# Patient Record
Sex: MALE | Race: WHITE | NOT HISPANIC OR LATINO | Employment: FULL TIME | URBAN - METROPOLITAN AREA
[De-identification: names, ages, dates, MRNs, and addresses within clinical notes are randomized per-mention and may not be internally consistent; named-entity substitution may affect disease eponyms.]

---

## 2022-06-02 DIAGNOSIS — M75.112 INCOMPLETE ROTATOR CUFF TEAR OR RUPTURE OF LEFT SHOULDER, NOT SPECIFIED AS TRAUMATIC: Primary | ICD-10-CM

## 2022-06-10 ENCOUNTER — HOSPITAL ENCOUNTER (OUTPATIENT)
Dept: RADIOLOGY | Facility: HOSPITAL | Age: 67
Discharge: HOME | End: 2022-06-10
Attending: ORTHOPAEDIC SURGERY
Payer: COMMERCIAL

## 2022-06-10 DIAGNOSIS — M75.112 INCOMPLETE ROTATOR CUFF TEAR OR RUPTURE OF LEFT SHOULDER, NOT SPECIFIED AS TRAUMATIC: ICD-10-CM

## 2022-06-24 ENCOUNTER — HOSPITAL ENCOUNTER (OUTPATIENT)
Dept: RADIOLOGY | Facility: HOSPITAL | Age: 67
Discharge: HOME | End: 2022-06-24
Attending: ORTHOPAEDIC SURGERY
Payer: MEDICARE

## 2022-06-24 PROCEDURE — 73221 MRI JOINT UPR EXTREM W/O DYE: CPT | Mod: TC,LT

## 2022-06-24 PROCEDURE — 73221 MRI JOINT UPR EXTREM W/O DYE: CPT | Mod: 26,LT,, | Performed by: RADIOLOGY

## 2022-06-24 PROCEDURE — 73221 MRI SHOULDER WITHOUT CONTRAST LEFT: ICD-10-PCS | Mod: 26,LT,, | Performed by: RADIOLOGY

## 2022-08-02 ENCOUNTER — PROCEDURE VISIT (OUTPATIENT)
Dept: NEUROLOGY | Facility: CLINIC | Age: 67
End: 2022-08-02
Payer: MEDICARE

## 2022-08-02 DIAGNOSIS — G56.03 BILATERAL CARPAL TUNNEL SYNDROME: Primary | ICD-10-CM

## 2022-08-02 PROCEDURE — 99999 PR STA SHADOW: CPT | Mod: 26,PBBFAC,, | Performed by: PSYCHIATRY & NEUROLOGY

## 2022-08-02 PROCEDURE — 95886 MUSC TEST DONE W/N TEST COMP: CPT | Mod: 26,S$PBB | Performed by: PSYCHIATRY & NEUROLOGY

## 2022-08-02 PROCEDURE — 99999 PR STA SHADOW: ICD-10-PCS | Mod: PBBFAC,,, | Performed by: PSYCHIATRY & NEUROLOGY

## 2022-08-02 PROCEDURE — 95911 NRV CNDJ TEST 9-10 STUDIES: CPT | Mod: 26,S$PBB | Performed by: PSYCHIATRY & NEUROLOGY

## 2022-08-02 NOTE — PROCEDURES
EMG W/ ULTRASOUND AND NERVE CONDUCTION TEST 2 Extremities    Date/Time: 8/2/2022 4:00 PM  Performed by: Irvin Ward MD  Authorized by: Irvin Ward MD       REPORT OF EMG and NERVE CONDUCTION STUDY    Name:Sal Rowley  Date of Study:  8/2/2022  Referring Physician:  Dr. Esquivel  Test Performed by:  MD Sylvia  Full Values Attached  Informed Consent Scanned.   No anesthesia used.   Amount of Blood Loss: none. The patient tolerated this procedure well.       Informed consent was obtained prior to performing this study. Two patient identifiers were confirmed with the patient prior to performing this study. A time out to determine correct patient and and agreement on procedure performed was conducted prior to the concentric needle examination.    Reason for the study:  Numb hands.       Findings:   Nerve conduction studies of the bilateral median (motor and sensory)nerves, bilateral ulnar (motor and sensory) nerves, bilateral radial sensory nerves were performed.  Median motor distal latency was prolonged to 6.8ms on the right and 4.7ms on the left. Median palm to wrist latency was prolonged to 5.4ms on the right and 3.4ms on the left. Otherwise, amplitudes, distal latencies, conduction velocities, and F-waves were normal.   EMG of selected muscles of the bilateral arms were performed as indicated on the attached sheets. Insertional activity was normal without fasciculation or fibrillation, normal sized and phasia of motor units.      Impression:  Abnormal Study secondary to the Presence of:    Bilateral Carpal Tunnel Syndrome which is moderately severe on the right and mild on the left    Irvin Ward M.D. [facility].     A copy of this EMG/NCS report will be sent to Dr Esquivel . Thanks for sending this patient for EMG/NCS. The patient plans to await further recommendations from you regarding the above findings.

## 2022-12-22 DIAGNOSIS — R05.3 CHRONIC COUGH: Primary | ICD-10-CM

## 2023-01-09 ENCOUNTER — HOSPITAL ENCOUNTER (OUTPATIENT)
Dept: PULMONOLOGY | Facility: HOSPITAL | Age: 68
Discharge: HOME | End: 2023-01-09
Attending: NURSE PRACTITIONER
Payer: COMMERCIAL

## 2023-01-09 DIAGNOSIS — R05.3 CHRONIC COUGH: ICD-10-CM

## 2023-01-09 PROCEDURE — 94729 DIFFUSING CAPACITY: CPT | Mod: 26,,, | Performed by: INTERNAL MEDICINE

## 2023-01-09 PROCEDURE — 94727 GAS DIL/WSHOT DETER LNG VOL: CPT

## 2023-01-09 PROCEDURE — 94799 UNLISTED PULMONARY SVC/PX: CPT | Mod: 26,,, | Performed by: INTERNAL MEDICINE

## 2023-01-09 PROCEDURE — 99900035 HC TECH TIME PER 15 MIN (STAT)

## 2023-01-09 PROCEDURE — 94729 PR C02/MEMBANE DIFFUSE CAPACITY: ICD-10-PCS | Mod: 26,,, | Performed by: INTERNAL MEDICINE

## 2023-01-09 PROCEDURE — 94060 EVALUATION OF WHEEZING: CPT

## 2023-01-09 PROCEDURE — 94729 DIFFUSING CAPACITY: CPT

## 2023-01-09 PROCEDURE — 99900031 HC PATIENT EDUCATION (STAT)

## 2023-01-09 PROCEDURE — 94060 PR EVAL OF BRONCHOSPASM: ICD-10-PCS | Mod: 26,,, | Performed by: INTERNAL MEDICINE

## 2023-01-09 PROCEDURE — 94727 GAS DIL/WSHOT DETER LNG VOL: CPT | Mod: 26,,, | Performed by: INTERNAL MEDICINE

## 2023-01-09 PROCEDURE — 94799 PR NIF/PIF PULMONARY FUNCTION TEST: ICD-10-PCS | Mod: 26,,, | Performed by: INTERNAL MEDICINE

## 2023-01-09 PROCEDURE — 94060 EVALUATION OF WHEEZING: CPT | Mod: 26,,, | Performed by: INTERNAL MEDICINE

## 2023-01-09 PROCEDURE — 94727 PR PULM FUNCTION TEST BY GAS: ICD-10-PCS | Mod: 26,,, | Performed by: INTERNAL MEDICINE

## 2023-01-11 PROBLEM — R05.3 CHRONIC COUGH: Status: ACTIVE | Noted: 2023-01-11

## 2023-01-11 LAB
BRPFT: ABNORMAL
DLCO SINGLE BREATH LLN: 16.63
DLCO SINGLE BREATH PRE REF: 68.2 %
DLCO SINGLE BREATH REF: 23.56
DLCOC SBVA LLN: 2.51
DLCOC SBVA REF: 3.86
DLCOC SINGLE BREATH LLN: 16.63
DLCOC SINGLE BREATH REF: 23.56
DLCOVA LLN: 2.51
DLCOVA PRE REF: 108.7 %
DLCOVA PRE: 4.19 ML/(MIN*MMHG*L) (ref 2.51–5.2)
DLCOVA REF: 3.86
ERVN2 LLN: -16449.03
ERVN2 POST REF: 55.9 %
ERVN2 POST: 0.54 L (ref -16449.03–16450.97)
ERVN2 REF: 0.97
FEF 25 75 CHG: 5 %
FEF 25 75 LLN: 1.01
FEF 25 75 POST REF: 151.4 %
FEF 25 75 PRE REF: 144.1 %
FEF 25 75 REF: 2.26
FET100 CHG: -18.1 %
FEV1 CHG: -2.1 %
FEV1 FVC CHG: 2.3 %
FEV1 FVC LLN: 64
FEV1 FVC POST REF: 111.7 %
FEV1 FVC PRE REF: 109.1 %
FEV1 FVC REF: 77
FEV1 LLN: 2.05
FEV1 POST REF: 86 %
FEV1 PRE REF: 87.9 %
FEV1 REF: 2.8
FRCN2 LLN: 2.39
FRCN2 POST REF: 57.4 %
FRCN2 POST: 1.94 L (ref 2.39–4.36)
FRCN2 REF: 3.38
FVC CHG: -4.3 %
FVC LLN: 2.71
FVC POST REF: 77 %
FVC PRE REF: 80.5 %
FVC REF: 3.63
IVC PRE: 2.18 L (ref 2.71–4.56)
IVC SINGLE BREATH LLN: 2.71
IVC SINGLE BREATH PRE REF: 60.1 %
IVC SINGLE BREATH REF: 3.63
MEP LLN: 83
MEP PRE REF: 93 %
MEP PRE: 93.03 CMH2O (ref 83.23–116.78)
MEP REF: 100
MIP LLN: 58
MIP PRE REF: 129.1 %
MIP PRE: 96.83 CMH2O (ref 58.23–91.78)
MIP REF: 75
MVV LLN: 91
MVV PRE REF: 87 %
MVV REF: 107
PEF CHG: 22 %
PEF LLN: 5.56
PEF POST REF: 95 %
PEF PRE REF: 77.9 %
PEF REF: 7.56
POST FEF 25 75: 3.42 L/S (ref 1.01–4)
POST FET 100: 6 SEC
POST FEV1 FVC: 86.23 % (ref 64.03–88.87)
POST FEV1: 2.41 L (ref 2.05–3.51)
POST FVC: 2.79 L (ref 2.71–4.56)
POST PEF: 7.18 L/S (ref 5.56–9.56)
PRE DLCO: 16.08 ML/(MIN*MMHG) (ref 16.63–30.49)
PRE FEF 25 75: 3.26 L/S (ref 1.01–4)
PRE FET 100: 7.33 SEC
PRE FEV1 FVC: 84.27 % (ref 64.03–88.87)
PRE FEV1: 2.46 L (ref 2.05–3.51)
PRE FVC: 2.92 L (ref 2.71–4.56)
PRE MVV: 92.76 L/MIN (ref 90.65–122.65)
PRE PEF: 5.89 L/S (ref 5.56–9.56)
RVN2 LLN: 1.73
RVN2 POST REF: 58 %
RVN2 POST: 1.4 L (ref 1.73–3.08)
RVN2 REF: 2.41
RVN2TLCN2 LLN: 31.11
RVN2TLCN2 POST REF: 83.1 %
RVN2TLCN2 POST: 33.31 % (ref 31.11–49.07)
RVN2TLCN2 REF: 40.09
TLCN2 LLN: 4.96
TLCN2 POST REF: 68.6 %
TLCN2 POST: 4.19 L (ref 4.96–7.26)
TLCN2 REF: 6.11
VA PRE: 3.84 L (ref 5.96–5.96)
VA SINGLE BREATH LLN: 5.96
VA SINGLE BREATH PRE REF: 64.4 %
VA SINGLE BREATH REF: 5.96
VCMAXN2 LLN: 2.71
VCMAXN2 POST REF: 77 %
VCMAXN2 POST: 2.79 L (ref 2.71–4.56)
VCMAXN2 REF: 3.63

## 2023-03-27 PROBLEM — G56.02 LEFT CARPAL TUNNEL SYNDROME: Status: ACTIVE | Noted: 2023-03-27

## 2023-04-10 PROBLEM — G56.01 CARPAL TUNNEL SYNDROME ON RIGHT: Status: ACTIVE | Noted: 2023-04-10

## 2023-06-05 ENCOUNTER — APPOINTMENT (OUTPATIENT)
Dept: RADIOLOGY | Facility: CLINIC | Age: 68
End: 2023-06-05
Attending: FAMILY MEDICINE
Payer: COMMERCIAL

## 2023-06-05 ENCOUNTER — OFFICE VISIT (OUTPATIENT)
Dept: FAMILY MEDICINE | Facility: CLINIC | Age: 68
End: 2023-06-05
Payer: MEDICARE

## 2023-06-05 VITALS
BODY MASS INDEX: 36.72 KG/M2 | WEIGHT: 220.38 LBS | SYSTOLIC BLOOD PRESSURE: 136 MMHG | HEIGHT: 65 IN | RESPIRATION RATE: 16 BRPM | OXYGEN SATURATION: 98 % | HEART RATE: 82 BPM | DIASTOLIC BLOOD PRESSURE: 82 MMHG

## 2023-06-05 DIAGNOSIS — R20.0 NUMBNESS OF RIGHT ANTERIOR THIGH: ICD-10-CM

## 2023-06-05 DIAGNOSIS — R05.3 CHRONIC COUGH: ICD-10-CM

## 2023-06-05 DIAGNOSIS — I10 ESSENTIAL HYPERTENSION: Primary | ICD-10-CM

## 2023-06-05 DIAGNOSIS — Z12.11 COLON CANCER SCREENING: ICD-10-CM

## 2023-06-05 DIAGNOSIS — M43.16 SPONDYLOLISTHESIS OF LUMBAR REGION: ICD-10-CM

## 2023-06-05 DIAGNOSIS — M48.062 SPINAL STENOSIS OF LUMBAR REGION WITH NEUROGENIC CLAUDICATION: ICD-10-CM

## 2023-06-05 DIAGNOSIS — M67.912 DISORDER OF ROTATOR CUFF OF BOTH SHOULDERS: ICD-10-CM

## 2023-06-05 DIAGNOSIS — E78.5 DYSLIPIDEMIA: ICD-10-CM

## 2023-06-05 DIAGNOSIS — M67.911 DISORDER OF ROTATOR CUFF OF BOTH SHOULDERS: ICD-10-CM

## 2023-06-05 PROCEDURE — 4010F PR ACE/ARB THEARPY RXD/TAKEN: ICD-10-PCS | Mod: CPTII,S$GLB,, | Performed by: FAMILY MEDICINE

## 2023-06-05 PROCEDURE — 72100 X-RAY EXAM L-S SPINE 2/3 VWS: CPT | Mod: 26,,, | Performed by: RADIOLOGY

## 2023-06-05 PROCEDURE — 3008F PR BODY MASS INDEX (BMI) DOCUMENTED: ICD-10-PCS | Mod: CPTII,S$GLB,, | Performed by: FAMILY MEDICINE

## 2023-06-05 PROCEDURE — 1159F PR MEDICATION LIST DOCUMENTED IN MEDICAL RECORD: ICD-10-PCS | Mod: CPTII,S$GLB,, | Performed by: FAMILY MEDICINE

## 2023-06-05 PROCEDURE — 99204 OFFICE O/P NEW MOD 45 MIN: CPT | Mod: S$GLB,,, | Performed by: FAMILY MEDICINE

## 2023-06-05 PROCEDURE — 1126F AMNT PAIN NOTED NONE PRSNT: CPT | Mod: CPTII,S$GLB,, | Performed by: FAMILY MEDICINE

## 2023-06-05 PROCEDURE — 4010F ACE/ARB THERAPY RXD/TAKEN: CPT | Mod: CPTII,S$GLB,, | Performed by: FAMILY MEDICINE

## 2023-06-05 PROCEDURE — 3008F BODY MASS INDEX DOCD: CPT | Mod: CPTII,S$GLB,, | Performed by: FAMILY MEDICINE

## 2023-06-05 PROCEDURE — 1126F PR PAIN SEVERITY QUANTIFIED, NO PAIN PRESENT: ICD-10-PCS | Mod: CPTII,S$GLB,, | Performed by: FAMILY MEDICINE

## 2023-06-05 PROCEDURE — 93010 ELECTROCARDIOGRAM REPORT: CPT | Mod: S$GLB,,, | Performed by: INTERNAL MEDICINE

## 2023-06-05 PROCEDURE — 93010 EKG 12-LEAD: ICD-10-PCS | Mod: S$GLB,,, | Performed by: INTERNAL MEDICINE

## 2023-06-05 PROCEDURE — 99999 PR PBB SHADOW E&M-EST. PATIENT-LVL V: ICD-10-PCS | Mod: PBBFAC,,, | Performed by: FAMILY MEDICINE

## 2023-06-05 PROCEDURE — 1101F PT FALLS ASSESS-DOCD LE1/YR: CPT | Mod: CPTII,S$GLB,, | Performed by: FAMILY MEDICINE

## 2023-06-05 PROCEDURE — 1101F PR PT FALLS ASSESS DOC 0-1 FALLS W/OUT INJ PAST YR: ICD-10-PCS | Mod: CPTII,S$GLB,, | Performed by: FAMILY MEDICINE

## 2023-06-05 PROCEDURE — 99999 PR PBB SHADOW E&M-EST. PATIENT-LVL V: CPT | Mod: PBBFAC,,, | Performed by: FAMILY MEDICINE

## 2023-06-05 PROCEDURE — 93005 EKG 12-LEAD: ICD-10-PCS | Mod: S$GLB,,, | Performed by: FAMILY MEDICINE

## 2023-06-05 PROCEDURE — 3288F PR FALLS RISK ASSESSMENT DOCUMENTED: ICD-10-PCS | Mod: CPTII,S$GLB,, | Performed by: FAMILY MEDICINE

## 2023-06-05 PROCEDURE — 93005 ELECTROCARDIOGRAM TRACING: CPT | Mod: S$GLB,,, | Performed by: FAMILY MEDICINE

## 2023-06-05 PROCEDURE — 3075F SYST BP GE 130 - 139MM HG: CPT | Mod: CPTII,S$GLB,, | Performed by: FAMILY MEDICINE

## 2023-06-05 PROCEDURE — 3288F FALL RISK ASSESSMENT DOCD: CPT | Mod: CPTII,S$GLB,, | Performed by: FAMILY MEDICINE

## 2023-06-05 PROCEDURE — 3079F DIAST BP 80-89 MM HG: CPT | Mod: CPTII,S$GLB,, | Performed by: FAMILY MEDICINE

## 2023-06-05 PROCEDURE — 1159F MED LIST DOCD IN RCRD: CPT | Mod: CPTII,S$GLB,, | Performed by: FAMILY MEDICINE

## 2023-06-05 PROCEDURE — 3075F PR MOST RECENT SYSTOLIC BLOOD PRESS GE 130-139MM HG: ICD-10-PCS | Mod: CPTII,S$GLB,, | Performed by: FAMILY MEDICINE

## 2023-06-05 PROCEDURE — 3079F PR MOST RECENT DIASTOLIC BLOOD PRESSURE 80-89 MM HG: ICD-10-PCS | Mod: CPTII,S$GLB,, | Performed by: FAMILY MEDICINE

## 2023-06-05 PROCEDURE — 72100 XR LUMBAR SPINE AP AND LATERAL: ICD-10-PCS | Mod: 26,,, | Performed by: RADIOLOGY

## 2023-06-05 PROCEDURE — 72100 X-RAY EXAM L-S SPINE 2/3 VWS: CPT | Mod: TC,PO

## 2023-06-05 PROCEDURE — 99204 PR OFFICE/OUTPT VISIT, NEW, LEVL IV, 45-59 MIN: ICD-10-PCS | Mod: S$GLB,,, | Performed by: FAMILY MEDICINE

## 2023-06-05 RX ORDER — LOSARTAN POTASSIUM 50 MG/1
1 TABLET ORAL
COMMUNITY
Start: 2023-01-23 | End: 2023-06-05

## 2023-06-05 RX ORDER — CELECOXIB 200 MG/1
200 CAPSULE ORAL 2 TIMES DAILY PRN
COMMUNITY
Start: 2023-05-26 | End: 2023-09-29 | Stop reason: SDUPTHER

## 2023-06-05 RX ORDER — PANTOPRAZOLE SODIUM 40 MG/1
40 TABLET, DELAYED RELEASE ORAL DAILY
Qty: 30 TABLET | Refills: 5 | Status: SHIPPED | OUTPATIENT
Start: 2023-06-05 | End: 2023-07-05

## 2023-06-05 RX ORDER — LOSARTAN POTASSIUM 100 MG/1
100 TABLET ORAL DAILY
Qty: 30 TABLET | Refills: 5 | Status: SHIPPED | OUTPATIENT
Start: 2023-06-05 | End: 2023-09-29

## 2023-06-05 RX ORDER — FLUTICASONE FUROATE, UMECLIDINIUM BROMIDE AND VILANTEROL TRIFENATATE 100; 62.5; 25 UG/1; UG/1; UG/1
1 POWDER RESPIRATORY (INHALATION)
COMMUNITY
Start: 2023-05-27 | End: 2023-09-29

## 2023-06-05 RX ORDER — PANTOPRAZOLE SODIUM 40 MG/1
40 TABLET, DELAYED RELEASE ORAL DAILY
Qty: 30 TABLET | Refills: 5 | Status: SHIPPED | OUTPATIENT
Start: 2023-06-05 | End: 2024-03-28 | Stop reason: SDUPTHER

## 2023-06-05 NOTE — PROGRESS NOTES
Subjective:       Patient ID: Sal Rowley is a 68 y.o. male.    Chief Complaint: Establish Care (Pt states he has a history of shoulder and hand pain)    Pt is a 68 y.o. male who presents for evaluation and management of   Encounter Diagnoses   Name Primary?    Essential hypertension Yes    Dyslipidemia     Chronic cough     Disorder of rotator cuff of both shoulders     Numbness of right anterior thigh     Colon cancer screening     Spinal stenosis of lumbar region with neurogenic claudication     Spondylolisthesis of lumbar region    .  Doing well on current meds. Denies any side effects. Prevention is up to date.  Review of Systems   Constitutional:  Negative for fever.   Respiratory:  Negative for shortness of breath.    Cardiovascular:  Negative for chest pain.   Gastrointestinal:  Negative for anal bleeding and blood in stool.   Genitourinary:  Negative for dysuria.   Musculoskeletal:  Positive for arthralgias. Negative for back pain.   Neurological:  Positive for numbness. Negative for dizziness and light-headedness.        Numbness right thigh worse with standing        Objective:      Physical Exam  Constitutional:       Appearance: Normal appearance. He is well-developed. He is obese. He is not ill-appearing.   HENT:      Head: Normocephalic and atraumatic.      Right Ear: External ear normal.      Left Ear: External ear normal.      Nose: Nose normal.      Mouth/Throat:      Mouth: Mucous membranes are moist.      Pharynx: No oropharyngeal exudate or posterior oropharyngeal erythema.   Eyes:      General: No scleral icterus.        Right eye: No discharge.         Left eye: No discharge.      Conjunctiva/sclera: Conjunctivae normal.      Pupils: Pupils are equal, round, and reactive to light.   Neck:      Thyroid: No thyromegaly.      Vascular: No JVD.      Trachea: No tracheal deviation.   Cardiovascular:      Rate and Rhythm: Normal rate and regular rhythm.      Heart sounds: Normal heart  sounds. No murmur heard.  Pulmonary:      Effort: Pulmonary effort is normal. No respiratory distress.      Breath sounds: Normal breath sounds. No wheezing or rales.   Chest:      Chest wall: No tenderness.   Abdominal:      General: Bowel sounds are normal. There is no distension.      Palpations: Abdomen is soft. There is no mass.      Tenderness: There is no abdominal tenderness. There is no guarding or rebound.   Musculoskeletal:         General: Normal range of motion.      Cervical back: Normal range of motion and neck supple.      Right lower leg: No edema.      Left lower leg: No edema.   Lymphadenopathy:      Cervical: No cervical adenopathy.   Skin:     General: Skin is warm and dry.   Neurological:      Mental Status: He is alert and oriented to person, place, and time.      Cranial Nerves: No cranial nerve deficit.      Motor: No abnormal muscle tone.      Coordination: Coordination normal.      Deep Tendon Reflexes: Reflexes are normal and symmetric. Reflexes normal.   Psychiatric:         Behavior: Behavior normal.         Thought Content: Thought content normal.         Judgment: Judgment normal.       Assessment:       1. Essential hypertension    2. Dyslipidemia    3. Chronic cough    4. Disorder of rotator cuff of both shoulders    5. Numbness of right anterior thigh    6. Colon cancer screening    7. Spinal stenosis of lumbar region with neurogenic claudication    8. Spondylolisthesis of lumbar region        Plan:   1. Essential hypertension  -     losartan (COZAAR) 100 MG tablet; Take 1 tablet (100 mg total) by mouth once daily.  Dispense: 30 tablet; Refill: 5  -     EKG 12-lead    2. Dyslipidemia    3. Chronic cough  Overview:  Remote h/o smoking. Quit almost 30 years ago   His previous PCP put him on Trellegy which has helped some with the cough. He called him COPD, per pt.   His PFT's from 1/2023 show no obstructive pattern. He has moderate restriction.   Trial of PPI 6/2023. He has  pulmonology consult pending....     Orders:  -     X-Ray Chest PA And Lateral; Future; Expected date: 06/05/2023  -     pantoprazole (PROTONIX) 40 MG tablet; Take 1 tablet (40 mg total) by mouth once daily.  Dispense: 30 tablet; Refill: 5  -     pantoprazole (PROTONIX) 40 MG tablet; Take 1 tablet (40 mg total) by mouth once daily.  Dispense: 30 tablet; Refill: 5    4. Disorder of rotator cuff of both shoulders  Overview:  Right repaired [facility]. Waiting to have left done...     Orders:  -     Ambulatory referral/consult to Orthopedics; Future; Expected date: 06/12/2023    5. Numbness of right anterior thigh  -     X-Ray Lumbar Spine AP And Lateral; Future; Expected date: 06/05/2023    6. Colon cancer screening  -     Case Request Endoscopy: COLONOSCOPY    7. Spinal stenosis of lumbar region with neurogenic claudication    8. Spondylolisthesis of lumbar region  Overview:  Has some right thigh pain with standing, but no meds needed   He tolerates it for now         Will decide f/u after I get labs from his previous PCP   No follow-ups on file.

## 2023-06-07 DIAGNOSIS — Z12.11 COLON CANCER SCREENING: ICD-10-CM

## 2023-06-07 DIAGNOSIS — I10 ESSENTIAL HYPERTENSION: ICD-10-CM

## 2023-07-12 ENCOUNTER — HOSPITAL ENCOUNTER (OUTPATIENT)
Dept: PREADMISSION TESTING | Facility: HOSPITAL | Age: 68
Discharge: HOME | End: 2023-07-12
Attending: FAMILY MEDICINE
Payer: COMMERCIAL

## 2023-07-12 ENCOUNTER — HOSPITAL ENCOUNTER (OUTPATIENT)
Dept: PULMONOLOGY | Facility: HOSPITAL | Age: 68
Discharge: HOME | End: 2023-07-12
Attending: FAMILY MEDICINE
Payer: COMMERCIAL

## 2023-07-12 DIAGNOSIS — Z01.818 PREOP TESTING: ICD-10-CM

## 2023-07-12 PROCEDURE — 93010 EKG 12-LEAD: ICD-10-PCS | Mod: ,,, | Performed by: INTERNAL MEDICINE

## 2023-07-12 PROCEDURE — 93010 ELECTROCARDIOGRAM REPORT: CPT | Mod: ,,, | Performed by: INTERNAL MEDICINE

## 2023-07-12 PROCEDURE — 99900035 HC TECH TIME PER 15 MIN (STAT)

## 2023-07-12 PROCEDURE — 93005 ELECTROCARDIOGRAM TRACING: CPT

## 2023-07-25 ENCOUNTER — HOSPITAL ENCOUNTER (OUTPATIENT)
Facility: HOSPITAL | Age: 68
Discharge: HOME | End: 2023-07-25
Attending: FAMILY MEDICINE | Admitting: FAMILY MEDICINE
Payer: COMMERCIAL

## 2023-07-25 ENCOUNTER — ANESTHESIA (OUTPATIENT)
Dept: ENDOSCOPY | Facility: HOSPITAL | Age: 68
End: 2023-07-25
Payer: COMMERCIAL

## 2023-07-25 ENCOUNTER — ANESTHESIA EVENT (OUTPATIENT)
Dept: ENDOSCOPY | Facility: HOSPITAL | Age: 68
End: 2023-07-25
Payer: COMMERCIAL

## 2023-07-25 VITALS
OXYGEN SATURATION: 98 % | TEMPERATURE: 97 F | DIASTOLIC BLOOD PRESSURE: 74 MMHG | HEART RATE: 64 BPM | SYSTOLIC BLOOD PRESSURE: 124 MMHG | RESPIRATION RATE: 17 BRPM

## 2023-07-25 DIAGNOSIS — K63.5 POLYP OF DESCENDING COLON, UNSPECIFIED TYPE: Primary | ICD-10-CM

## 2023-07-25 DIAGNOSIS — Z12.11 SCREEN FOR COLON CANCER: ICD-10-CM

## 2023-07-25 PROCEDURE — 88305 TISSUE EXAM BY PATHOLOGIST: ICD-10-PCS | Mod: 26,,, | Performed by: PATHOLOGY

## 2023-07-25 PROCEDURE — 00811 ANES LWR INTST NDSC NOS: CPT | Mod: QZ,P2,33 | Performed by: NURSE ANESTHETIST, CERTIFIED REGISTERED

## 2023-07-25 PROCEDURE — D9220AH HC ANESTHESIA PROFESSIONAL FEE: Mod: QZ,P2,33 | Performed by: NURSE ANESTHETIST, CERTIFIED REGISTERED

## 2023-07-25 PROCEDURE — 63600175 PHARM REV CODE 636 W HCPCS: Performed by: NURSE ANESTHETIST, CERTIFIED REGISTERED

## 2023-07-25 PROCEDURE — 45384 COLONOSCOPY W/LESION REMOVAL: CPT | Mod: 33,,, | Performed by: FAMILY MEDICINE

## 2023-07-25 PROCEDURE — 27201012 HC FORCEPS, HOT/COLD, DISP: Performed by: FAMILY MEDICINE

## 2023-07-25 PROCEDURE — 45384 PR COLONOSCOPY,REMV LESN,FORCEP/CAUTERY: ICD-10-PCS | Mod: 33,,, | Performed by: FAMILY MEDICINE

## 2023-07-25 PROCEDURE — 88305 TISSUE EXAM BY PATHOLOGIST: CPT | Mod: 26,,, | Performed by: PATHOLOGY

## 2023-07-25 PROCEDURE — 88305 TISSUE EXAM BY PATHOLOGIST: CPT | Performed by: PATHOLOGY

## 2023-07-25 PROCEDURE — 37000008 HC ANESTHESIA 1ST 15 MINUTES: Performed by: FAMILY MEDICINE

## 2023-07-25 PROCEDURE — 37000009 HC ANESTHESIA EA ADD 15 MINS: Performed by: FAMILY MEDICINE

## 2023-07-25 PROCEDURE — 45384 COLONOSCOPY W/LESION REMOVAL: CPT | Mod: PT | Performed by: FAMILY MEDICINE

## 2023-07-25 PROCEDURE — 25000003 PHARM REV CODE 250: Performed by: NURSE ANESTHETIST, CERTIFIED REGISTERED

## 2023-07-25 RX ORDER — LIDOCAINE HYDROCHLORIDE 20 MG/ML
INJECTION, SOLUTION EPIDURAL; INFILTRATION; INTRACAUDAL; PERINEURAL
Status: DISCONTINUED | OUTPATIENT
Start: 2023-07-25 | End: 2023-07-25

## 2023-07-25 RX ORDER — PROPOFOL 10 MG/ML
VIAL (ML) INTRAVENOUS
Status: DISCONTINUED | OUTPATIENT
Start: 2023-07-25 | End: 2023-07-25

## 2023-07-25 RX ORDER — ATORVASTATIN CALCIUM 20 MG/1
20 TABLET, FILM COATED ORAL NIGHTLY
Qty: 90 TABLET | Refills: 1 | Status: SHIPPED | OUTPATIENT
Start: 2023-07-25 | End: 2024-01-16

## 2023-07-25 RX ADMIN — PROPOFOL 25 MG: 10 INJECTION, EMULSION INTRAVENOUS at 09:07

## 2023-07-25 RX ADMIN — PROPOFOL 50 MG: 10 INJECTION, EMULSION INTRAVENOUS at 09:07

## 2023-07-25 RX ADMIN — PROPOFOL 125 MG: 10 INJECTION, EMULSION INTRAVENOUS at 09:07

## 2023-07-25 RX ADMIN — LIDOCAINE HYDROCHLORIDE 100 MG: 20 INJECTION, SOLUTION EPIDURAL; INFILTRATION; INTRACAUDAL; PERINEURAL at 09:07

## 2023-07-25 RX ADMIN — SODIUM CHLORIDE, SODIUM LACTATE, POTASSIUM CHLORIDE, AND CALCIUM CHLORIDE: .6; .31; .03; .02 INJECTION, SOLUTION INTRAVENOUS at 08:07

## 2023-07-25 NOTE — TELEPHONE ENCOUNTER
LOV 06/05/2023    patient requesting refill for atorvastatin (LIPITOR) 20 MG tablet        RX pending   pharmacy confirmed  please advise

## 2023-07-25 NOTE — TELEPHONE ENCOUNTER
Care Due:                  Date            Visit Type   Department     Provider  --------------------------------------------------------------------------------                                NP -                              [facility]  Last Visit: 06-      CARE ([facility])   NICHELLE Mccarty                              EP -                              [facility]  Next Visit: 12-      CARE ([facility])   NICHELLE Mccarty                                                            Last  Test          Frequency    Reason                     Performed    Due Date  --------------------------------------------------------------------------------    CMP.........  12 months..  losartan.................  Not Found    Overdue    [employer] Embedded Care Due Messages. Reference number: [id number].   7/25/2023 10:38:33 AM CDT

## 2023-07-25 NOTE — ANESTHESIA PREPROCEDURE EVALUATION
07/25/2023  Pre-operative evaluation for Procedure(s) (LRB):  RELEASE, CARPAL TUNNEL (Right)    Sal Rowley is a 68 y.o. male     Patient Active Problem List   Diagnosis    Chronic cough    Left carpal tunnel syndrome    Carpal tunnel syndrome on right    Disorder of rotator cuff of both shoulders    Spinal stenosis of lumbar region with neurogenic claudication    Spondylolisthesis of lumbar region       Review of patient's allergies indicates:  No Known Allergies    Current Outpatient Medications on File Prior to Visit   Medication Sig Dispense Refill    atorvastatin (LIPITOR) 20 MG tablet Take 20 mg by mouth every evening.      celecoxib (CELEBREX) 200 MG capsule Take 200 mg by mouth 2 (two) times daily as needed.      ergocalciferol (ERGOCALCIFEROL) 50,000 unit Cap Take 50,000 Units by mouth every 7 days.      HYDROcodone-acetaminophen (NORCO) 5-325 mg per tablet Take 1 tablet by mouth every 6 (six) hours as needed for Pain.      losartan (COZAAR) 100 MG tablet Take 1 tablet (100 mg total) by mouth once daily. 30 tablet 5    losartan (COZAAR) 50 MG tablet Take 50 mg by mouth once daily.      niacin 500 MG CpSR as needed.  5    pantoprazole (PROTONIX) 40 MG tablet Take 1 tablet (40 mg total) by mouth once daily. 30 tablet 5    tiotropium (SPIRIVA) 18 mcg inhalation capsule Inhale 18 mcg into the lungs once daily. Controller      traMADoL (ULTRAM) 50 mg tablet Take 50 mg by mouth every 6 (six) hours as needed for Pain.      TRELEGY ELLIPTA 100-62.5-25 mcg DsDv Inhale 1 puff into the lungs.       Current Facility-Administered Medications on File Prior to Visit   Medication Dose Route Frequency Provider Last Rate Last Admin    fentaNYL 50 mcg/mL injection 25 mcg  25 mcg Intravenous Q5 Min PRN Mendez Patel MD        mupirocin 2 % ointment   Nasal On Call Procedure Brooks Vivas  MD        ondansetron injection 4 mg  4 mg Intravenous Once PRN Mendez Patel MD        sodium chloride 0.9% flush 3 mL  3 mL Intravenous PRN Mendez Patel MD           Past Surgical History:   Procedure Laterality Date    CARPAL TUNNEL RELEASE Left 3/27/2023    Procedure: RELEASE, CARPAL TUNNEL;  Surgeon: Brooks Vivas MD;  Location: [facility] OR;  Service: Orthopedics;  Laterality: Left;    CARPAL TUNNEL RELEASE Right 4/10/2023    Procedure: RELEASE, CARPAL TUNNEL;  Surgeon: Brooks Vivas MD;  Location: [facility] OR;  Service: Orthopedics;  Laterality: Right;    KNEE ARTHROSCOPY      x 3    ROTATOR CUFF REPAIR Right     TONSILLECTOMY         Social History     Socioeconomic History    Marital status:    Tobacco Use    Smoking status: Former     Types: Cigarettes     Quit date:      Years since quittin.5   Substance and Sexual Activity    Alcohol use: Yes     Comment: 3 beers/day    Drug use: No         CBC: No results for input(s): WBC, RBC, HGB, HCT, PLT, MCV, MCH, MCHC in the last 72 hours.    CMP: No results for input(s): NA, K, CL, CO2, BUN, CREATININE, GLU, MG, PHOS, CALCIUM, ALBUMIN, PROT, ALKPHOS, ALT, AST, BILITOT in the last 72 hours.    INR  No results for input(s): PT, INR, PROTIME, APTT in the last 72 hours.        Diagnostic Studies:      EKD Echo:  No results found for this or any previous visit.        Pre-op Assessment    I have reviewed the Patient Summary Reports.     I have reviewed the Nursing Notes.    I have reviewed the Medications.     Review of Systems  Anesthesia Hx:  History of prior surgery of interest to airway management or planning:   Social:  Former Smoker, Social Alcohol Use    Hematology/Oncology:  Hematology Normal   Oncology Normal     EENT/Dental:EENT/Dental Normal   Cardiovascular:   Exercise tolerance: good Hypertension, well controlled    Pulmonary:  Pulmonary Normal    Renal/:  Renal/ Normal     Hepatic/GI:  Hepatic/GI Normal     Musculoskeletal:   Arthritis     Neurological:  Neurology Normal    Endocrine:  Endocrine Normal  Obesity / BMI > 30  Dermatological:  Skin Normal    Psych:  Psychiatric Normal                Anesthesia Plan  Type of Anesthesia, risks & benefits discussed:    Anesthesia Type: Gen Natural Airway  Intra-op Monitoring Plan: Standard ASA Monitors  Post Op Pain Control Plan: multimodal analgesia  Induction:  IV  Informed Consent: Informed consent signed with the Patient and all parties understand the risks and agree with anesthesia plan.  All questions answered.   ASA Score: 2  Day of Surgery Review of History & Physical: H&P Update referred to the surgeon/provider.I have interviewed and examined the patient. I have reviewed the patient's H&P dated: 7/25/2023. There are no significant changes.     Ready For Surgery From Anesthesia Perspective.     .

## 2023-07-25 NOTE — TRANSFER OF CARE
Anesthesia Transfer of Care Note    Patient: Sal Rowley    Procedure(s) Performed: Procedure(s) (LRB):  COLONOSCOPY (N/A)    Patient location: PACU    Anesthesia Type: general    Transport from OR: Transported from OR on room air with adequate spontaneous ventilation. Transported from OR on 2-3 L/min O2 by NC with adequate spontaneous ventilation    Post pain: adequate analgesia    Post assessment: no apparent anesthetic complications and tolerated procedure well    Post vital signs: stable    Level of consciousness: awake    Complications: none    Transfer of care protocol was followed      Last vitals:   Visit Vitals  BP (!) 146/86 (BP Location: Left arm, Patient Position: Lying)   Pulse (!) 57   Temp 36.3 °C (97.3 °F) (Skin)   Resp 18   SpO2 98%

## 2023-07-25 NOTE — ANESTHESIA POSTPROCEDURE EVALUATION
Anesthesia Post Evaluation    Patient: Sal Rowley    Procedure(s) Performed: Procedure(s) (LRB):  COLONOSCOPY (N/A)    Final Anesthesia Type: general      Patient location during evaluation: PACU  Patient participation: Yes- Able to Participate  Level of consciousness: awake and alert, oriented and awake  Post-procedure vital signs: reviewed and stable  Pain management: adequate  Airway patency: patent    PONV status at discharge: No PONV  Anesthetic complications: no      Cardiovascular status: hemodynamically stable and stable  Respiratory status: spontaneous ventilation  Hydration status: euvolemic  Follow-up not needed.          Vitals Value Taken Time   /57 07/25/23 0937   Temp 36 07/25/23 0937   Pulse 88 07/25/23 0937   Resp 18 07/25/23 0937   SpO2 98 07/25/23 0937         No case tracking events are documented in the log.      Pain/Leena Score: No data recorded      
Burn

## 2023-07-25 NOTE — DISCHARGE SUMMARY
[facility]  Discharge Note  Short Stay    Procedure(s) (LRB):  COLONOSCOPY (N/A)      OUTCOME: Patient tolerated treatment/procedure well without complication and is now ready for discharge.    DISPOSITION: Home or Self Care    FINAL DIAGNOSIS:  Polyp of descending colon    FOLLOWUP: In clinic    DISCHARGE INSTRUCTIONS:  No discharge procedures on file.     TIME SPENT ON DISCHARGE:     10 minutes

## 2023-07-25 NOTE — BRIEF OP NOTE
[facility]  Brief Operative Note    Surgery Date: 7/25/2023     Surgeon(s) and Role:     * Justice Mccarty MD - Primary    Assisting Surgeon: None    Pre-op Diagnosis:  Colon cancer screening [Z12.11]    Post-op Diagnosis:  Post-Op Diagnosis Codes:     * Colon polyp [K63.5]    Procedure(s) (LRB):  COLONOSCOPY (N/A)    Anesthesia: General    Operative Findings: descending colon polyp    Estimated Blood Loss: * No values recorded between 7/25/2023  8:59 AM and 7/25/2023  9:36 AM *         Specimens:   Specimen (24h ago, onward)       Start     Ordered    07/25/23 0922  Specimen to Pathology, Surgery Other  Once        Comments: Pre- Screening  Spec- Descending colon polyp  Post- Polyps  Proc- Colonoscopy   Dr JERROD Mccarty   References:    Click here for ordering Quick Tip   Question Answer Comment   Procedure Type: Other    Specimen Class: Routine/Screening        07/25/23 0935                      Discharge Note    OUTCOME: Patient tolerated treatment/procedure well without complication and is now ready for discharge.    DISPOSITION: Home or Self Care    FINAL DIAGNOSIS:  Polyp of descending colon    FOLLOWUP: In clinic    DISCHARGE INSTRUCTIONS:  No discharge procedures on file.

## 2023-07-25 NOTE — OP NOTE
[facility] Endoscopy  Colonoscopy Procedure  Operative Note    SUMMARY     Date of Procedure: 2023     Procedure: Procedure(s) (LRB):  COLONOSCOPY (N/A)    Surgeon(s) and Role:     * Justice Mccarty MD - Primary    Assisting Surgeon: None     Patient location: [facility] endoscopy suite    Pre-Operative Diagnosis: Colon cancer screening [Z12.11]    Post-Operative Diagnosis: Post-Op Diagnosis Codes:     * Colon polyp [K63.5]     Indications: screening     Medications:  Propofol per anesthesia.      ASA Score: II       Procedure:                  The patient was brought in to the endoscopy suite where the risks, benefits, and alternatives of the procedure were described.  The patient was given the opportunity to ask questions and then signed informed consent.  Patient was positioned in the left lateral decubitus position, continuous monitoring was initiated, and supplemental oxygen was provided via nasal cannula.  Adequate sedation was achieved with the above mentioned medications and then titrated during the entire procedure.  Digital rectal exam was performed.  Under direct visualization the colonoscope was introduced through the anus in to the rectum.  The scope was then advanced to the cecum, which was identified by the ileocecal valve.  Scope was then withdrawn and the mucosa was carefully examined in a circular fashion.  The entire colonic mucosa was examined, including the rectum with retroflexion.  Air was evacuated from the colon and the procedure was terminated.  The patient tolerated the procedure well and was able to be transferred to the recovery area in stable condition.    Findings:                 Digital rectal examination: 30g prostate no nodules                      Rectum: WNL                    Sigmoid: WNL        Descendin-6mm sessile polyp at about 60cm. Collected with 13mm hot bx and retrieved for path          Transverse: WNL         Ascending: WNL        Cecum: WNL        Terminal  Ileum: N/a      Specimens:   Specimen (24h ago, onward)       Start     Ordered    07/25/23 0922  Specimen to Pathology, Surgery Other  Once        Comments: Pre- Screening  Spec- Descending colon polyp  Post- Polyps  Proc- Colonoscopy   Dr JERROD Mccarty   References:    Click here for ordering Quick Tip   Question Answer Comment   Procedure Type: Other    Specimen Class: Routine/Screening        07/25/23 0935                      Estimated Blood Loss (EBL): none     Complications: No     Diagnostic Impression: colon polyp x 1      Recommendations: Repeat procedure in 5 years.    Disposition:  home     Attestation: I performed the procedure.        Follow Up:             Future Appointments   Date Time Provider Department Center   12/11/2023  8:00 AM Justice Mccarty MD [facility]           Justice Mccarty MD  7/25/2023

## 2023-07-25 NOTE — H&P
Subjective:    Sal Rowley is a 68 y.o. male here for colonoscopy    Past Medical History:   Diagnosis Date    Hypertension     Mixed hyperlipidemia     Osteoarthritis      Past Surgical History:   Procedure Laterality Date    CARPAL TUNNEL RELEASE Left 3/27/2023    Procedure: RELEASE, CARPAL TUNNEL;  Surgeon: Brooks Vivsa MD;  Location: [facility] OR;  Service: Orthopedics;  Laterality: Left;    CARPAL TUNNEL RELEASE Right 4/10/2023    Procedure: RELEASE, CARPAL TUNNEL;  Surgeon: Brooks Vivas MD;  Location: [facility] OR;  Service: Orthopedics;  Laterality: Right;    KNEE ARTHROSCOPY      x 3    ROTATOR CUFF REPAIR Right     TONSILLECTOMY       History reviewed. No pertinent family history.  Social History     Tobacco Use    Smoking status: Former     Types: Cigarettes     Quit date:      Years since quittin.5   Substance Use Topics    Alcohol use: Yes     Comment: 3 beers/day    Drug use: No       PTA Medications   Medication Sig    atorvastatin (LIPITOR) 20 MG tablet Take 20 mg by mouth every evening.    celecoxib (CELEBREX) 200 MG capsule Take 200 mg by mouth 2 (two) times daily as needed.    ergocalciferol (ERGOCALCIFEROL) 50,000 unit Cap Take 50,000 Units by mouth every 7 days.    HYDROcodone-acetaminophen (NORCO) 5-325 mg per tablet Take 1 tablet by mouth every 6 (six) hours as needed for Pain.    losartan (COZAAR) 100 MG tablet Take 1 tablet (100 mg total) by mouth once daily.    losartan (COZAAR) 50 MG tablet Take 50 mg by mouth once daily.    niacin 500 MG CpSR as needed.    pantoprazole (PROTONIX) 40 MG tablet Take 1 tablet (40 mg total) by mouth once daily.    tiotropium (SPIRIVA) 18 mcg inhalation capsule Inhale 18 mcg into the lungs once daily. Controller    traMADoL (ULTRAM) 50 mg tablet Take 50 mg by mouth every 6 (six) hours as needed for Pain.    TRELEGY ELLIPTA 100-62.5-25 mcg DsDv Inhale 1 puff into the lungs.       Review of patient's allergies indicates:  No Known  Allergies    Review of Systems   Constitutional: Negative for fever and chills.   HENT: Negative for hearing loss.    Eyes: Negative for blurred vision and double vision.   Respiratory: Negative for cough and shortness of breath.    Cardiovascular: Negative for chest pain and palpitations.   Gastrointestinal: Negative for heartburn, nausea, vomiting and abdominal pain.   Genitourinary: Negative for dysuria and frequency.   Musculoskeletal: Negative for myalgias, joint pain and falls.   Skin: Negative for itching and rash.   Neurological: Negative for dizziness, tingling and headaches.   Endo/Heme/Allergies: Does not bruise/bleed easily.   Psychiatric/Behavioral: Negative for depression and suicidal ideas.       Objective:        Temp:  [97.3 °F (36.3 °C)]   Pulse:  [57]   Resp:  [18]   BP: (146)/(86)   SpO2:  [98 %]       Physical Exam   Constitutional: He is oriented to person, place, and time. He appears well-developed and well-nourished.   HENT:   Head: Normocephalic and atraumatic.   Right Ear: External ear normal.   Left Ear: External ear normal.   Nose: Nose normal.   Mouth/Throat: Oropharynx is clear and moist.   Eyes: Conjunctivae normal and EOM are normal. Pupils are equal, round, and reactive to light. Right eye exhibits no discharge. Left eye exhibits no discharge. No scleral icterus.   Neck: Normal range of motion. Neck supple. No JVD present. No tracheal deviation present. No thyromegaly present.   Cardiovascular: Normal rate, regular rhythm, normal heart sounds and intact distal pulses.    No murmur heard.  Pulmonary/Chest: Effort normal and breath sounds normal. No respiratory distress. He has no wheezes. He has no rales. He exhibits no tenderness.   Abdominal: Soft. Bowel sounds are normal. He exhibits no distension and no mass. There is no tenderness. There is no rebound and no guarding.   Musculoskeletal: Normal range of motion.   Lymphadenopathy:     He has no cervical adenopathy.   Neurological:  He is alert and oriented to person, place, and time. He has normal reflexes. No cranial nerve deficit. He exhibits normal muscle tone. Coordination normal.   Skin: Skin is warm and dry.   Psychiatric: He has a normal mood and affect. His behavior is normal. Judgment and thought content normal.           Assessment:      There are no hospital problems to display for this patient.        Plan:    ASA grade 2. Proceed with MAC for colonoscopy    Patient cleared for Anesthesia:  MAC and general    Anesthesia/Surgery risks, benefits, and alternative options discussed and understood by patient/family.

## 2023-07-27 LAB
FINAL PATHOLOGIC DIAGNOSIS: NORMAL
GROSS: NORMAL
Lab: NORMAL

## 2023-07-27 NOTE — PROGRESS NOTES
No cancer in this polyp that I removed, but we will need to repeat this test in 5 years   Thanks

## 2023-09-27 ENCOUNTER — HOSPITAL ENCOUNTER (EMERGENCY)
Facility: HOSPITAL | Age: 68
Discharge: HOME | End: 2023-09-27
Attending: EMERGENCY MEDICINE
Payer: COMMERCIAL

## 2023-09-27 VITALS
TEMPERATURE: 97 F | RESPIRATION RATE: 20 BRPM | BODY MASS INDEX: 36.65 KG/M2 | SYSTOLIC BLOOD PRESSURE: 144 MMHG | WEIGHT: 220 LBS | HEART RATE: 88 BPM | OXYGEN SATURATION: 98 % | HEIGHT: 65 IN | DIASTOLIC BLOOD PRESSURE: 89 MMHG

## 2023-09-27 DIAGNOSIS — M25.562 LATERAL KNEE PAIN, LEFT: Primary | ICD-10-CM

## 2023-09-27 DIAGNOSIS — M25.569 KNEE PAIN: ICD-10-CM

## 2023-09-27 PROCEDURE — 63600175 PHARM REV CODE 636 W HCPCS: Performed by: NURSE PRACTITIONER

## 2023-09-27 PROCEDURE — 99284 EMERGENCY DEPT VISIT MOD MDM: CPT

## 2023-09-27 PROCEDURE — 96372 THER/PROPH/DIAG INJ SC/IM: CPT | Performed by: NURSE PRACTITIONER

## 2023-09-27 RX ORDER — KETOROLAC TROMETHAMINE 30 MG/ML
30 INJECTION, SOLUTION INTRAMUSCULAR; INTRAVENOUS
Status: COMPLETED | OUTPATIENT
Start: 2023-09-27 | End: 2023-09-27

## 2023-09-27 RX ORDER — CELECOXIB 200 MG/1
200 CAPSULE ORAL DAILY
Qty: 20 CAPSULE | Refills: 0 | Status: SHIPPED | OUTPATIENT
Start: 2023-09-27 | End: 2023-09-29

## 2023-09-27 RX ADMIN — KETOROLAC TROMETHAMINE 30 MG: 30 INJECTION, SOLUTION INTRAMUSCULAR at 04:09

## 2023-09-27 NOTE — Clinical Note
"Sal"Jaylyn Rowley was seen and treated in our emergency department on 9/27/2023.  He may return to work on 10/02/2023.       If you have any questions or concerns, please don't hesitate to call.      Adela Solomon MD"

## 2023-09-27 NOTE — ED PROVIDER NOTES
Encounter Date: 2023       History     Chief Complaint   Patient presents with    Leg Pain     Patient to ER CC of a left knee pain since working on a ladder, denies trauma      Chief complaint:  Knee pain  [age] year male history of hypertension high cholesterol and osteoarthritis presents to be evaluated for left knee pain that began yesterday.  Patient states he has been 5 hours worsening up and down a ladder and developed subsequent knee pain.  Reports 10/10 deep aching pain to the lateral aspect of the left knee.  Reports pain is worse with ambulating denies numbness or tingling.  Denies swelling or redness      Review of patient's allergies indicates:  No Known Allergies  Past Medical History:   Diagnosis Date    Hypertension     Mixed hyperlipidemia     Osteoarthritis      Past Surgical History:   Procedure Laterality Date    CARPAL TUNNEL RELEASE Left 3/27/2023    Procedure: RELEASE, CARPAL TUNNEL;  Surgeon: Brooks Vivas MD;  Location: [facility] OR;  Service: Orthopedics;  Laterality: Left;    CARPAL TUNNEL RELEASE Right 4/10/2023    Procedure: RELEASE, CARPAL TUNNEL;  Surgeon: Brooks Vivas MD;  Location: [facility] OR;  Service: Orthopedics;  Laterality: Right;    COLONOSCOPY N/A 2023    Procedure: COLONOSCOPY;  Surgeon: Justice Mccarty MD;  Location: [facility];  Service: Endoscopy;  Laterality: N/A;  Patient needing miralax prep and EKG.    KNEE ARTHROSCOPY      x 3    ROTATOR CUFF REPAIR Right     TONSILLECTOMY       History reviewed. No pertinent family history.  Social History     Tobacco Use    Smoking status: Former     Current packs/day: 0.00     Types: Cigarettes     Quit date:      Years since quittin.7   Substance Use Topics    Alcohol use: Yes     Comment: 3 beers/day    Drug use: No     Review of Systems   Musculoskeletal:  Positive for arthralgias, gait problem, joint swelling and myalgias.   Skin:  Negative for color change.   Neurological:  Negative for weakness and  numbness.       Physical Exam     Initial Vitals [09/27/23 1535]   BP Pulse Resp Temp SpO2   (!) 144/89 88 20 96.8 °F (36 °C) 98 %      MAP       --         Physical Exam    Nursing note and vitals reviewed.  Constitutional: He appears well-developed and well-nourished.   HENT:   Head: Normocephalic and atraumatic.   Cardiovascular:  Normal rate, regular rhythm, normal heart sounds and intact distal pulses.     Exam reveals no gallop and no friction rub.       No murmur heard.  Musculoskeletal:         General: Tenderness present. No edema. Normal range of motion.      Comments: Patient tenderness lateral aspect of the left knee.  No warmth no crepitus no erythema.  Patient does have full range of motion     Neurological: He is alert and oriented to person, place, and time.   Skin: Skin is warm and dry.   Psychiatric: He has a normal mood and affect. Thought content normal.         ED Course   Procedures  Labs Reviewed - No data to display       Imaging Results              X-Ray Knee 1 or 2 View Left (Final result)  Result time 09/27/23 16:06:50   Procedure changed from X-Ray Knee 3 View Left     Final result by Wilberto Mosher MD (09/27/23 16:06:50)                   Narrative:    EXAMINATION:  XR KNEE 1 OR 2 VIEW LEFT    CLINICAL HISTORY:  pain; Pain in unspecified knee    TECHNIQUE:  One or two views of the left knee were performed.    COMPARISON:  None    FINDINGS:  No acute fracture or malalignment.  Mild tricompartmental degenerative change.  Small knee joint effusion.  Atherosclerosis.      Electronically signed by: Wilberto Mosher  Date:    09/27/2023  Time:    16:06                                     Medications   ketorolac injection 30 mg (has no administration in time range)     Medical Decision Making  68-year-old male with left knee pain after working on a ladder for 5 hours   Differential diagnoses include knee strain, bursitis, ligamentous injury    Amount and/or Complexity of Data  Reviewed  Labs: ordered.     Details: X-ray of the left knee  Radiology: ordered.    Risk  Prescription drug management.  Risk Details: Patient has tenderness to the lateral aspect of the knee.  Full range of motion no warmth no crepitus no erythema   X-ray showed small joint effusion   Will refer to orthopedics given anti-inflammatories and instructed on RICE protocol                               Clinical Impression:   Final diagnoses:  [M25.569] Knee pain  [M25.562] Lateral knee pain, left (Primary)        ED Disposition Condition    Discharge Stable          ED Prescriptions       Medication Sig Dispense Start Date End Date Auth. Provider    celecoxib (CELEBREX) 200 MG capsule Take 1 capsule (200 mg total) by mouth once daily. 20 capsule 9/27/2023 -- Malini Hall NP          Follow-up Information    None          Malini Hall NP  09/27/23 7634

## 2023-09-29 ENCOUNTER — OFFICE VISIT (OUTPATIENT)
Dept: FAMILY MEDICINE | Facility: CLINIC | Age: 68
End: 2023-09-29
Payer: COMMERCIAL

## 2023-09-29 ENCOUNTER — CLINICAL SUPPORT (OUTPATIENT)
Dept: FAMILY MEDICINE | Facility: CLINIC | Age: 68
End: 2023-09-29
Payer: COMMERCIAL

## 2023-09-29 VITALS
DIASTOLIC BLOOD PRESSURE: 82 MMHG | HEIGHT: 65 IN | OXYGEN SATURATION: 97 % | HEART RATE: 67 BPM | BODY MASS INDEX: 36.97 KG/M2 | WEIGHT: 221.88 LBS | RESPIRATION RATE: 15 BRPM | SYSTOLIC BLOOD PRESSURE: 142 MMHG

## 2023-09-29 DIAGNOSIS — Z12.5 SCREENING FOR PROSTATE CANCER: ICD-10-CM

## 2023-09-29 DIAGNOSIS — M25.562 ACUTE PAIN OF LEFT KNEE: Primary | ICD-10-CM

## 2023-09-29 DIAGNOSIS — I10 PRIMARY HYPERTENSION: ICD-10-CM

## 2023-09-29 DIAGNOSIS — I10 ESSENTIAL HYPERTENSION: ICD-10-CM

## 2023-09-29 DIAGNOSIS — M17.12 PRIMARY OSTEOARTHRITIS OF LEFT KNEE: ICD-10-CM

## 2023-09-29 LAB
ALBUMIN SERPL BCP-MCNC: 4 G/DL (ref 3.5–5.2)
ALP SERPL-CCNC: 55 U/L (ref 55–135)
ALT SERPL W/O P-5'-P-CCNC: 103 U/L (ref 10–44)
ANION GAP SERPL CALC-SCNC: 9 MMOL/L (ref 8–16)
AST SERPL-CCNC: 74 U/L (ref 10–40)
BASOPHILS # BLD AUTO: 0.02 K/UL (ref 0–0.2)
BASOPHILS NFR BLD: 0.3 % (ref 0–1.9)
BILIRUB SERPL-MCNC: 0.5 MG/DL (ref 0.1–1)
BUN SERPL-MCNC: 18 MG/DL (ref 8–23)
CALCIUM SERPL-MCNC: 9.4 MG/DL (ref 8.7–10.5)
CHLORIDE SERPL-SCNC: 104 MMOL/L (ref 95–110)
CHOLEST SERPL-MCNC: 142 MG/DL (ref 120–199)
CHOLEST/HDLC SERPL: 2.4 {RATIO} (ref 2–5)
CO2 SERPL-SCNC: 25 MMOL/L (ref 23–29)
COMPLEXED PSA SERPL-MCNC: 1.5 NG/ML (ref 0–4)
CREAT SERPL-MCNC: 1 MG/DL (ref 0.5–1.4)
DIFFERENTIAL METHOD: ABNORMAL
EOSINOPHIL # BLD AUTO: 0.3 K/UL (ref 0–0.5)
EOSINOPHIL NFR BLD: 3.5 % (ref 0–8)
ERYTHROCYTE [DISTWIDTH] IN BLOOD BY AUTOMATED COUNT: 13.7 % (ref 11.5–14.5)
EST. GFR  (NO RACE VARIABLE): >60 ML/MIN/1.73 M^2
ESTIMATED AVG GLUCOSE: 120 MG/DL (ref 68–131)
GLUCOSE SERPL-MCNC: 103 MG/DL (ref 70–110)
HBA1C MFR BLD: 5.8 % (ref 4–5.6)
HCT VFR BLD AUTO: 43.2 % (ref 40–54)
HDLC SERPL-MCNC: 60 MG/DL (ref 40–75)
HDLC SERPL: 42.3 % (ref 20–50)
HGB BLD-MCNC: 14.7 G/DL (ref 14–18)
IMM GRANULOCYTES # BLD AUTO: 0.04 K/UL (ref 0–0.04)
IMM GRANULOCYTES NFR BLD AUTO: 0.6 % (ref 0–0.5)
LDLC SERPL CALC-MCNC: 72.4 MG/DL (ref 63–159)
LYMPHOCYTES # BLD AUTO: 2.5 K/UL (ref 1–4.8)
LYMPHOCYTES NFR BLD: 34.4 % (ref 18–48)
MCH RBC QN AUTO: 33 PG (ref 27–31)
MCHC RBC AUTO-ENTMCNC: 34 G/DL (ref 32–36)
MCV RBC AUTO: 97 FL (ref 82–98)
MONOCYTES # BLD AUTO: 0.7 K/UL (ref 0.3–1)
MONOCYTES NFR BLD: 10 % (ref 4–15)
NEUTROPHILS # BLD AUTO: 3.7 K/UL (ref 1.8–7.7)
NEUTROPHILS NFR BLD: 51.2 % (ref 38–73)
NONHDLC SERPL-MCNC: 82 MG/DL
NRBC BLD-RTO: 0 /100 WBC
PLATELET # BLD AUTO: 235 K/UL (ref 150–450)
PMV BLD AUTO: 9.9 FL (ref 9.2–12.9)
POTASSIUM SERPL-SCNC: 4.7 MMOL/L (ref 3.5–5.1)
PROT SERPL-MCNC: 7.6 G/DL (ref 6–8.4)
RBC # BLD AUTO: 4.45 M/UL (ref 4.6–6.2)
SODIUM SERPL-SCNC: 138 MMOL/L (ref 136–145)
TRIGL SERPL-MCNC: 48 MG/DL (ref 30–150)
TSH SERPL DL<=0.005 MIU/L-ACNC: 1.77 UIU/ML (ref 0.4–4)
WBC # BLD AUTO: 7.13 K/UL (ref 3.9–12.7)

## 2023-09-29 PROCEDURE — 99214 PR OFFICE/OUTPT VISIT, EST, LEVL IV, 30-39 MIN: ICD-10-PCS | Mod: S$GLB,,, | Performed by: FAMILY MEDICINE

## 2023-09-29 PROCEDURE — 36415 COLL VENOUS BLD VENIPUNCTURE: CPT | Mod: S$GLB,,, | Performed by: FAMILY MEDICINE

## 2023-09-29 PROCEDURE — 99999 PR PBB SHADOW E&M-EST. PATIENT-LVL IV: ICD-10-PCS | Mod: PBBFAC,,, | Performed by: FAMILY MEDICINE

## 2023-09-29 PROCEDURE — 85025 COMPLETE CBC W/AUTO DIFF WBC: CPT | Performed by: FAMILY MEDICINE

## 2023-09-29 PROCEDURE — 84443 ASSAY THYROID STIM HORMONE: CPT | Performed by: FAMILY MEDICINE

## 2023-09-29 PROCEDURE — 36415 PR COLLECTION VENOUS BLOOD,VENIPUNCTURE: ICD-10-PCS | Mod: S$GLB,,, | Performed by: FAMILY MEDICINE

## 2023-09-29 PROCEDURE — 80053 COMPREHEN METABOLIC PANEL: CPT | Performed by: FAMILY MEDICINE

## 2023-09-29 PROCEDURE — 83036 HEMOGLOBIN GLYCOSYLATED A1C: CPT | Performed by: FAMILY MEDICINE

## 2023-09-29 PROCEDURE — 84153 ASSAY OF PSA TOTAL: CPT | Performed by: FAMILY MEDICINE

## 2023-09-29 PROCEDURE — 80061 LIPID PANEL: CPT | Performed by: FAMILY MEDICINE

## 2023-09-29 PROCEDURE — 99214 OFFICE O/P EST MOD 30 MIN: CPT | Mod: S$GLB,,, | Performed by: FAMILY MEDICINE

## 2023-09-29 PROCEDURE — 99999 PR PBB SHADOW E&M-EST. PATIENT-LVL IV: CPT | Mod: PBBFAC,,, | Performed by: FAMILY MEDICINE

## 2023-09-29 RX ORDER — OLMESARTAN MEDOXOMIL 40 MG/1
40 TABLET ORAL DAILY
Qty: 30 TABLET | Refills: 5 | Status: SHIPPED | OUTPATIENT
Start: 2023-09-29 | End: 2024-03-28 | Stop reason: SDUPTHER

## 2023-09-29 RX ORDER — SILDENAFIL CITRATE 20 MG/1
TABLET ORAL
COMMUNITY
End: 2023-11-30 | Stop reason: SDUPTHER

## 2023-09-29 RX ORDER — MELOXICAM 15 MG/1
TABLET ORAL
COMMUNITY
End: 2023-09-29

## 2023-09-29 RX ORDER — CELECOXIB 200 MG/1
200 CAPSULE ORAL DAILY PRN
Qty: 30 CAPSULE | Refills: 2 | Status: SHIPPED | OUTPATIENT
Start: 2023-09-29 | End: 2024-01-11

## 2023-09-29 NOTE — PROGRESS NOTES
Subjective:       Patient ID: Sal Rowley is a 68 y.o. male.    Chief Complaint: ER follow up (Pt states he has been having left knee pain/He states it hurts when putting weight on or off of it)    Pt is a 68 y.o. male who presents for evaluation and management of   Encounter Diagnoses   Name Primary?    Acute pain of left knee Yes    Primary osteoarthritis of left knee     Primary hypertension     Essential hypertension     Screening for prostate cancer    .  Doing well on current meds. Denies any side effects. Prevention is up to date.    Review of Systems   Constitutional:  Negative for fever.   Respiratory:  Negative for shortness of breath.    Cardiovascular:  Negative for chest pain.   Gastrointestinal:  Negative for anal bleeding and blood in stool.   Genitourinary:  Negative for dysuria.   Musculoskeletal:  Positive for arthralgias. Negative for back pain and joint swelling.   Neurological:  Negative for dizziness and light-headedness.       Objective:      Physical Exam  Constitutional:       Appearance: Normal appearance. He is well-developed. He is not ill-appearing.   HENT:      Head: Normocephalic and atraumatic.      Right Ear: External ear normal.      Left Ear: External ear normal.      Nose: Nose normal.      Mouth/Throat:      Mouth: Mucous membranes are moist.      Pharynx: No oropharyngeal exudate or posterior oropharyngeal erythema.   Eyes:      General: No scleral icterus.        Right eye: No discharge.         Left eye: No discharge.      Conjunctiva/sclera: Conjunctivae normal.      Pupils: Pupils are equal, round, and reactive to light.   Neck:      Thyroid: No thyromegaly.      Vascular: No JVD.      Trachea: No tracheal deviation.   Cardiovascular:      Rate and Rhythm: Normal rate and regular rhythm.      Heart sounds: Normal heart sounds. No murmur heard.  Pulmonary:      Effort: Pulmonary effort is normal. No respiratory distress.      Breath sounds: Normal breath sounds. No  wheezing or rales.   Chest:      Chest wall: No tenderness.   Abdominal:      General: Bowel sounds are normal. There is no distension.      Palpations: Abdomen is soft. There is no mass.      Tenderness: There is no abdominal tenderness. There is no guarding or rebound.   Musculoskeletal:         General: Normal range of motion.      Cervical back: Normal range of motion and neck supple.      Right lower leg: No edema.      Left lower leg: No edema.      Comments: Negative lachmann's, no joint laxity with varus or valgus stress. No effusion. No PF pain    Some posterior lateral pain with Eileen but no definite click    Lymphadenopathy:      Cervical: No cervical adenopathy.   Skin:     General: Skin is warm and dry.   Neurological:      Mental Status: He is alert and oriented to person, place, and time.      Cranial Nerves: No cranial nerve deficit.      Motor: No abnormal muscle tone.      Coordination: Coordination normal.      Deep Tendon Reflexes: Reflexes are normal and symmetric. Reflexes normal.   Psychiatric:         Behavior: Behavior normal.         Thought Content: Thought content normal.         Judgment: Judgment normal.         Assessment:       1. Acute pain of left knee    2. Primary osteoarthritis of left knee    3. Primary hypertension    4. Essential hypertension    5. Screening for prostate cancer        Plan:   1. Acute pain of left knee  -     celecoxib (CELEBREX) 200 MG capsule; Take 1 capsule (200 mg total) by mouth daily as needed.  Dispense: 30 capsule; Refill: 2    2. Primary osteoarthritis of left knee  -     Ambulatory referral/consult to Physical/Occupational Therapy; Future; Expected date: 10/06/2023  -     celecoxib (CELEBREX) 200 MG capsule; Take 1 capsule (200 mg total) by mouth daily as needed.  Dispense: 30 capsule; Refill: 2    3. Primary hypertension    4. Essential hypertension  -     olmesartan (BENICAR) 40 MG tablet; Take 1 tablet (40 mg total) by mouth once daily.   Dispense: 30 tablet; Refill: 5  -     CBC Auto Differential; Future; Expected date: 09/29/2023  -     Comprehensive Metabolic Panel; Future; Expected date: 09/29/2023  -     Lipid Panel; Future; Expected date: 09/29/2023  -     Hemoglobin A1C; Future; Expected date: 09/29/2023  -     TSH; Future; Expected date: 09/29/2023    5. Screening for prostate cancer  -     PSA, Screening; Future; Expected date: 09/29/2023      No follow-ups on file.

## 2023-09-29 NOTE — LETTER
September 29, 2023      [address] [address]  Phone: [phone]  Fax: [phone]       Patient: Sal Rowley   YOB: 1955  Date of Visit: 09/29/2023    To Whom It May Concern:    Sal Rowley is not allowed to bend, squat, or climb ladders.If you have any questions or concerns, or if I can be of further assistance, please do not hesitate to contact me.    Sincerely,    EMELIA Figueredo MD

## 2023-10-01 NOTE — PROGRESS NOTES
Labs look good except that his liver enzymes are up----did he take a bunch of tylenol maybe with that knee pain??? If not we may need to do a little work up...

## 2023-10-02 ENCOUNTER — TELEPHONE (OUTPATIENT)
Dept: FAMILY MEDICINE | Facility: CLINIC | Age: 68
End: 2023-10-02

## 2023-10-02 DIAGNOSIS — R74.8 ELEVATED LIVER ENZYMES: Primary | ICD-10-CM

## 2023-10-03 NOTE — TELEPHONE ENCOUNTER
----- Message from RT Nydia sent at 10/2/2023  1:48 PM CDT -----  Patient notified of lab results.  Patient verbalized understanding and will call with any questions or concerns. Patient states he did not take any tylenol but did have some Rtaci the night before his blood work.  What do you want him to do?

## 2023-10-03 NOTE — TELEPHONE ENCOUNTER
----- Message from RT Nydia sent at 10/2/2023  1:48 PM CDT -----  Patient notified of lab results.  Patient verbalized understanding and will call with any questions or concerns. Patient states he did not take any tylenol but did have some Traci the night before his blood work.  What do you want him to do?

## 2023-10-06 ENCOUNTER — HOSPITAL ENCOUNTER (OUTPATIENT)
Dept: RADIOLOGY | Facility: HOSPITAL | Age: 68
Discharge: HOME | End: 2023-10-06
Attending: FAMILY MEDICINE
Payer: COMMERCIAL

## 2023-10-06 DIAGNOSIS — R74.8 ELEVATED LIVER ENZYMES: ICD-10-CM

## 2023-10-06 PROCEDURE — 76705 ECHO EXAM OF ABDOMEN: CPT | Mod: TC

## 2023-10-08 NOTE — PROGRESS NOTES
He has a fatty liver---The patient is asked to make an attempt to improve diet and exercise patterns to aid in medical management of this problem.  Cut out white carbs and sugar: no bread, rice, pasta, potatoes and no beer. Exercise/walk 5x/week for at least 30 minutes  .

## 2023-10-12 ENCOUNTER — OFFICE VISIT (OUTPATIENT)
Dept: FAMILY MEDICINE | Facility: CLINIC | Age: 68
End: 2023-10-12
Payer: COMMERCIAL

## 2023-10-12 VITALS
HEART RATE: 78 BPM | SYSTOLIC BLOOD PRESSURE: 138 MMHG | WEIGHT: 217.25 LBS | BODY MASS INDEX: 36.19 KG/M2 | RESPIRATION RATE: 16 BRPM | OXYGEN SATURATION: 97 % | DIASTOLIC BLOOD PRESSURE: 82 MMHG | HEIGHT: 65 IN

## 2023-10-12 DIAGNOSIS — M25.562 ACUTE PAIN OF LEFT KNEE: Primary | ICD-10-CM

## 2023-10-12 PROCEDURE — 4010F ACE/ARB THERAPY RXD/TAKEN: CPT | Mod: CPTII,S$GLB,, | Performed by: FAMILY MEDICINE

## 2023-10-12 PROCEDURE — 3079F DIAST BP 80-89 MM HG: CPT | Mod: CPTII,S$GLB,, | Performed by: FAMILY MEDICINE

## 2023-10-12 PROCEDURE — 3044F PR MOST RECENT HEMOGLOBIN A1C LEVEL <7.0%: ICD-10-PCS | Mod: CPTII,S$GLB,, | Performed by: FAMILY MEDICINE

## 2023-10-12 PROCEDURE — 3075F SYST BP GE 130 - 139MM HG: CPT | Mod: CPTII,S$GLB,, | Performed by: FAMILY MEDICINE

## 2023-10-12 PROCEDURE — 3044F HG A1C LEVEL LT 7.0%: CPT | Mod: CPTII,S$GLB,, | Performed by: FAMILY MEDICINE

## 2023-10-12 PROCEDURE — 3008F PR BODY MASS INDEX (BMI) DOCUMENTED: ICD-10-PCS | Mod: CPTII,S$GLB,, | Performed by: FAMILY MEDICINE

## 2023-10-12 PROCEDURE — 99212 PR OFFICE/OUTPT VISIT, EST, LEVL II, 10-19 MIN: ICD-10-PCS | Mod: S$GLB,,, | Performed by: FAMILY MEDICINE

## 2023-10-12 PROCEDURE — 99999 PR PBB SHADOW E&M-EST. PATIENT-LVL III: ICD-10-PCS | Mod: PBBFAC,,, | Performed by: FAMILY MEDICINE

## 2023-10-12 PROCEDURE — 3075F PR MOST RECENT SYSTOLIC BLOOD PRESS GE 130-139MM HG: ICD-10-PCS | Mod: CPTII,S$GLB,, | Performed by: FAMILY MEDICINE

## 2023-10-12 PROCEDURE — 1101F PT FALLS ASSESS-DOCD LE1/YR: CPT | Mod: CPTII,S$GLB,, | Performed by: FAMILY MEDICINE

## 2023-10-12 PROCEDURE — 3008F BODY MASS INDEX DOCD: CPT | Mod: CPTII,S$GLB,, | Performed by: FAMILY MEDICINE

## 2023-10-12 PROCEDURE — 99212 OFFICE O/P EST SF 10 MIN: CPT | Mod: S$GLB,,, | Performed by: FAMILY MEDICINE

## 2023-10-12 PROCEDURE — 1126F PR PAIN SEVERITY QUANTIFIED, NO PAIN PRESENT: ICD-10-PCS | Mod: CPTII,S$GLB,, | Performed by: FAMILY MEDICINE

## 2023-10-12 PROCEDURE — 3079F PR MOST RECENT DIASTOLIC BLOOD PRESSURE 80-89 MM HG: ICD-10-PCS | Mod: CPTII,S$GLB,, | Performed by: FAMILY MEDICINE

## 2023-10-12 PROCEDURE — 1126F AMNT PAIN NOTED NONE PRSNT: CPT | Mod: CPTII,S$GLB,, | Performed by: FAMILY MEDICINE

## 2023-10-12 PROCEDURE — 1101F PR PT FALLS ASSESS DOC 0-1 FALLS W/OUT INJ PAST YR: ICD-10-PCS | Mod: CPTII,S$GLB,, | Performed by: FAMILY MEDICINE

## 2023-10-12 PROCEDURE — 99999 PR PBB SHADOW E&M-EST. PATIENT-LVL III: CPT | Mod: PBBFAC,,, | Performed by: FAMILY MEDICINE

## 2023-10-12 PROCEDURE — 1159F PR MEDICATION LIST DOCUMENTED IN MEDICAL RECORD: ICD-10-PCS | Mod: CPTII,S$GLB,, | Performed by: FAMILY MEDICINE

## 2023-10-12 PROCEDURE — 1159F MED LIST DOCD IN RCRD: CPT | Mod: CPTII,S$GLB,, | Performed by: FAMILY MEDICINE

## 2023-10-12 PROCEDURE — 3288F PR FALLS RISK ASSESSMENT DOCUMENTED: ICD-10-PCS | Mod: CPTII,S$GLB,, | Performed by: FAMILY MEDICINE

## 2023-10-12 PROCEDURE — 3288F FALL RISK ASSESSMENT DOCD: CPT | Mod: CPTII,S$GLB,, | Performed by: FAMILY MEDICINE

## 2023-10-12 PROCEDURE — 4010F PR ACE/ARB THEARPY RXD/TAKEN: ICD-10-PCS | Mod: CPTII,S$GLB,, | Performed by: FAMILY MEDICINE

## 2023-10-12 NOTE — PROGRESS NOTES
Subjective:       Patient ID: Sal Rowley is a 68 y.o. male.    Chief Complaint: Follow-up (Pt is here for knee pain)    Pt is a 68 y.o. male who presents for evaluation and management of   Encounter Diagnosis   Name Primary?    Acute pain of left knee Yes   .  Doing well on current meds. Denies any side effects. Prevention is up to date.  Review of Systems   Constitutional:  Negative for fever.   Respiratory:  Negative for shortness of breath.    Cardiovascular:  Negative for chest pain.   Gastrointestinal:  Negative for anal bleeding and blood in stool.   Genitourinary:  Negative for dysuria.   Musculoskeletal:  Positive for arthralgias.   Neurological:  Negative for dizziness and light-headedness.       Objective:      Physical Exam  Constitutional:       Appearance: Normal appearance. He is well-developed. He is not ill-appearing.   HENT:      Head: Normocephalic and atraumatic.      Right Ear: External ear normal.      Left Ear: External ear normal.      Nose: Nose normal.      Mouth/Throat:      Mouth: Mucous membranes are moist.      Pharynx: No oropharyngeal exudate or posterior oropharyngeal erythema.   Eyes:      General: No scleral icterus.        Right eye: No discharge.         Left eye: No discharge.      Conjunctiva/sclera: Conjunctivae normal.      Pupils: Pupils are equal, round, and reactive to light.   Neck:      Thyroid: No thyromegaly.      Vascular: No JVD.      Trachea: No tracheal deviation.   Cardiovascular:      Rate and Rhythm: Normal rate and regular rhythm.      Heart sounds: Normal heart sounds. No murmur heard.  Pulmonary:      Effort: Pulmonary effort is normal. No respiratory distress.      Breath sounds: Normal breath sounds. No wheezing or rales.   Chest:      Chest wall: No tenderness.   Abdominal:      General: Bowel sounds are normal. There is no distension.      Palpations: Abdomen is soft. There is no mass.      Tenderness: There is no abdominal tenderness. There is no  guarding or rebound.   Musculoskeletal:         General: Tenderness present. Normal range of motion.      Cervical back: Normal range of motion and neck supple.      Right lower leg: No edema.      Left lower leg: No edema.      Comments: Negative lachmann's, no joint laxity with varus or valgus stress. No effusion. No PF pain    Some posterior lateral pain with Eileen but no definite click    Lymphadenopathy:      Cervical: No cervical adenopathy.   Skin:     General: Skin is warm and dry.   Neurological:      Mental Status: He is alert and oriented to person, place, and time.      Cranial Nerves: No cranial nerve deficit.      Motor: No abnormal muscle tone.      Coordination: Coordination normal.      Deep Tendon Reflexes: Reflexes are normal and symmetric. Reflexes normal.   Psychiatric:         Behavior: Behavior normal.         Thought Content: Thought content normal.         Judgment: Judgment normal.         Assessment:       1. Acute pain of left knee        Plan:   1. Acute pain of left knee    Suspected meniscus injury   Starts PT next week 3x/week for 4 weeks   Light duty until then   RTC 1 month   No follow-ups on file.

## 2023-11-21 ENCOUNTER — OFFICE VISIT (OUTPATIENT)
Dept: FAMILY MEDICINE | Facility: CLINIC | Age: 68
End: 2023-11-21
Payer: COMMERCIAL

## 2023-11-21 VITALS
HEART RATE: 72 BPM | HEIGHT: 65 IN | SYSTOLIC BLOOD PRESSURE: 122 MMHG | WEIGHT: 215.63 LBS | RESPIRATION RATE: 18 BRPM | DIASTOLIC BLOOD PRESSURE: 78 MMHG | OXYGEN SATURATION: 98 % | BODY MASS INDEX: 35.93 KG/M2

## 2023-11-21 DIAGNOSIS — S89.92XD KNEE INJURY, LEFT, SUBSEQUENT ENCOUNTER: Primary | ICD-10-CM

## 2023-11-21 PROCEDURE — 99999 PR PBB SHADOW E&M-EST. PATIENT-LVL III: CPT | Mod: PBBFAC,,, | Performed by: FAMILY MEDICINE

## 2023-11-21 PROCEDURE — 99213 OFFICE O/P EST LOW 20 MIN: CPT | Mod: S$GLB,,, | Performed by: FAMILY MEDICINE

## 2023-11-21 PROCEDURE — 99999 PR PBB SHADOW E&M-EST. PATIENT-LVL III: ICD-10-PCS | Mod: PBBFAC,,, | Performed by: FAMILY MEDICINE

## 2023-11-21 PROCEDURE — 99213 PR OFFICE/OUTPT VISIT, EST, LEVL III, 20-29 MIN: ICD-10-PCS | Mod: S$GLB,,, | Performed by: FAMILY MEDICINE

## 2023-11-21 NOTE — LETTER
[address] [address]  Phone: [phone]  Fax: [phone] November 21, 2023     Patient: Sal Rowley   YOB: 1955   Date of Visit: 11/21/2023       To Whom It May Concern:    Mr. Rowley is cleared to go back to work full duty, no restrictions, on Sunday the 26th of November.     If you have any questions or concerns, please don't hesitate to contact my office.    Sincerely,        Justice Mccarty MD

## 2023-11-21 NOTE — PROGRESS NOTES
Subjective:       Patient ID: Sal Rowley is a 68 y.o. male.    Chief Complaint: Knee Pain (Left) and Follow-up (1 month)    Pt is a 68 y.o. male who presents for evaluation and management of   Encounter Diagnosis   Name Primary?    Knee injury, left, subsequent encounter Yes   .suspected meniscus injury. Also treated for hamstring strain   finished PT yesterday. 6 weeks   Feels much better. Climbing stairs and squatting without pain     Doing well on current meds. Denies any side effects. Prevention is up to date.    Review of Systems   Musculoskeletal:  Negative for joint swelling.       Objective:      Physical Exam  Constitutional:       Appearance: Normal appearance. He is well-developed. He is not ill-appearing.   HENT:      Head: Normocephalic and atraumatic.      Right Ear: External ear normal.      Left Ear: External ear normal.      Nose: Nose normal.      Mouth/Throat:      Mouth: Mucous membranes are moist.      Pharynx: No oropharyngeal exudate or posterior oropharyngeal erythema.   Eyes:      General: No scleral icterus.        Right eye: No discharge.         Left eye: No discharge.      Conjunctiva/sclera: Conjunctivae normal.      Pupils: Pupils are equal, round, and reactive to light.   Neck:      Thyroid: No thyromegaly.      Vascular: No JVD.      Trachea: No tracheal deviation.   Cardiovascular:      Rate and Rhythm: Normal rate and regular rhythm.      Heart sounds: Normal heart sounds. No murmur heard.  Pulmonary:      Effort: Pulmonary effort is normal. No respiratory distress.      Breath sounds: Normal breath sounds. No wheezing or rales.   Chest:      Chest wall: No tenderness.   Abdominal:      General: Bowel sounds are normal. There is no distension.      Palpations: Abdomen is soft. There is no mass.      Tenderness: There is no abdominal tenderness. There is no guarding or rebound.   Musculoskeletal:         General: No tenderness. Normal range of motion.      Cervical back:  Normal range of motion and neck supple.      Right lower leg: No edema.      Left lower leg: No edema.      Comments: Squats and walks up and down flight of stairs without pain      Lymphadenopathy:      Cervical: No cervical adenopathy.   Skin:     General: Skin is warm and dry.   Neurological:      Mental Status: He is alert and oriented to person, place, and time.      Cranial Nerves: No cranial nerve deficit.      Motor: No abnormal muscle tone.      Coordination: Coordination normal.      Deep Tendon Reflexes: Reflexes are normal and symmetric. Reflexes normal.   Psychiatric:         Behavior: Behavior normal.         Thought Content: Thought content normal.         Judgment: Judgment normal.         Assessment:       1. Knee injury, left, subsequent encounter        Plan:   1. Knee injury, left, subsequent encounter    Cleared to resume work full duty. No restrictions     No follow-ups on file.

## 2023-11-30 ENCOUNTER — PATIENT OUTREACH (OUTPATIENT)
Dept: ADMINISTRATIVE | Facility: HOSPITAL | Age: 68
End: 2023-11-30
Payer: COMMERCIAL

## 2023-11-30 ENCOUNTER — OFFICE VISIT (OUTPATIENT)
Dept: NEUROLOGY | Facility: CLINIC | Age: 68
End: 2023-11-30
Payer: COMMERCIAL

## 2023-11-30 VITALS
SYSTOLIC BLOOD PRESSURE: 166 MMHG | WEIGHT: 218.94 LBS | BODY MASS INDEX: 36.48 KG/M2 | HEIGHT: 65 IN | HEART RATE: 75 BPM | DIASTOLIC BLOOD PRESSURE: 93 MMHG | RESPIRATION RATE: 16 BRPM

## 2023-11-30 DIAGNOSIS — M43.16 SPONDYLOLISTHESIS OF LUMBAR REGION: ICD-10-CM

## 2023-11-30 DIAGNOSIS — M54.9 DORSALGIA, UNSPECIFIED: ICD-10-CM

## 2023-11-30 DIAGNOSIS — N52.9 ERECTILE DYSFUNCTION, UNSPECIFIED ERECTILE DYSFUNCTION TYPE: Primary | ICD-10-CM

## 2023-11-30 DIAGNOSIS — R20.0 RIGHT LEG NUMBNESS: ICD-10-CM

## 2023-11-30 DIAGNOSIS — M54.50 LUMBAR PAIN: Primary | ICD-10-CM

## 2023-11-30 PROCEDURE — 1125F AMNT PAIN NOTED PAIN PRSNT: CPT | Mod: CPTII,S$GLB,, | Performed by: PSYCHIATRY & NEUROLOGY

## 2023-11-30 PROCEDURE — 3008F BODY MASS INDEX DOCD: CPT | Mod: CPTII,S$GLB,, | Performed by: PSYCHIATRY & NEUROLOGY

## 2023-11-30 PROCEDURE — 99999 PR PBB SHADOW E&M-EST. PATIENT-LVL III: CPT | Mod: PBBFAC,,, | Performed by: PSYCHIATRY & NEUROLOGY

## 2023-11-30 PROCEDURE — 1125F PR PAIN SEVERITY QUANTIFIED, PAIN PRESENT: ICD-10-PCS | Mod: CPTII,S$GLB,, | Performed by: PSYCHIATRY & NEUROLOGY

## 2023-11-30 PROCEDURE — 4010F PR ACE/ARB THEARPY RXD/TAKEN: ICD-10-PCS | Mod: CPTII,S$GLB,, | Performed by: PSYCHIATRY & NEUROLOGY

## 2023-11-30 PROCEDURE — 3077F PR MOST RECENT SYSTOLIC BLOOD PRESSURE >= 140 MM HG: ICD-10-PCS | Mod: CPTII,S$GLB,, | Performed by: PSYCHIATRY & NEUROLOGY

## 2023-11-30 PROCEDURE — 3080F PR MOST RECENT DIASTOLIC BLOOD PRESSURE >= 90 MM HG: ICD-10-PCS | Mod: CPTII,S$GLB,, | Performed by: PSYCHIATRY & NEUROLOGY

## 2023-11-30 PROCEDURE — 1159F MED LIST DOCD IN RCRD: CPT | Mod: CPTII,S$GLB,, | Performed by: PSYCHIATRY & NEUROLOGY

## 2023-11-30 PROCEDURE — 99999 PR PBB SHADOW E&M-EST. PATIENT-LVL III: ICD-10-PCS | Mod: PBBFAC,,, | Performed by: PSYCHIATRY & NEUROLOGY

## 2023-11-30 PROCEDURE — 99204 PR OFFICE/OUTPT VISIT, NEW, LEVL IV, 45-59 MIN: ICD-10-PCS | Mod: S$GLB,,, | Performed by: PSYCHIATRY & NEUROLOGY

## 2023-11-30 PROCEDURE — 3044F PR MOST RECENT HEMOGLOBIN A1C LEVEL <7.0%: ICD-10-PCS | Mod: CPTII,S$GLB,, | Performed by: PSYCHIATRY & NEUROLOGY

## 2023-11-30 PROCEDURE — 1160F PR REVIEW ALL MEDS BY PRESCRIBER/CLIN PHARMACIST DOCUMENTED: ICD-10-PCS | Mod: CPTII,S$GLB,, | Performed by: PSYCHIATRY & NEUROLOGY

## 2023-11-30 PROCEDURE — 1159F PR MEDICATION LIST DOCUMENTED IN MEDICAL RECORD: ICD-10-PCS | Mod: CPTII,S$GLB,, | Performed by: PSYCHIATRY & NEUROLOGY

## 2023-11-30 PROCEDURE — 3044F HG A1C LEVEL LT 7.0%: CPT | Mod: CPTII,S$GLB,, | Performed by: PSYCHIATRY & NEUROLOGY

## 2023-11-30 PROCEDURE — 3077F SYST BP >= 140 MM HG: CPT | Mod: CPTII,S$GLB,, | Performed by: PSYCHIATRY & NEUROLOGY

## 2023-11-30 PROCEDURE — 3080F DIAST BP >= 90 MM HG: CPT | Mod: CPTII,S$GLB,, | Performed by: PSYCHIATRY & NEUROLOGY

## 2023-11-30 PROCEDURE — 3008F PR BODY MASS INDEX (BMI) DOCUMENTED: ICD-10-PCS | Mod: CPTII,S$GLB,, | Performed by: PSYCHIATRY & NEUROLOGY

## 2023-11-30 PROCEDURE — 99204 OFFICE O/P NEW MOD 45 MIN: CPT | Mod: S$GLB,,, | Performed by: PSYCHIATRY & NEUROLOGY

## 2023-11-30 PROCEDURE — 1160F RVW MEDS BY RX/DR IN RCRD: CPT | Mod: CPTII,S$GLB,, | Performed by: PSYCHIATRY & NEUROLOGY

## 2023-11-30 PROCEDURE — 4010F ACE/ARB THERAPY RXD/TAKEN: CPT | Mod: CPTII,S$GLB,, | Performed by: PSYCHIATRY & NEUROLOGY

## 2023-11-30 RX ORDER — SILDENAFIL CITRATE 20 MG/1
TABLET ORAL
Qty: 30 TABLET | Refills: 5 | Status: SHIPPED | OUTPATIENT
Start: 2023-11-30 | End: 2024-01-19 | Stop reason: SDUPTHER

## 2023-11-30 NOTE — TELEPHONE ENCOUNTER
----- Message from Latoya Leal sent at 2023 11:52 AM CST -----  Contact: self  Sal Rowley  MRN: [id number]  : 1955  PCP: Justice Mccarty  Home Phone      [phone]  Work Phone      Not on file.  Mobile          [phone]  Home Phone      Not on file.      MESSAGE:   Pt requesting refill or new Rx.   Is this a refill or new RX:  refill  RX name and strength: sildenafil (REVATIO) 20 mg Tab  Last office visit: 23  Is this a 30-day or 90-day RX:  30  Pharmacy name and location:  [facility] [address] [place] [address]      Comments:      Phone:  [phone]
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
## 2023-11-30 NOTE — PROGRESS NOTES
Updates were requested from care everywhere.  Health Maintenance has been updated.  [facility] immunization registry triggered.  Immunizations were reconciled.  Per RAVEN in basket message, pt declined flu vaccine 11/30/2023.

## 2023-11-30 NOTE — TELEPHONE ENCOUNTER
No care due was identified.  [facility] Embedded Care Due Messages. Reference number: [id number].   11/30/2023 4:10:04 PM CST

## 2023-12-11 ENCOUNTER — HOSPITAL ENCOUNTER (OUTPATIENT)
Dept: RADIOLOGY | Facility: HOSPITAL | Age: 68
Discharge: HOME | End: 2023-12-11
Attending: PSYCHIATRY & NEUROLOGY
Payer: COMMERCIAL

## 2023-12-11 DIAGNOSIS — M43.16 SPONDYLOLISTHESIS OF LUMBAR REGION: ICD-10-CM

## 2023-12-11 DIAGNOSIS — M54.9 DORSALGIA, UNSPECIFIED: ICD-10-CM

## 2023-12-11 DIAGNOSIS — M54.50 LUMBAR PAIN: ICD-10-CM

## 2023-12-11 PROCEDURE — 72148 MRI LUMBAR SPINE WITHOUT CONTRAST: ICD-10-PCS | Mod: 26,,, | Performed by: RADIOLOGY

## 2023-12-11 PROCEDURE — 72148 MRI LUMBAR SPINE W/O DYE: CPT | Mod: TC

## 2023-12-11 PROCEDURE — 72148 MRI LUMBAR SPINE W/O DYE: CPT | Mod: 26,,, | Performed by: RADIOLOGY

## 2024-01-11 DIAGNOSIS — M25.562 ACUTE PAIN OF LEFT KNEE: ICD-10-CM

## 2024-01-11 DIAGNOSIS — M17.12 PRIMARY OSTEOARTHRITIS OF LEFT KNEE: ICD-10-CM

## 2024-01-11 RX ORDER — CELECOXIB 200 MG/1
200 CAPSULE ORAL DAILY PRN
Qty: 30 CAPSULE | Refills: 0 | Status: SHIPPED | OUTPATIENT
Start: 2024-01-11 | End: 2024-02-06

## 2024-01-11 NOTE — TELEPHONE ENCOUNTER
No care due was identified.  [facility] Embedded Care Due Messages. Reference number: [id number].   1/11/2024 1:26:54 PM CST

## 2024-01-16 RX ORDER — ATORVASTATIN CALCIUM 20 MG/1
20 TABLET, FILM COATED ORAL NIGHTLY
Qty: 90 TABLET | Refills: 2 | Status: SHIPPED | OUTPATIENT
Start: 2024-01-16

## 2024-01-16 NOTE — TELEPHONE ENCOUNTER
No care due was identified.  [facility] Embedded Care Due Messages. Reference number: [id number].   1/16/2024 5:38:35 PM CST

## 2024-01-17 NOTE — TELEPHONE ENCOUNTER
Refill Decision Note   Sal Rowley  is requesting a refill authorization.  Brief Assessment and Rationale for Refill:  Approve     Medication Therapy Plan:         Comments:     Note composed:7:47 PM 01/16/2024

## 2024-01-18 ENCOUNTER — PROCEDURE VISIT (OUTPATIENT)
Dept: NEUROLOGY | Facility: CLINIC | Age: 69
End: 2024-01-18
Payer: COMMERCIAL

## 2024-01-18 DIAGNOSIS — R20.0 RIGHT LEG NUMBNESS: ICD-10-CM

## 2024-01-18 DIAGNOSIS — M54.17 LUMBOSACRAL RADICULOPATHY: Primary | ICD-10-CM

## 2024-01-18 DIAGNOSIS — M43.16 SPONDYLOLISTHESIS OF LUMBAR REGION: ICD-10-CM

## 2024-01-18 DIAGNOSIS — M54.50 LUMBAR PAIN: ICD-10-CM

## 2024-01-18 PROCEDURE — 95910 NRV CNDJ TEST 7-8 STUDIES: CPT | Mod: S$GLB,,, | Performed by: PSYCHIATRY & NEUROLOGY

## 2024-01-18 PROCEDURE — 95886 MUSC TEST DONE W/N TEST COMP: CPT | Mod: S$GLB,,, | Performed by: PSYCHIATRY & NEUROLOGY
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
## 2024-01-19 DIAGNOSIS — N52.9 ERECTILE DYSFUNCTION, UNSPECIFIED ERECTILE DYSFUNCTION TYPE: ICD-10-CM

## 2024-01-22 ENCOUNTER — CLINICAL SUPPORT (OUTPATIENT)
Dept: REHABILITATION | Facility: HOSPITAL | Age: 69
End: 2024-01-22
Attending: PSYCHIATRY & NEUROLOGY
Payer: COMMERCIAL

## 2024-01-22 DIAGNOSIS — M54.50 LUMBAR PAIN: ICD-10-CM

## 2024-01-22 DIAGNOSIS — M54.17 LUMBOSACRAL RADICULOPATHY: ICD-10-CM

## 2024-01-22 PROCEDURE — 97530 THERAPEUTIC ACTIVITIES: CPT | Mod: PN

## 2024-01-22 PROCEDURE — 97161 PT EVAL LOW COMPLEX 20 MIN: CPT | Mod: PN

## 2024-01-22 RX ORDER — SILDENAFIL CITRATE 20 MG/1
TABLET ORAL
Qty: 30 TABLET | Refills: 5 | Status: SHIPPED | OUTPATIENT
Start: 2024-01-22
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
## 2024-01-23 NOTE — TELEPHONE ENCOUNTER
Printed script faxed to requested pharmacy at formerly [facility]. Fax confirmation received.

## 2024-02-06 DIAGNOSIS — M17.12 PRIMARY OSTEOARTHRITIS OF LEFT KNEE: ICD-10-CM

## 2024-02-06 DIAGNOSIS — M25.562 ACUTE PAIN OF LEFT KNEE: ICD-10-CM

## 2024-02-06 RX ORDER — CELECOXIB 200 MG/1
200 CAPSULE ORAL DAILY PRN
Qty: 30 CAPSULE | Refills: 0 | Status: SHIPPED | OUTPATIENT
Start: 2024-02-06 | End: 2024-03-28

## 2024-02-06 NOTE — TELEPHONE ENCOUNTER
No care due was identified.  [facility] Embedded Care Due Messages. Reference number: [id number].   2/06/2024 11:52:52 AM CST

## 2024-03-25 ENCOUNTER — OFFICE VISIT (OUTPATIENT)
Dept: PAIN MEDICINE | Facility: CLINIC | Age: 69
End: 2024-03-25
Payer: COMMERCIAL

## 2024-03-25 ENCOUNTER — HOSPITAL ENCOUNTER (OUTPATIENT)
Dept: RADIOLOGY | Facility: HOSPITAL | Age: 69
Discharge: HOME | End: 2024-03-25
Attending: ANESTHESIOLOGY
Payer: COMMERCIAL

## 2024-03-25 ENCOUNTER — TELEPHONE (OUTPATIENT)
Dept: PAIN MEDICINE | Facility: CLINIC | Age: 69
End: 2024-03-25
Payer: COMMERCIAL

## 2024-03-25 VITALS — BODY MASS INDEX: 35.65 KG/M2 | HEIGHT: 65 IN | WEIGHT: 214 LBS

## 2024-03-25 DIAGNOSIS — M54.17 LUMBOSACRAL RADICULOPATHY: Primary | ICD-10-CM

## 2024-03-25 DIAGNOSIS — M54.16 LUMBAR RADICULITIS: ICD-10-CM

## 2024-03-25 DIAGNOSIS — M51.36 LUMBAR DEGENERATIVE DISC DISEASE: ICD-10-CM

## 2024-03-25 DIAGNOSIS — M54.50 LUMBAR PAIN: ICD-10-CM

## 2024-03-25 PROCEDURE — 4010F ACE/ARB THERAPY RXD/TAKEN: CPT | Mod: CPTII,S$GLB,, | Performed by: ANESTHESIOLOGY

## 2024-03-25 PROCEDURE — 99999 PR PBB SHADOW E&M-EST. PATIENT-LVL III: CPT | Mod: PBBFAC,,, | Performed by: ANESTHESIOLOGY

## 2024-03-25 PROCEDURE — 1159F MED LIST DOCD IN RCRD: CPT | Mod: CPTII,S$GLB,, | Performed by: ANESTHESIOLOGY

## 2024-03-25 PROCEDURE — 3008F BODY MASS INDEX DOCD: CPT | Mod: CPTII,S$GLB,, | Performed by: ANESTHESIOLOGY

## 2024-03-25 PROCEDURE — 99204 OFFICE O/P NEW MOD 45 MIN: CPT | Mod: S$GLB,,, | Performed by: ANESTHESIOLOGY

## 2024-03-25 PROCEDURE — 72114 X-RAY EXAM L-S SPINE BENDING: CPT | Mod: TC

## 2024-03-25 PROCEDURE — 1160F RVW MEDS BY RX/DR IN RCRD: CPT | Mod: CPTII,S$GLB,, | Performed by: ANESTHESIOLOGY

## 2024-03-25 PROCEDURE — 72114 X-RAY EXAM L-S SPINE BENDING: CPT | Mod: 26,,, | Performed by: RADIOLOGY

## 2024-03-25 NOTE — TELEPHONE ENCOUNTER
----- Message from Josef Mccauley MD sent at 3/25/2024  8:50 AM CDT -----  Please schedule for Lumbar epidural steroid injection at L4-5  Josef Mccauley MD  Anesthesiologist  Interventional Pain Management  03/25/2024

## 2024-03-25 NOTE — PROGRESS NOTES
[facility] New Patient Evaluation    Sal oRwley  : 1955  Date: 3/25/2024     CHIEF COMPLAINT:  Low-back Pain  Referring Physician: Irvin Ward MD  Primary Care Physician: Justice Mccarty MD    HPI:  This is a 68 y.o. male with a chief complaint of Low-back Pain  . The patient has Past medical history/Past surgical history of left rotator cuff muscle tear, hypertension, hyperlipidemia, acid reflux    Patient was evaluated and referred by Neurology provider for lumbar radiculopathy.    He had EMG/nerve conduction study completed in 2024 that showed chronic and mild lumbosacral radiculopathy from L3-S1 bilaterally.    He started physical therapy in 2024 with mild relief  MRI lumbar spine updated in 2023 showed grade 1 retrolisthesis and anterolisthesis across the lumbar spine.  Has multilevel facet arthropathy with mild-to-moderate neural foramina narrowing at multiple levels worse at L4-L5  Does not take any pain medication  Never tried gabapentin or Lyrica before.    Diabetic: No    Anticogualtion drugs: None    Allergy To Iodine: No    Currently on Antibiotic: No    Current Description of Pain Symptoms:    History of Recent Fall or Trauma: No   Onset: Chronic, started 2021 after accident  Pain Location: lower back  Radiates/associated symptoms:  Anterior aspect of right thigh.   Pain is Getting worse over the last 3 months    The pain is described as aching, numbing, and stabbing.   Exacerbating factors: Standing.   Mitigating factors laying down.   Symptoms interfere with daily activity, sleeping, and work.   The patient feels like symptoms have been worsening.   Patient denies night fever/night sweats, urinary incontinence, bowel incontinence, significant weight loss, significant motor weakness, and loss of sensations.    Pain score:   Current: 3/10  Best: 1/10  Worst: 10/10    Current pain medications:  NA    Current Narcotics/Opioid /benzo  "Medications:  Opioids- None  Benzodiazepines: No    UDS:  NA    PDMP:  Reviewed and consistent with medication use as prescribed.     Previous Chronic Pain Treatment History:  Physical Therapy/HEP/Physician Lead Exercise Program:  Over the past 12 months, Patient has done  6+ sessions.  PT response: Not Helpful.   Dates of the PT sessions: 2/2024  Is patient participating in home exercise program (HEP): No.    Non-interventional Pain Therapy:  []Chiropractor.   []Acupuncture/Dry needle.  []TENS unit.  []Heat/ICE.  []Back Brace.    Medications previously tried:  NSAIDs: Ibuprofen (Advil/Motrin) and Celebrex (Celecoxib)  Topical Agent: No  TCA/SSRI/SNRI: None  Anti-convulsants: None  Muscle Relaxants: None  Opioids- Tramadol (Ultram).    Interventional Pain Procedures:  none    Previous spine/Relevant joint surgery:  none  Surgical History:   has a past surgical history that includes Knee arthroscopy; Tonsillectomy; Rotator cuff repair (Right); Carpal tunnel release (Left, 3/27/2023); Carpal tunnel release (Right, 4/10/2023); and Colonoscopy (N/A, 7/25/2023).  Medical History:   has a past medical history of Hypertension, Mixed hyperlipidemia, and Osteoarthritis.  Family History:  family history is not on file.  Allergies:  Patient has no known allergies.   Social History/SUBSTANCE ABUSE HISTORY:  Personal history of substance abuse: No   reports that he quit smoking about 34 years ago. His smoking use included cigarettes. He does not have any smokeless tobacco history on file. He reports current alcohol use. He reports that he does not use drugs.  LABS:  CBC  Lab Results   Component Value Date    WBC 7.13 09/29/2023    HGB 14.7 09/29/2023    HCT 43.2 09/29/2023     Coagulation Profile   Lab Results   Component Value Date     09/29/2023     No results found for: "PT", "PTT", "INR"  CMP:  BMP  Lab Results   Component Value Date     09/29/2023    K 4.7 09/29/2023     09/29/2023    CO2 25 09/29/2023    " "BUN 18 09/29/2023    CREATININE 1.0 09/29/2023    CALCIUM 9.4 09/29/2023    ANIONGAP 9 09/29/2023    EGFRNORACEVR >60 09/29/2023     Lab Results   Component Value Date     (H) 09/29/2023    AST 74 (H) 09/29/2023    ALKPHOS 55 09/29/2023    BILITOT 0.5 09/29/2023     HGBA1C:  Lab Results   Component Value Date    HGBA1C 5.8 (H) 09/29/2023   ROS:    Review of Systems   GENERAL:  No weight loss, malaise or fevers.  HEENT:   No recent changes in vision or hearing  NECK:  Negative for lumps, no difficulty with swallowing.  RESPIRATORY:  Negative for cough, wheezing or shortness of breath, patient denies any recent URI.  CARDIOVASCULAR:  Negative for chest pain, leg swelling or palpitations.  GI:  Negative for abdominal discomfort, blood in stools or black stools or change in bowel habits.  MUSCULOSKELETAL:  See HPI.  SKIN:  Negative for lesions, rash, and itching.  PSYCH:  No mood disorder or recent psychosocial stressors.   HEMATOLOGY/LYMPHOLOGY:  See the blood thinner sectioned in HPI.  NEURO:  See HPI  All other reviewed and negative other than HPI.    PHYSICAL EXAM:  VITALS: Ht 5' 5" (1.651 m)   Wt 97.1 kg (214 lb)   BMI 35.61 kg/m²   Body mass index is 35.61 kg/m².  GENERAL: Well appearing, in no acute distress, alert and oriented x3, answers questions appropriately.   PSYCH: Flat affect.  SKIN: Skin color, texture, turgor normal, no rashes or lesions.  HEAD/FACE:  Normocephalic, atraumatic. Cranial nerves grossly intact.  CV: Regular rate  PULM: No evidence of respiratory difficulty, symmetric chest rise.  GI:  Soft and non-Distended.  BACK/SIJ/HIP:  Lumbar Spine Exam:       Inspection: No erythema, bruising.       Palpation: (+) TTP of lumbar paraspinals bilaterally      ROM:  Limited in flexion, extension, lateral bending.       (mild +) Facet loading bilaterally      (+) Straight Leg Raise bilaterally      (-) DHAVAL bilaterally, Tenderness over the PSIS, Yeoman test  Hip Exam:      Inspection: No gross " deformity or apparent leg length discrepancy      Palpation:  No TTP to bilateral greater trochanteric bursas.       ROM:  No limitation or pain in internal rotation, external rotation b/l  Neurologic Exam:     Alert. Speech is fluent and appropriate.     Strength: 5/5 in BL hip flexion and knee extension,      Sensation:  Grossly intact to light touch in bilateral lower extremities     Tone: No abnormality appreciated in bilateral lower extremities  GAIT:  Normal gait    DIAGNOSTIC STUDIES AND MEDICAL RECORDS REVIEW:    EMG/NCS 2024  Chronic (more than 6 weeks old) and mild lumbosacral radiculopathy from L3-S1 bilaterally    MRI lumbar spine 12/2023     Marrow signal is within normal limits.  The conus lies at the L1 vertebral body level.  There is no evidence clumping of the nerve roots.     L1/2: There is grade 1 retrolisthesis of L1 with respect L2.  There is a circumferential disc bulge resulting in mild narrowing of bilateral neural foramina and mild narrowing of the central canal.     L2/3: There is grade 1 retrolisthesis of L2 with respect L3 and a circumferential disc bulge resulting in mild narrowing of bilateral neural foramina and mild narrowing of the central canal.     L3/4: There is a circumferential disc bulge resulting in moderate narrowing of bilateral neural foramina and moderate narrowing of the central canal.     L4/L5: There is grade 1 anterolisthesis of L4 with respect L5 and a circumferential disc bulge resulting in moderate narrowing of bilateral neural foramina and moderate central canal narrowing.  There is bilateral facet arthropathy.     L5/S1: There is grade 1 anterolisthesis of L5 with respect S1 and a circumferential disc protrusion with moderate narrowing of bilateral neural foramina.     The adjacent soft tissues are unremarkable.     Impression:     There are multilevel degenerative changes of the lumbar spine.     ASSESSMENT:  Sal Rowley is a 68 y.o. male with the  following diagnoses based on history, exam, and imagin. Lumbosacral radiculopathy  - X-Ray Lumbar Complete Including Flex And Ext; Future  - Ambulatory consult to Pain Clinic    2. Lumbar radiculitis  - X-Ray Lumbar Complete Including Flex And Ext; Future  - Ambulatory consult to Pain Clinic    3. Lumbar degenerative disc disease  - X-Ray Lumbar Complete Including Flex And Ext; Future  - Ambulatory consult to Pain Clinic     ---------------------------------------------------------------------  This is a pleasant 68 y.o. male patient with PMH  left rotator cuff muscle tear, hypertension, hyperlipidemia, acid reflux, presenting with low back pain stabbing and aching in nature in addition to anterior aspect right thigh numbness and tingling that has been worsening since the accident , patient had is completed multiple sessions of physical therapy in  without significant improvement in pain and functionality, had MRI lumbar spine that show multilevel anterior listhesis and retrolisthesis, facet arthropathy, moderate bilateral neural foramina narrowing and moderate central canal stenosis most specifically at L4-L5, he would like to proceed with the interventional procedure    Treatment Plan:    Diagnostics/Referrals:  X-ray lumbar spine .    Medications:    NSAIDs: None  Topical Agent: No  TCA/SSRI/SNRI: None  Anti-convulsants: None  Muscle Relaxants: None  Opioids: None    Interventional Therapy: Please schedule for Lumbar epidural steroid injection at L4-5.  Sedation:  None  Clearance to stop Blood thinner:  None    Regarding the above interventions, the patient has been educated regarding the risks (including bleeding, infection, increased pain, nerve damage, or allergic reaction), benefits, and alternatives. The patient states he understands and is eager to proceed.    Physical Rehabilitation:  Continuing physical therapy.    Patient Education: Counseled patient regarding the importance of activity  modification, I have stressed the importance of physical activity and a home exercise plan to help with pain and improve health.    Follow-up: RTC 4 weeks after.    May consider:  Lumbar medial branch block to address his lower back pain, gabapentin, lateral femoral cutaneous nerve block    I would like to thank Irvin Ward MD for the opportunity to assist in the care of this patient. We had a very nice visit and I look forward to continuing their care. Please let me know if I can be of further assistance.     Josef Mccauley MD  Anesthesiologist  Interventional Pain Medicine  03/25/2024    Disclaimer:  This note was prepared using voice recognition system and is likely to have sound alike errors that may have been overlooked even after proof reading.  Please call me with any questions.

## 2024-03-25 NOTE — H&P (VIEW-ONLY)
[facility] New Patient Evaluation    Sal Rowley  : 1955  Date: 3/25/2024     CHIEF COMPLAINT:  Low-back Pain  Referring Physician: Irvin Ward MD  Primary Care Physician: Justice Mccarty MD    HPI:  This is a 68 y.o. male with a chief complaint of Low-back Pain  . The patient has Past medical history/Past surgical history of left rotator cuff muscle tear, hypertension, hyperlipidemia, acid reflux    Patient was evaluated and referred by Neurology provider for lumbar radiculopathy.    He had EMG/nerve conduction study completed in 2024 that showed chronic and mild lumbosacral radiculopathy from L3-S1 bilaterally.    He started physical therapy in 2024 with mild relief  MRI lumbar spine updated in 2023 showed grade 1 retrolisthesis and anterolisthesis across the lumbar spine.  Has multilevel facet arthropathy with mild-to-moderate neural foramina narrowing at multiple levels worse at L4-L5  Does not take any pain medication  Never tried gabapentin or Lyrica before.    Diabetic: No    Anticogualtion drugs: None    Allergy To Iodine: No    Currently on Antibiotic: No    Current Description of Pain Symptoms:    History of Recent Fall or Trauma: No   Onset: Chronic, started 2021 after accident  Pain Location: lower back  Radiates/associated symptoms:  Anterior aspect of right thigh.   Pain is Getting worse over the last 3 months    The pain is described as aching, numbing, and stabbing.   Exacerbating factors: Standing.   Mitigating factors laying down.   Symptoms interfere with daily activity, sleeping, and work.   The patient feels like symptoms have been worsening.   Patient denies night fever/night sweats, urinary incontinence, bowel incontinence, significant weight loss, significant motor weakness, and loss of sensations.    Pain score:   Current: 3/10  Best: 1/10  Worst: 10/10    Current pain medications:  NA    Current Narcotics/Opioid /benzo  "Medications:  Opioids- None  Benzodiazepines: No    UDS:  NA    PDMP:  Reviewed and consistent with medication use as prescribed.     Previous Chronic Pain Treatment History:  Physical Therapy/HEP/Physician Lead Exercise Program:  Over the past 12 months, Patient has done  6+ sessions.  PT response: Not Helpful.   Dates of the PT sessions: 2/2024  Is patient participating in home exercise program (HEP): No.    Non-interventional Pain Therapy:  []Chiropractor.   []Acupuncture/Dry needle.  []TENS unit.  []Heat/ICE.  []Back Brace.    Medications previously tried:  NSAIDs: Ibuprofen (Advil/Motrin) and Celebrex (Celecoxib)  Topical Agent: No  TCA/SSRI/SNRI: None  Anti-convulsants: None  Muscle Relaxants: None  Opioids- Tramadol (Ultram).    Interventional Pain Procedures:  none    Previous spine/Relevant joint surgery:  none  Surgical History:   has a past surgical history that includes Knee arthroscopy; Tonsillectomy; Rotator cuff repair (Right); Carpal tunnel release (Left, 3/27/2023); Carpal tunnel release (Right, 4/10/2023); and Colonoscopy (N/A, 7/25/2023).  Medical History:   has a past medical history of Hypertension, Mixed hyperlipidemia, and Osteoarthritis.  Family History:  family history is not on file.  Allergies:  Patient has no known allergies.   Social History/SUBSTANCE ABUSE HISTORY:  Personal history of substance abuse: No   reports that he quit smoking about 34 years ago. His smoking use included cigarettes. He does not have any smokeless tobacco history on file. He reports current alcohol use. He reports that he does not use drugs.  LABS:  CBC  Lab Results   Component Value Date    WBC 7.13 09/29/2023    HGB 14.7 09/29/2023    HCT 43.2 09/29/2023     Coagulation Profile   Lab Results   Component Value Date     09/29/2023     No results found for: "PT", "PTT", "INR"  CMP:  BMP  Lab Results   Component Value Date     09/29/2023    K 4.7 09/29/2023     09/29/2023    CO2 25 09/29/2023    " "BUN 18 09/29/2023    CREATININE 1.0 09/29/2023    CALCIUM 9.4 09/29/2023    ANIONGAP 9 09/29/2023    EGFRNORACEVR >60 09/29/2023     Lab Results   Component Value Date     (H) 09/29/2023    AST 74 (H) 09/29/2023    ALKPHOS 55 09/29/2023    BILITOT 0.5 09/29/2023     HGBA1C:  Lab Results   Component Value Date    HGBA1C 5.8 (H) 09/29/2023   ROS:    Review of Systems   GENERAL:  No weight loss, malaise or fevers.  HEENT:   No recent changes in vision or hearing  NECK:  Negative for lumps, no difficulty with swallowing.  RESPIRATORY:  Negative for cough, wheezing or shortness of breath, patient denies any recent URI.  CARDIOVASCULAR:  Negative for chest pain, leg swelling or palpitations.  GI:  Negative for abdominal discomfort, blood in stools or black stools or change in bowel habits.  MUSCULOSKELETAL:  See HPI.  SKIN:  Negative for lesions, rash, and itching.  PSYCH:  No mood disorder or recent psychosocial stressors.   HEMATOLOGY/LYMPHOLOGY:  See the blood thinner sectioned in HPI.  NEURO:  See HPI  All other reviewed and negative other than HPI.    PHYSICAL EXAM:  VITALS: Ht 5' 5" (1.651 m)   Wt 97.1 kg (214 lb)   BMI 35.61 kg/m²   Body mass index is 35.61 kg/m².  GENERAL: Well appearing, in no acute distress, alert and oriented x3, answers questions appropriately.   PSYCH: Flat affect.  SKIN: Skin color, texture, turgor normal, no rashes or lesions.  HEAD/FACE:  Normocephalic, atraumatic. Cranial nerves grossly intact.  CV: Regular rate  PULM: No evidence of respiratory difficulty, symmetric chest rise.  GI:  Soft and non-Distended.  BACK/SIJ/HIP:  Lumbar Spine Exam:       Inspection: No erythema, bruising.       Palpation: (+) TTP of lumbar paraspinals bilaterally      ROM:  Limited in flexion, extension, lateral bending.       (mild +) Facet loading bilaterally      (+) Straight Leg Raise bilaterally      (-) DHAVAL bilaterally, Tenderness over the PSIS, Yeoman test  Hip Exam:      Inspection: No gross " deformity or apparent leg length discrepancy      Palpation:  No TTP to bilateral greater trochanteric bursas.       ROM:  No limitation or pain in internal rotation, external rotation b/l  Neurologic Exam:     Alert. Speech is fluent and appropriate.     Strength: 5/5 in BL hip flexion and knee extension,      Sensation:  Grossly intact to light touch in bilateral lower extremities     Tone: No abnormality appreciated in bilateral lower extremities  GAIT:  Normal gait    DIAGNOSTIC STUDIES AND MEDICAL RECORDS REVIEW:    EMG/NCS 2024  Chronic (more than 6 weeks old) and mild lumbosacral radiculopathy from L3-S1 bilaterally    MRI lumbar spine 12/2023     Marrow signal is within normal limits.  The conus lies at the L1 vertebral body level.  There is no evidence clumping of the nerve roots.     L1/2: There is grade 1 retrolisthesis of L1 with respect L2.  There is a circumferential disc bulge resulting in mild narrowing of bilateral neural foramina and mild narrowing of the central canal.     L2/3: There is grade 1 retrolisthesis of L2 with respect L3 and a circumferential disc bulge resulting in mild narrowing of bilateral neural foramina and mild narrowing of the central canal.     L3/4: There is a circumferential disc bulge resulting in moderate narrowing of bilateral neural foramina and moderate narrowing of the central canal.     L4/L5: There is grade 1 anterolisthesis of L4 with respect L5 and a circumferential disc bulge resulting in moderate narrowing of bilateral neural foramina and moderate central canal narrowing.  There is bilateral facet arthropathy.     L5/S1: There is grade 1 anterolisthesis of L5 with respect S1 and a circumferential disc protrusion with moderate narrowing of bilateral neural foramina.     The adjacent soft tissues are unremarkable.     Impression:     There are multilevel degenerative changes of the lumbar spine.     ASSESSMENT:  Sal Rowley is a 68 y.o. male with the  following diagnoses based on history, exam, and imagin. Lumbosacral radiculopathy  - X-Ray Lumbar Complete Including Flex And Ext; Future  - Ambulatory consult to Pain Clinic    2. Lumbar radiculitis  - X-Ray Lumbar Complete Including Flex And Ext; Future  - Ambulatory consult to Pain Clinic    3. Lumbar degenerative disc disease  - X-Ray Lumbar Complete Including Flex And Ext; Future  - Ambulatory consult to Pain Clinic     ---------------------------------------------------------------------  This is a pleasant 68 y.o. male patient with PMH  left rotator cuff muscle tear, hypertension, hyperlipidemia, acid reflux, presenting with low back pain stabbing and aching in nature in addition to anterior aspect right thigh numbness and tingling that has been worsening since the accident , patient had is completed multiple sessions of physical therapy in  without significant improvement in pain and functionality, had MRI lumbar spine that show multilevel anterior listhesis and retrolisthesis, facet arthropathy, moderate bilateral neural foramina narrowing and moderate central canal stenosis most specifically at L4-L5, he would like to proceed with the interventional procedure    Treatment Plan:    Diagnostics/Referrals:  X-ray lumbar spine .    Medications:    NSAIDs: None  Topical Agent: No  TCA/SSRI/SNRI: None  Anti-convulsants: None  Muscle Relaxants: None  Opioids: None    Interventional Therapy: Please schedule for Lumbar epidural steroid injection at L4-5.  Sedation:  None  Clearance to stop Blood thinner:  None    Regarding the above interventions, the patient has been educated regarding the risks (including bleeding, infection, increased pain, nerve damage, or allergic reaction), benefits, and alternatives. The patient states he understands and is eager to proceed.    Physical Rehabilitation:  Continuing physical therapy.    Patient Education: Counseled patient regarding the importance of activity  modification, I have stressed the importance of physical activity and a home exercise plan to help with pain and improve health.    Follow-up: RTC 4 weeks after.    May consider:  Lumbar medial branch block to address his lower back pain, gabapentin, lateral femoral cutaneous nerve block    I would like to thank Irvin Ward MD for the opportunity to assist in the care of this patient. We had a very nice visit and I look forward to continuing their care. Please let me know if I can be of further assistance.     Josef Mccauley MD  Anesthesiologist  Interventional Pain Medicine  03/25/2024    Disclaimer:  This note was prepared using voice recognition system and is likely to have sound alike errors that may have been overlooked even after proof reading.  Please call me with any questions.

## 2024-03-25 NOTE — PROGRESS NOTES
[facility] New Patient Evaluation    Sal Rowley  : 1955  Date: 3/25/2024     CHIEF COMPLAINT:  No chief complaint on file.    Referring Physician: Irvin Ward MD  Primary Care Physician: Justice Mccarty MD    HPI:  This is a 68 y.o. male with a chief complaint of No chief complaint on file.  . The patient has Past medical history/Past surgical history of left rotator cuff muscle tear    Patient was evaluated and referred by Neurology provider for lumbar radiculopathy.    He had EMG/nerve conduction study completed in 2024 that showed chronic and mild lumbosacral radiculopathy from L3-S1 bilaterally.    He started physical therapy in 2024  MRI lumbar spine updated in 2023 showed grade 1 retrolisthesis of L1-L2, L2-L3, L4-L5 and L5-S1.  Has multilevel facet arthropathy with mild-to-moderate neural foramina narrowing at multiple levels worse at L4-L5  Current pain management medication include Celebrex as needed for pain    Diabetic: {GAYes/No/NA:49128}    {Anticogualtion drugs:49650}    Allergy To Iodine: {GAYes/No/NA:48238}    Currently on Antibiotic: {GAYes/No/NA:12965}    Current Description of Pain Symptoms:    History of Recent Fall or Trauma: {GAYes/No/NA:28598}   Onset: Chronic, started ***  Pain Location: ***  Radiates/associated symptoms: ***.   Pain is Getting worse over the last *** months    The pain is described as {Desc; pain character:10286}.   Exacerbating factors: {Causes; Pain:59325}.   Mitigating factors ***.   Symptoms interfere with daily activity, sleeping, and ***.   The patient feels like symptoms have been {IUW:54136}.   Patient {Denies / Reports:90282} {RED FLAGS:50985}.    Pain score:   Current: {PAIN 0-10:60720}/10  Best: {PAIN 0-10:68348}/10  Worst: {PAIN 0-10:43983}/10    Current pain medications:      celecoxib (CELEBREX) 200 MG capsule, PRN  Current Narcotics/Opioid /benzo Medications:  Opioids-  "{GAopioid:61335}  Benzodiazepines: {GAYes/No/NA:70844}    UDS:  NA    PDMP:  Reviewed and consistent with medication use as prescribed.     Previous Chronic Pain Treatment History:  Physical Therapy/HEP/Physician Lead Exercise Program:  Over the past 12 months, Patient has done  *** sessions.  PT response: {PT response:39372} Helpful.   Dates of the PT sessions: ***, ***  Is patient participating in home exercise program (HEP): {GAYes/No/NA:16604}.    Non-interventional Pain Therapy:  []Chiropractor.   []Acupuncture/Dry needle.  []TENS unit.  []Heat/ICE.  []Back Brace.    Medications previously tried:  NSAIDs: {GANSIAD:54512}  Topical Agent: {GAYes/No/NA:27923}  TCA/SSRI/SNRI: {GATCA/SSRI/SNRI:81655}  Anti-convulsants: {GAAnticonvulsants:19018}  Muscle Relaxants: {GAmuscle Relaxant:14382}  Opioids- {GAopioid:69071}.    Interventional Pain Procedures:  ***    Previous spine/Relevant joint surgery:  ***  Surgical History:   has a past surgical history that includes Knee arthroscopy; Tonsillectomy; Rotator cuff repair (Right); Carpal tunnel release (Left, 3/27/2023); Carpal tunnel release (Right, 4/10/2023); and Colonoscopy (N/A, 7/25/2023).  Medical History:   has a past medical history of Hypertension, Mixed hyperlipidemia, and Osteoarthritis.  Family History:  family history is not on file.  Allergies:  Patient has no known allergies.   Social History/SUBSTANCE ABUSE HISTORY:  Personal history of substance abuse: No   reports that he quit smoking about 34 years ago. His smoking use included cigarettes. He does not have any smokeless tobacco history on file. He reports current alcohol use. He reports that he does not use drugs.  LABS:  CBC  Lab Results   Component Value Date    WBC 7.13 09/29/2023    HGB 14.7 09/29/2023    HCT 43.2 09/29/2023     Coagulation Profile   Lab Results   Component Value Date     09/29/2023     No results found for: "PT", "PTT", "INR"  CMP:  BMP  Lab Results   Component Value Date    "  09/29/2023    K 4.7 09/29/2023     09/29/2023    CO2 25 09/29/2023    BUN 18 09/29/2023    CREATININE 1.0 09/29/2023    CALCIUM 9.4 09/29/2023    ANIONGAP 9 09/29/2023    EGFRNORACEVR >60 09/29/2023     Lab Results   Component Value Date     (H) 09/29/2023    AST 74 (H) 09/29/2023    ALKPHOS 55 09/29/2023    BILITOT 0.5 09/29/2023     HGBA1C:  Lab Results   Component Value Date    HGBA1C 5.8 (H) 09/29/2023       ROS:    Review of Systems   GENERAL:  No weight loss, malaise or fevers.  HEENT:   No recent changes in vision or hearing  NECK:  Negative for lumps, no difficulty with swallowing.  RESPIRATORY:  Negative for cough, wheezing or shortness of breath, patient denies any recent URI.  CARDIOVASCULAR:  Negative for chest pain, leg swelling or palpitations.  GI:  Negative for abdominal discomfort, blood in stools or black stools or change in bowel habits.  MUSCULOSKELETAL:  See HPI.  SKIN:  Negative for lesions, rash, and itching.  PSYCH:  No mood disorder or recent psychosocial stressors.   HEMATOLOGY/LYMPHOLOGY:  See the blood thinner sectioned in HPI.  NEURO:  See HPI  All other reviewed and negative other than HPI.    PHYSICAL EXAM:  VITALS: There were no vitals taken for this visit.  There is no height or weight on file to calculate BMI.  GENERAL: Well appearing, in no acute distress, alert and oriented x3, answers questions appropriately.   PSYCH: Flat affect.  SKIN: Skin color, texture, turgor normal, no rashes or lesions.  HEAD/FACE:  Normocephalic, atraumatic. Cranial nerves grossly intact.  CV: Regular rate  PULM: No evidence of respiratory difficulty, symmetric chest rise.  GI:  Soft and non-Distended.  NECK: (***) pain to palpation over the cervical paraspinous muscles. Spurling: ***. (***) pain with neck flexion, extension, or lateral flexion, Muscle strength in RT UE ***/5 and Left UE ***/5, Hand  5/5 bilaterally   BACK/SIJ/HIP:  Lumbar Spine Exam:       Inspection: No  erythema, bruising.       Palpation: (***) TTP of lumbar paraspinals bilaterally      ROM:  Limited in flexion, extension, lateral bending.       (***) Facet loading bilaterally      (***) Straight Leg Raise bilaterally      (***) DHAVAL bilaterally, Tenderness over the PSIS, Yeoman test  Hip Exam:      Inspection: No gross deformity or apparent leg length discrepancy      Palpation:  No TTP to bilateral greater trochanteric bursas.       ROM:  No limitation or pain in internal rotation, external rotation b/l  Neurologic Exam:     Alert. Speech is fluent and appropriate.     Strength: ***/5 in *** hip flexion and knee extension, otherwise 5/5 throughout bilateral lower extremities     Sensation:  Grossly intact to light touch in bilateral lower extremities     Tone: No abnormality appreciated in bilateral lower extremities  EXTREMITIES: Peripheral joint ROM is full and pain free without obvious instability or laxity in all four extremities. No deformities, edema, or skin discoloration.     MUSCULOSKELETAL: Shoulder, hip, and knee provocative maneuvers are negative.  Bilateral upper and lower extremity strength is normal and symmetric.  No atrophy or tone abnormalities are noted.    NEURO: Bilateral upper and lower extremity coordination and muscle stretch reflexes are physiologic and symmetric.  Plantar response are downgoing. No clonus.  No loss of sensation is noted.    GAIT: ***    DIAGNOSTIC STUDIES AND MEDICAL RECORDS REVIEW:  EMG/NCS 2024  Chronic (more than 6 weeks old) and mild lumbosacral radiculopathy from L3-S1 bilaterally    MRI lumbar spine 12/2023     Marrow signal is within normal limits.  The conus lies at the L1 vertebral body level.  There is no evidence clumping of the nerve roots.     L1/2: There is grade 1 retrolisthesis of L1 with respect L2.  There is a circumferential disc bulge resulting in mild narrowing of bilateral neural foramina and mild narrowing of the central canal.     L2/3: There is  "grade 1 retrolisthesis of L2 with respect L3 and a circumferential disc bulge resulting in mild narrowing of bilateral neural foramina and mild narrowing of the central canal.     L3/4: There is a circumferential disc bulge resulting in moderate narrowing of bilateral neural foramina and moderate narrowing of the central canal.     L4/L5: There is grade 1 anterolisthesis of L4 with respect L5 and a circumferential disc bulge resulting in moderate narrowing of bilateral neural foramina and moderate central canal narrowing.  There is bilateral facet arthropathy.     L5/S1: There is grade 1 anterolisthesis of L5 with respect S1 and a circumferential disc protrusion with moderate narrowing of bilateral neural foramina.     The adjacent soft tissues are unremarkable.     Impression:     There are multilevel degenerative changes of the lumbar spine.     ASSESSMENT:  Sal Rowley is a 68 y.o. male with the following diagnoses based on history, exam, and imaging:  There are no diagnoses linked to this encounter.   ---------------------------------------------------------------------  This is a pleasant 68 y.o. male patient with PMH ***, presenting with***.     We discussed the underlying diagnoses and multiple treatment options including non-opioid medications, interventional procedures, physical therapy, home exercise, core muscle enhancement, and weight loss.  The risks and benefits of each treatment option were discussed and all questions were answered.      Treatment Plan:    Diagnostics/Referrals: *** .    Medications:    NSAIDs: {GANSIAD:31475}  Topical Agent: {GAYes/No/NA:91595}  TCA/SSRI/SNRI: {GATCA/SSRI/SNRI:78094}  Anti-convulsants: {GAAnticonvulsants:47751}  Muscle Relaxants: {GAmuscle Relaxant:17057}  Opioids: {GAopioid:35262::"None"}    Interventional Therapy: Please schedule for {Procedure:64959}.  Sedation: ***  Clearance to stop Blood thinner: {Anticogualtion drugs:33148}    Regarding the above " interventions, the patient has been educated regarding the risks (including bleeding, infection, increased pain, nerve damage, or allergic reaction), benefits, and alternatives. The patient states he understands and is eager to proceed.    Physical Rehabilitation: {blhtherapy:86604}.    Patient Education: Counseled patient regarding the importance of {:58898}, I have stressed the importance of physical activity and a home exercise plan to help with pain and improve health.    Follow-up: RTC ***.    May consider:     I would like to thank Ivrin Ward MD for the opportunity to assist in the care of this patient. We had a very nice visit and I look forward to continuing their care. Please let me know if I can be of further assistance.     Josef Mccauley MD  Anesthesiologist  Interventional Pain Medicine  03/25/2024    Disclaimer:  This note was prepared using voice recognition system and is likely to have sound alike errors that may have been overlooked even after proof reading.  Please call me with any questions.

## 2024-03-28 ENCOUNTER — OFFICE VISIT (OUTPATIENT)
Dept: FAMILY MEDICINE | Facility: CLINIC | Age: 69
End: 2024-03-28
Payer: COMMERCIAL

## 2024-03-28 VITALS
HEIGHT: 65 IN | BODY MASS INDEX: 35.76 KG/M2 | RESPIRATION RATE: 17 BRPM | OXYGEN SATURATION: 96 % | WEIGHT: 214.63 LBS | SYSTOLIC BLOOD PRESSURE: 122 MMHG | HEART RATE: 100 BPM | DIASTOLIC BLOOD PRESSURE: 76 MMHG

## 2024-03-28 DIAGNOSIS — R05.3 CHRONIC COUGH: ICD-10-CM

## 2024-03-28 DIAGNOSIS — E66.01 SEVERE OBESITY (BMI 35.0-39.9) WITH COMORBIDITY: ICD-10-CM

## 2024-03-28 DIAGNOSIS — M72.2 PLANTAR FASCIITIS: Primary | ICD-10-CM

## 2024-03-28 DIAGNOSIS — I10 ESSENTIAL HYPERTENSION: ICD-10-CM

## 2024-03-28 PROCEDURE — G2211 COMPLEX E/M VISIT ADD ON: HCPCS | Mod: S$GLB,,, | Performed by: FAMILY MEDICINE

## 2024-03-28 PROCEDURE — 3008F BODY MASS INDEX DOCD: CPT | Mod: CPTII,S$GLB,, | Performed by: FAMILY MEDICINE

## 2024-03-28 PROCEDURE — 4010F ACE/ARB THERAPY RXD/TAKEN: CPT | Mod: CPTII,S$GLB,, | Performed by: FAMILY MEDICINE

## 2024-03-28 PROCEDURE — 99214 OFFICE O/P EST MOD 30 MIN: CPT | Mod: S$GLB,,, | Performed by: FAMILY MEDICINE

## 2024-03-28 PROCEDURE — 3078F DIAST BP <80 MM HG: CPT | Mod: CPTII,S$GLB,, | Performed by: FAMILY MEDICINE

## 2024-03-28 PROCEDURE — 1125F AMNT PAIN NOTED PAIN PRSNT: CPT | Mod: CPTII,S$GLB,, | Performed by: FAMILY MEDICINE

## 2024-03-28 PROCEDURE — 1101F PT FALLS ASSESS-DOCD LE1/YR: CPT | Mod: CPTII,S$GLB,, | Performed by: FAMILY MEDICINE

## 2024-03-28 PROCEDURE — 3288F FALL RISK ASSESSMENT DOCD: CPT | Mod: CPTII,S$GLB,, | Performed by: FAMILY MEDICINE

## 2024-03-28 PROCEDURE — 3074F SYST BP LT 130 MM HG: CPT | Mod: CPTII,S$GLB,, | Performed by: FAMILY MEDICINE

## 2024-03-28 PROCEDURE — 99999 PR PBB SHADOW E&M-EST. PATIENT-LVL IV: CPT | Mod: PBBFAC,,, | Performed by: FAMILY MEDICINE

## 2024-03-28 PROCEDURE — 1159F MED LIST DOCD IN RCRD: CPT | Mod: CPTII,S$GLB,, | Performed by: FAMILY MEDICINE

## 2024-03-28 RX ORDER — OLMESARTAN MEDOXOMIL 40 MG/1
40 TABLET ORAL DAILY
Qty: 30 TABLET | Refills: 5 | Status: SHIPPED | OUTPATIENT
Start: 2024-03-28 | End: 2025-03-28

## 2024-03-28 RX ORDER — PANTOPRAZOLE SODIUM 40 MG/1
40 TABLET, DELAYED RELEASE ORAL DAILY
Qty: 30 TABLET | Refills: 5 | Status: SHIPPED | OUTPATIENT
Start: 2024-03-28

## 2024-03-28 NOTE — PROGRESS NOTES
Subjective:       Patient ID: Sal Rowley is a 68 y.o. male.    Chief Complaint: Follow-up (Pt here for 6 mth f/u. )    Pt is a 68 y.o. male who presents for evaluation and management of   Encounter Diagnoses   Name Primary?    Essential hypertension     Chronic cough     Plantar fasciitis Yes    Severe obesity (BMI 35.0-39.9) with comorbidity    .  Doing well on current meds. Denies any side effects. Prevention is up to date.  Review of Systems   Constitutional:  Negative for fever.   Respiratory:  Positive for cough. Negative for shortness of breath.    Cardiovascular:  Negative for chest pain.   Gastrointestinal:  Negative for anal bleeding and blood in stool.   Genitourinary:  Negative for dysuria.   Musculoskeletal:  Positive for back pain.   Neurological:  Negative for dizziness and light-headedness.       Objective:      Physical Exam  Constitutional:       Appearance: Normal appearance. He is well-developed. He is not ill-appearing.   HENT:      Head: Normocephalic and atraumatic.      Right Ear: External ear normal.      Left Ear: External ear normal.      Nose: Nose normal.      Mouth/Throat:      Mouth: Mucous membranes are moist.      Pharynx: No oropharyngeal exudate or posterior oropharyngeal erythema.   Eyes:      General: No scleral icterus.        Right eye: No discharge.         Left eye: No discharge.      Conjunctiva/sclera: Conjunctivae normal.      Pupils: Pupils are equal, round, and reactive to light.   Neck:      Thyroid: No thyromegaly.      Vascular: No JVD.      Trachea: No tracheal deviation.   Cardiovascular:      Rate and Rhythm: Normal rate and regular rhythm.      Heart sounds: Normal heart sounds. No murmur heard.  Pulmonary:      Effort: Pulmonary effort is normal. No respiratory distress.      Breath sounds: Normal breath sounds. No wheezing or rales.   Chest:      Chest wall: No tenderness.   Abdominal:      General: Bowel sounds are normal. There is no distension.       Palpations: Abdomen is soft. There is no mass.      Tenderness: There is no abdominal tenderness. There is no guarding or rebound.   Musculoskeletal:         General: Tenderness present. Normal range of motion.      Cervical back: Normal range of motion and neck supple.      Right lower leg: No edema.      Left lower leg: No edema.      Comments: TTP right heel at insertion of his PF to the heel    Lymphadenopathy:      Cervical: No cervical adenopathy.   Skin:     General: Skin is warm and dry.   Neurological:      Mental Status: He is alert and oriented to person, place, and time.      Cranial Nerves: No cranial nerve deficit.      Motor: No abnormal muscle tone.      Coordination: Coordination normal.      Deep Tendon Reflexes: Reflexes are normal and symmetric. Reflexes normal.   Psychiatric:         Behavior: Behavior normal.         Thought Content: Thought content normal.         Judgment: Judgment normal.         Assessment:       1. Plantar fasciitis    2. Essential hypertension    3. Chronic cough    4. Severe obesity (BMI 35.0-39.9) with comorbidity        Plan:   1. Plantar fasciitis  -     Ambulatory referral/consult to Physical/Occupational Therapy; Future; Expected date: 04/04/2024    2. Essential hypertension  -     olmesartan (BENICAR) 40 MG tablet; Take 1 tablet (40 mg total) by mouth once daily.  Dispense: 30 tablet; Refill: 5  -     Comprehensive Metabolic Panel; Future; Expected date: 03/28/2024    3. Chronic cough  Overview:  Remote h/o smoking. Quit almost 30 years ago   His previous PCP put him on Trellegy which has helped some with the cough. He called him COPD, per pt.   His PFT's from 1/2023 show no obstructive pattern. He has moderate restriction.   Trial of PPI 6/2023 has helped some        Orders:  -     pantoprazole (PROTONIX) 40 MG tablet; Take 1 tablet (40 mg total) by mouth once daily.  Dispense: 30 tablet; Refill: 5    4. Severe obesity (BMI 35.0-39.9) with comorbidity    The  patient is asked to make an attempt to improve diet and exercise patterns to aid in medical management of this problem.  Cut out white carbs: bread, rice, pasta, potatoes. Exercise/walk 5x/week for at least 30 minutes  .    No follow-ups on file.

## 2024-04-01 ENCOUNTER — LAB VISIT (OUTPATIENT)
Dept: LAB | Facility: HOSPITAL | Age: 69
End: 2024-04-01
Attending: FAMILY MEDICINE
Payer: COMMERCIAL

## 2024-04-01 DIAGNOSIS — R74.8 ELEVATED LIVER ENZYMES: ICD-10-CM

## 2024-04-01 DIAGNOSIS — I10 ESSENTIAL HYPERTENSION: ICD-10-CM

## 2024-04-01 LAB
ALBUMIN SERPL BCP-MCNC: 3.6 G/DL (ref 3.5–5.2)
ALP SERPL-CCNC: 54 U/L (ref 55–135)
ALT SERPL W/O P-5'-P-CCNC: 74 U/L (ref 10–44)
ANION GAP SERPL CALC-SCNC: 6 MMOL/L (ref 8–16)
AST SERPL-CCNC: 65 U/L (ref 10–40)
BILIRUB SERPL-MCNC: 0.4 MG/DL (ref 0.1–1)
BUN SERPL-MCNC: 21 MG/DL (ref 8–23)
CALCIUM SERPL-MCNC: 8.9 MG/DL (ref 8.7–10.5)
CHLORIDE SERPL-SCNC: 106 MMOL/L (ref 95–110)
CO2 SERPL-SCNC: 26 MMOL/L (ref 23–29)
CREAT SERPL-MCNC: 1.1 MG/DL (ref 0.5–1.4)
EST. GFR  (NO RACE VARIABLE): >60 ML/MIN/1.73 M^2
GLUCOSE SERPL-MCNC: 134 MG/DL (ref 70–110)
HBV CORE AB SERPL QL IA: NORMAL
HCV AB SERPL QL IA: NORMAL
POTASSIUM SERPL-SCNC: 4.6 MMOL/L (ref 3.5–5.1)
PROT SERPL-MCNC: 6.9 G/DL (ref 6–8.4)
SODIUM SERPL-SCNC: 138 MMOL/L (ref 136–145)

## 2024-04-01 PROCEDURE — 86803 HEPATITIS C AB TEST: CPT | Performed by: FAMILY MEDICINE

## 2024-04-01 PROCEDURE — 86704 HEP B CORE ANTIBODY TOTAL: CPT | Performed by: FAMILY MEDICINE

## 2024-04-01 PROCEDURE — 36415 COLL VENOUS BLD VENIPUNCTURE: CPT | Performed by: FAMILY MEDICINE

## 2024-04-01 PROCEDURE — 80053 COMPREHEN METABOLIC PANEL: CPT | Performed by: FAMILY MEDICINE

## 2024-04-12 ENCOUNTER — HOSPITAL ENCOUNTER (OUTPATIENT)
Facility: HOSPITAL | Age: 69
Discharge: HOME | End: 2024-04-12
Attending: ANESTHESIOLOGY | Admitting: ANESTHESIOLOGY
Payer: COMMERCIAL

## 2024-04-12 VITALS
RESPIRATION RATE: 20 BRPM | OXYGEN SATURATION: 98 % | HEART RATE: 60 BPM | TEMPERATURE: 97 F | DIASTOLIC BLOOD PRESSURE: 78 MMHG | SYSTOLIC BLOOD PRESSURE: 140 MMHG

## 2024-04-12 DIAGNOSIS — G89.29 CHRONIC PAIN: ICD-10-CM

## 2024-04-12 DIAGNOSIS — M54.17 LUMBOSACRAL RADICULOPATHY: Primary | ICD-10-CM

## 2024-04-12 PROCEDURE — 25500020 PHARM REV CODE 255: Performed by: ANESTHESIOLOGY

## 2024-04-12 PROCEDURE — 62323 NJX INTERLAMINAR LMBR/SAC: CPT | Mod: ,,, | Performed by: ANESTHESIOLOGY

## 2024-04-12 PROCEDURE — 62323 NJX INTERLAMINAR LMBR/SAC: CPT | Performed by: ANESTHESIOLOGY

## 2024-04-12 PROCEDURE — 63600175 PHARM REV CODE 636 W HCPCS: Performed by: ANESTHESIOLOGY

## 2024-04-12 PROCEDURE — 25000003 PHARM REV CODE 250: Performed by: ANESTHESIOLOGY

## 2024-04-12 RX ORDER — LIDOCAINE HYDROCHLORIDE 10 MG/ML
INJECTION, SOLUTION EPIDURAL; INFILTRATION; INTRACAUDAL; PERINEURAL
Status: DISCONTINUED | OUTPATIENT
Start: 2024-04-12 | End: 2024-04-12 | Stop reason: HOSPADM

## 2024-04-12 RX ORDER — DEXAMETHASONE SODIUM PHOSPHATE 10 MG/ML
INJECTION INTRAMUSCULAR; INTRAVENOUS
Status: DISCONTINUED | OUTPATIENT
Start: 2024-04-12 | End: 2024-04-12 | Stop reason: HOSPADM

## 2024-04-12 NOTE — OP NOTE
Lumbar Interlaminar Epidural Steroid Injection under Fluoroscopic Guidance    The procedure, risks, benefits, and options were discussed with the patient. There are no contraindications to the procedure. The patent expressed understanding and agreed to the procedure. Informed written consent was obtained prior to the start of the procedure and can be found in the patient's chart.    PATIENT NAME: Sal Rowley   MRN: [id number]     DATE OF PROCEDURE: 04/12/2024    PROCEDURE: Lumbar Interlaminar Epidural Steroid Injection L4/L5 under Fluoroscopic Guidance    PRE-OP DIAGNOSIS: Lumbosacral radiculopathy [M54.17] Lumbar radiculopathy [M54.16]    POST-OP DIAGNOSIS: Same    PHYSICIAN: Josef Mccauley MD      MEDICATIONS INJECTED: Preservative-free Decadron 10mg with 4cc of Lidocaine 1% MPF and preservative free normal saline    LOCAL ANESTHETIC INJECTED: Xylocaine 1%     SEDATION: None    ESTIMATED BLOOD LOSS: None    COMPLICATIONS: None    TECHNIQUE: Time-out was performed to identify the patient and procedure to be performed. With the patient laying in a prone position, the surgical area was prepped and draped in the usual sterile fashion using ChloraPrep and a fenestrated drape. The level was determined under fluoroscopy guidance. Skin anesthesia was achieved by injecting Lidocaine 2% over the injection site. The interlaminar space was then approached with a 20 gauge,  5 inch Tuohy needle that was introduced under fluoroscopic guidance in the AP, lateral and/or contralateral oblique imaging. Once the Ligamentum flavum was encountered loss of resistance to air was used to enter the epidural space. With positive loss of resistance and negative aspiration for CSF or Blood, contrast dye Omnipaque (300mg/mL) was injected to confirm placement and there was no vascular runoff. 4 mL of the medication mixture listed above was injected slowly. Displacement of the radio opaque contrast after injection of the medication confirmed that  the medication went into the area of the epidural space. The needles were removed and bleeding was nil. A sterile dressing was applied. No specimens collected. The patient tolerated the procedure well.       The patient was monitored after the procedure in the recovery area. They were given post-procedure and discharge instructions to follow at home. The patient was discharged in a stable condition.      Josef Mccauley MD

## 2024-04-12 NOTE — DISCHARGE SUMMARY
Discharge Note  Short Stay    Admit Date: 4/12/2024    Attending Physician: Josef Mccauley    Discharge Physician: Josef Mccauley    Discharge Date: 4/12/2024 11:53 AM    Procedure(s) (LRB):  LUMBAR EPIDURAL STEROID INJECTION (L4-5) (N/A)    Final Diagnosis: Lumbosacral radiculopathy [M54.17]    Disposition: Home or self care    Patient Instructions:   Current Discharge Medication List        CONTINUE these medications which have NOT CHANGED    Details   atorvastatin (LIPITOR) 20 MG tablet TAKE 1 TABLET BY MOUTH ONCE DAILY IN THE EVENING  Qty: 90 tablet, Refills: 2      ergocalciferol (ERGOCALCIFEROL) 50,000 unit Cap Take 50,000 Units by mouth every 7 days.      niacin 500 MG CpSR as needed.  Refills: 5      olmesartan (BENICAR) 40 MG tablet Take 1 tablet (40 mg total) by mouth once daily.  Qty: 30 tablet, Refills: 5    Comments: .  Associated Diagnoses: Essential hypertension      pantoprazole (PROTONIX) 40 MG tablet Take 1 tablet (40 mg total) by mouth once daily.  Qty: 30 tablet, Refills: 5    Associated Diagnoses: Chronic cough      sildenafil (REVATIO) 20 mg Tab sildenafil (pulmonary hypertension) 20 mg tablet   2-5 tablets 30 mins to 1 ht propr to desired activity  Qty: 30 tablet, Refills: 5    Associated Diagnoses: Erectile dysfunction, unspecified erectile dysfunction type             Discharge Diagnosis: Lumbosacral radiculopathy [M54.17]  Condition on Discharge: Stable with no complications to procedure   Diet on Discharge: Same as before.  Activity: as per instruction sheet.  Discharge to: Home with a responsible adult.  Follow up: 2-4 weeks       Please call my office or pager at [phone] if experienced any weakness or loss of sensation, fever > 101.5, pain uncontrolled with oral medications, persistent nausea/vomiting/or diarrhea, redness or drainage from the incisions, or any other worrisome concerns. If physician on call was not reached or could not communicate with our office for any reason please  go to the nearest emergency department.     Josef Mccauley  04/12/2024

## 2024-04-12 NOTE — DISCHARGE INSTRUCTIONS
DIET: You may resume your normal diet today.    BATHING: You may resume your normal bathing.          You may shower, no hot water directly on site for 24 hours.    DRESSING: You may remove your bandage today.    ACTIVITY LEVEL: You may resume your normal activities 24 hours after your  procedure.        MEDICATION: You may resume your normal medications today.    You will receive instructions for any pain prescriptions. Pain medications should be taken only as directed.    SPECIAL INSTRUCTIONS: No heat to the injection site for 24 hours including: bath or shower, heating pad, moist heat, hot tubs.    Use ice pack to injection site for any pain or discomfort. Apply ice pack to 20 minutes then remove for 20 minutes before re-applying to site.    WHEN TO CALL DOCTOR: Redness or swelling around injection site    Fever of 101F    Drainage (pus) from the injection site    For any continuous bleeding (some dried blood over the incision is normal).    FOLLOW UP: Follow up phone call will be made by office.    FOR EMERGENCIES: If any unusual problems or difficulties occur during clinic hours, call [phone] or [phone].

## 2024-04-22 ENCOUNTER — CLINICAL SUPPORT (OUTPATIENT)
Dept: REHABILITATION | Facility: HOSPITAL | Age: 69
End: 2024-04-22
Attending: FAMILY MEDICINE
Payer: COMMERCIAL

## 2024-04-22 DIAGNOSIS — M72.2 PLANTAR FASCIITIS: ICD-10-CM

## 2024-04-22 PROCEDURE — 97140 MANUAL THERAPY 1/> REGIONS: CPT | Mod: PN

## 2024-04-22 PROCEDURE — 97161 PT EVAL LOW COMPLEX 20 MIN: CPT | Mod: PN

## 2024-04-22 NOTE — PLAN OF CARE
[facility]   Physical Therapy Initial Evaluation     Date: 4/22/2024   Name: Sal Rowley  Clinic Number: [id number]    Therapy Diagnosis:   Encounter Diagnosis   Name Primary?    Plantar fasciitis      Physician: Justice Mccarty MD    Physician Orders: PT Eval and Treat   Medical Diagnosis from Referral:   Diagnosis   M72.2 (ICD-10-CM) - Plantar fasciitis   Evaluation Date: 4/22/2024  Authorization Period Expiration: 3/28/2025  Plan of Care Expiration: 5/20/2024  Progress Note Due: 5/6/2024  Visit # / Visits authorized: 1/ 1   FOTO: 1/3    Precautions: Standard     Time In: 0800  Time Out: 0840  Total Appointment Time (timed & untimed codes): 40 minutes      SUBJECTIVE     Date of onset: right foot pain started a few months ago    History of current condition - Sal reports: that he has had foot pain for the past few months. He reports multiple instances of stubbing his toe or foot. The pain feels like his ankle is sprained. Patient reports that he had an injection in his foot which helped with his pain but only temporarily. He does wear an orthotic in his shoe.     Falls: about one year ago    Imaging, bone scan films:     Prior Therapy: P.T. for back  Social History:  lives with their spouse  Occupation:   Prior Level of Function: Independent without difficulty  Current Level of Function: Independent with difficulty and pain    Pain:  Current 0/10, worst 5/10, best 0/10   Location: right feet    Description: Sharp and Shooting  Aggravating Factors: movement  Easing Factors:  injection    Patients goals: Patient to return to prior level of function without pain.     Medical History:   Past Medical History:   Diagnosis Date    Hypertension     Mixed hyperlipidemia     Osteoarthritis        Surgical History:   Sal Rowley  has a past surgical history that includes Knee arthroscopy; Tonsillectomy; Rotator cuff repair (Right); Carpal tunnel release  (Left, 3/27/2023); Carpal tunnel release (Right, 4/10/2023); Colonoscopy (N/A, 7/25/2023); and Epidural steroid injection into lumbar spine (N/A, 4/12/2024).    Medications:   Sal has a current medication list which includes the following prescription(s): atorvastatin, ergocalciferol, niacin, olmesartan, pantoprazole, and sildenafil, and the following Facility-Administered Medications: fentanyl, mupirocin, ondansetron, and sodium chloride 0.9%.    Allergies:   Review of patient's allergies indicates:  No Known Allergies     OBJECTIVE     ACTIVE RANGE OF MOTION    LEFT RIGHT   Dorsiflexion (gastroc) WNL WNL   Plantarflexion WNL WNL   Inversion WNL WNL   Eversion WNL WNL     LOWER EXTREMITY STRENGTH    Left Right     Ankle Dorsiflexion 4/5 4-/5   Ankle Plantarflexion (single heel raise) 4 2   Ankle Inversion 4/5 4-/5   Ankle Eversion 4/5 4-/5       DERMATOMES: Sensation: Light Touch: Intact  MYOTOMES: WNL    SPECIAL TESTS    LEFT RIGHT   Talar Tilt/Inversion Stress Tests (CFL)  +     PALPATION:  Patient reports primary pain region along *    GAIT: Sal ambulates with no assistive device with independently.     GAIT DEVIATIONS: Sal displays antalgic gait    Squat Mechanics: Bilateral knee valgus, anterior tibial translation, decreased depth of squat    Limitation/Restriction for FOTO Foot Survey    Therapist reviewed FOTO scores for Sal Rowley on 4/22/2024.   FOTO documents entered into EPIC - see Media section.    Limitation Score: 51%       TREATMENT     Total Treatment time (time-based codes) separate from Evaluation: 25 minutes      Sal received the treatments listed below:      therapeutic exercises to develop strength and flexibility for 0 minutes including:      manual therapy techniques: Joint mobilizations were applied to the: right foot for 15 minutes, including:  -FDN 1- 40 mm medial calcaneal tubercle; 2-40 right gastroc    neuromuscular re-education activities to improve:  for  minutes. The  following activities were included:      therapeutic activities to improve functional performance for   minutes, including:    hot pack for 0 minutes to .    cold pack for  minutes to .      PATIENT EDUCATION AND HOME EXERCISES     Education provided:   - Patient to perform gastroc stretches daily  -Aftercare following FDN and expectations of possible soreness where dry needling was performed  - Pt/family was provided educational information, including: role of PT, goals for PT, scheduling - pt verbalized understanding. Discussed insurance limitations with pt.     Written Home Exercises Provided: yes. Exercises were reviewed and Sal was able to demonstrate them prior to the end of the session.  Sal demonstrated good  understanding of the education provided. See EMR under Patient Instructions for exercises provided during therapy sessions.    ASSESSMENT     Sal is a 68 y.o. male referred to outpatient Physical Therapy with a medical diagnosis of   Diagnosis   M72.2 (ICD-10-CM) - Plantar fasciitis   . Patient presents with decreased ankle strength, increased point tenderness and decreased flexibility in right ankle. Patient would benefit from skilled physical therapy to address the above mentioned deficits.    Medical necessity is demonstrated by the following IMPAIRMENTS/PROBLEMS:  -Decreased function (LEFS)  -Decreased pain free ankle AROM ()  -Decreased ankle stability ()  -Decreased hip stability ()  -Decreased participation in regular exercise routine  -Increased pain (NPS)  -(+) TTP    Intervention strategies designed to address these impairments will be instrumental in achieving the stated functional goals, including her ability to safely perform mobility tasks. Based on the examination, the patient's rehabilitation potential to achieve functional goals is good.    Patient prognosis is Good.   Patient will benefit from skilled outpatient Physical Therapy to address the deficits stated above and in the chart  below, provide patient /family education, and to maximize patientt's level of independence.     Plan of care discussed with patient: Yes  Patient's spiritual, cultural and educational needs considered and patient is agreeable to the plan of care and goals as stated below:     Anticipated Barriers for therapy: none    Medical Necessity is demonstrated by the following  History  Co-morbidities and personal factors that may impact the plan of care Co-morbidities:   HTN    Personal Factors:   no deficits     low   Examination  Body Structures and Functions, activity limitations and participation restrictions that may impact the plan of care Body Regions:   lower extremities    Body Systems:    gross symmetry  ROM  strength           low   Clinical Presentation stable and uncomplicated low   Decision Making/ Complexity Score: low     Goals:  Short Term Goals: 2 weeks   1. Patient demonstrates independence with HEP.   2. Patient with min to nil point tenderness in plantar fascia.     Long Term Goals: 4 weeks   1. Patient demonstrates increased flexibility in right gastroc to improve tolerance to functional activities.   2. Patient demonstrates increased strength BLE's to 4/5 or greater to improve tolerance to functional activities.   3. Patient demonstrates improved overall function per LEFS to 38% or less.     PLAN   Plan of care Certification: 4/22/2024 to 5/20/2024.    Outpatient Physical Therapy 1 times weekly for 4 weeks to include the following interventions: Electrical Stimulation  , Manual Therapy, Moist Heat/ Ice, Neuromuscular Re-ed, Patient Education, Therapeutic Activities, and Therapeutic Exercise.     Rekha Carranza, PT    Pt may be seen by PTA as part of the rehabilitation team.     Therapist: Rekha Carranza PT  4/22/2024    I CERTIFY THE NEED FOR THESE SERVICES FURNISHED UNDER THIS PLAN OF TREATMENT AND WHILE UNDER MY CARE   Physician's comments:     Physician's Signature:  ___________________________________________________

## 2024-07-29 ENCOUNTER — OFFICE VISIT (OUTPATIENT)
Dept: NEUROLOGY | Facility: CLINIC | Age: 69
End: 2024-07-29
Payer: COMMERCIAL

## 2024-07-29 VITALS
SYSTOLIC BLOOD PRESSURE: 118 MMHG | HEART RATE: 72 BPM | RESPIRATION RATE: 14 BRPM | BODY MASS INDEX: 35.45 KG/M2 | HEIGHT: 65 IN | DIASTOLIC BLOOD PRESSURE: 88 MMHG | WEIGHT: 212.75 LBS

## 2024-07-29 DIAGNOSIS — M54.17 LUMBOSACRAL RADICULOPATHY: Primary | ICD-10-CM

## 2024-07-29 DIAGNOSIS — M54.9 DORSALGIA, UNSPECIFIED: ICD-10-CM

## 2024-07-29 DIAGNOSIS — R20.0 RIGHT LEG NUMBNESS: ICD-10-CM

## 2024-07-29 PROCEDURE — 3074F SYST BP LT 130 MM HG: CPT | Mod: CPTII,S$GLB,, | Performed by: PSYCHIATRY & NEUROLOGY

## 2024-07-29 PROCEDURE — 99999 PR PBB SHADOW E&M-EST. PATIENT-LVL III: CPT | Mod: PBBFAC,,, | Performed by: PSYCHIATRY & NEUROLOGY

## 2024-07-29 PROCEDURE — 1159F MED LIST DOCD IN RCRD: CPT | Mod: CPTII,S$GLB,, | Performed by: PSYCHIATRY & NEUROLOGY

## 2024-07-29 PROCEDURE — 99214 OFFICE O/P EST MOD 30 MIN: CPT | Mod: S$GLB,,, | Performed by: PSYCHIATRY & NEUROLOGY

## 2024-07-29 PROCEDURE — 1160F RVW MEDS BY RX/DR IN RCRD: CPT | Mod: CPTII,S$GLB,, | Performed by: PSYCHIATRY & NEUROLOGY

## 2024-07-29 PROCEDURE — 1126F AMNT PAIN NOTED NONE PRSNT: CPT | Mod: CPTII,S$GLB,, | Performed by: PSYCHIATRY & NEUROLOGY

## 2024-07-29 PROCEDURE — 4010F ACE/ARB THERAPY RXD/TAKEN: CPT | Mod: CPTII,S$GLB,, | Performed by: PSYCHIATRY & NEUROLOGY

## 2024-07-29 PROCEDURE — 3008F BODY MASS INDEX DOCD: CPT | Mod: CPTII,S$GLB,, | Performed by: PSYCHIATRY & NEUROLOGY

## 2024-07-29 PROCEDURE — 3079F DIAST BP 80-89 MM HG: CPT | Mod: CPTII,S$GLB,, | Performed by: PSYCHIATRY & NEUROLOGY
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
## 2024-07-30 ENCOUNTER — TELEPHONE (OUTPATIENT)
Dept: PAIN MEDICINE | Facility: CLINIC | Age: 69
End: 2024-07-30

## 2024-07-30 ENCOUNTER — OFFICE VISIT (OUTPATIENT)
Dept: PAIN MEDICINE | Facility: CLINIC | Age: 69
End: 2024-07-30
Payer: COMMERCIAL

## 2024-07-30 VITALS — WEIGHT: 214.81 LBS | BODY MASS INDEX: 35.74 KG/M2

## 2024-07-30 DIAGNOSIS — M54.17 LUMBOSACRAL RADICULOPATHY: Primary | ICD-10-CM

## 2024-07-30 DIAGNOSIS — M47.816 LUMBAR FACET ARTHROPATHY: ICD-10-CM

## 2024-07-30 DIAGNOSIS — M51.36 LUMBAR DEGENERATIVE DISC DISEASE: ICD-10-CM

## 2024-07-30 DIAGNOSIS — M54.16 LUMBAR RADICULITIS: Primary | ICD-10-CM

## 2024-07-30 PROCEDURE — 99214 OFFICE O/P EST MOD 30 MIN: CPT | Mod: S$GLB,,, | Performed by: ANESTHESIOLOGY

## 2024-07-30 PROCEDURE — 3288F FALL RISK ASSESSMENT DOCD: CPT | Mod: CPTII,S$GLB,, | Performed by: ANESTHESIOLOGY

## 2024-07-30 PROCEDURE — 4010F ACE/ARB THERAPY RXD/TAKEN: CPT | Mod: CPTII,S$GLB,, | Performed by: ANESTHESIOLOGY

## 2024-07-30 PROCEDURE — 3008F BODY MASS INDEX DOCD: CPT | Mod: CPTII,S$GLB,, | Performed by: ANESTHESIOLOGY

## 2024-07-30 PROCEDURE — 1160F RVW MEDS BY RX/DR IN RCRD: CPT | Mod: CPTII,S$GLB,, | Performed by: ANESTHESIOLOGY

## 2024-07-30 PROCEDURE — 99999 PR PBB SHADOW E&M-EST. PATIENT-LVL II: CPT | Mod: PBBFAC,,, | Performed by: ANESTHESIOLOGY

## 2024-07-30 PROCEDURE — 1101F PT FALLS ASSESS-DOCD LE1/YR: CPT | Mod: CPTII,S$GLB,, | Performed by: ANESTHESIOLOGY

## 2024-07-30 PROCEDURE — 1159F MED LIST DOCD IN RCRD: CPT | Mod: CPTII,S$GLB,, | Performed by: ANESTHESIOLOGY
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
## 2024-07-30 NOTE — PROGRESS NOTES
[facility] EST Patient Evaluation    Sal Rowley  : 1955  Date: 2024     CHIEF COMPLAINT:  Follow-up  Referring Physician: No ref. provider found  Primary Care Physician: Justice Mccarty MD    HPI:  This is a 69 y.o. male with a chief complaint of Follow-up  . The patient has Past medical history/Past surgical history of left rotator cuff muscle tear, hypertension, hyperlipidemia, acid reflux    Patient was evaluated and referred by Neurology provider for lumbar radiculopathy.    He had EMG/nerve conduction study completed in 2024 that showed chronic and mild lumbosacral radiculopathy from L3-S1 bilaterally.    He started physical therapy in 2024 with mild relief  MRI lumbar spine updated in 2023 showed grade 1 retrolisthesis and anterolisthesis across the lumbar spine.  Has multilevel facet arthropathy with mild-to-moderate neural foramina narrowing at multiple levels worse at L4-L5  Does not take any pain medication  Never tried gabapentin or Lyrica before.    Interval History 2024:  Sal Rowley is here for follow up visit after lumbar epidural steroid injection at L4-5 in , he reported 50% improvement in pain and functionality after injection for back pain symptoms however he still has numbness in the right leg  Was seen by Dr. Ward 2024,  for numbness and burning in right LE.  Current pain score: 0/10 Spine       Diabetic: No    Anticogualtion drugs: None    Allergy To Iodine: No    Currently on Antibiotic: No    Current Description of Pain Symptoms:    History of Recent Fall or Trauma: No   Onset: Chronic, started 2021 after accident  Pain Location: lower back  Radiates/associated symptoms:  Anterior aspect of right thigh.   Pain is Getting worse over the last 3 months    The pain is described as aching, numbing, and stabbing.   Exacerbating factors: Standing.   Mitigating factors laying down.   Symptoms  interfere with daily activity, sleeping, and work.   The patient feels like symptoms have been slightly improving  Patient denies night fever/night sweats, urinary incontinence, bowel incontinence, significant weight loss, significant motor weakness, and loss of sensations.    Pain score:   Best: 1/10  Worst: 10/10    Current pain medications:  NA    Current Narcotics/Opioid /benzo Medications:  Opioids- None  Benzodiazepines: No    UDS:  NA    PDMP:  Reviewed and consistent with medication use as prescribed.     Previous Chronic Pain Treatment History:  Physical Therapy/HEP/Physician Lead Exercise Program:  Over the past 12 months, Patient has done  6+ sessions.  PT response: Not Helpful.   Dates of the PT sessions: 2/2024  Is patient participating in home exercise program (HEP): No.    Non-interventional Pain Therapy:  []Chiropractor.   []Acupuncture/Dry needle.  []TENS unit.  []Heat/ICE.  []Back Brace.    Medications previously tried:  NSAIDs: Ibuprofen (Advil/Motrin) and Celebrex (Celecoxib)  Topical Agent: No  TCA/SSRI/SNRI: None  Anti-convulsants: None  Muscle Relaxants: None  Opioids- Tramadol (Ultram).    Interventional Pain Procedures:  04/2024:  lumbar epidural steroid injection at L4-5, 50% improvement in pain and functionality    Previous spine/Relevant joint surgery:  none  Surgical History:   has a past surgical history that includes Knee arthroscopy; Tonsillectomy; Rotator cuff repair (Right); Carpal tunnel release (Left, 3/27/2023); Carpal tunnel release (Right, 4/10/2023); Colonoscopy (N/A, 7/25/2023); and Epidural steroid injection into lumbar spine (N/A, 4/12/2024).  Medical History:   has a past medical history of Hypertension, Mixed hyperlipidemia, and Osteoarthritis.  Family History:  family history is not on file.  Allergies:  Patient has no known allergies.   Social History/SUBSTANCE ABUSE HISTORY:  Personal history of substance abuse: No   reports that he quit smoking about 34 years ago. His  "smoking use included cigarettes. He does not have any smokeless tobacco history on file. He reports current alcohol use. He reports that he does not use drugs.  LABS:  CBC  Lab Results   Component Value Date    WBC 7.13 09/29/2023    HGB 14.7 09/29/2023    HCT 43.2 09/29/2023     Coagulation Profile   Lab Results   Component Value Date     09/29/2023     No results found for: "PT", "PTT", "INR"  CMP:  BMP  Lab Results   Component Value Date     04/01/2024    K 4.6 04/01/2024     04/01/2024    CO2 26 04/01/2024    BUN 21 04/01/2024    CREATININE 1.1 04/01/2024    CALCIUM 8.9 04/01/2024    ANIONGAP 6 (L) 04/01/2024    EGFRNORACEVR >60 04/01/2024     Lab Results   Component Value Date    ALT 74 (H) 04/01/2024    AST 65 (H) 04/01/2024    ALKPHOS 54 (L) 04/01/2024    BILITOT 0.4 04/01/2024     HGBA1C:  Lab Results   Component Value Date    HGBA1C 5.8 (H) 09/29/2023   ROS:    Review of Systems   GENERAL:  No weight loss, malaise or fevers.  HEENT:   No recent changes in vision or hearing  NECK:  Negative for lumps, no difficulty with swallowing.  RESPIRATORY:  Negative for cough, wheezing or shortness of breath, patient denies any recent URI.  CARDIOVASCULAR:  Negative for chest pain, leg swelling or palpitations.  GI:  Negative for abdominal discomfort, blood in stools or black stools or change in bowel habits.  MUSCULOSKELETAL:  See HPI.  SKIN:  Negative for lesions, rash, and itching.  PSYCH:  No mood disorder or recent psychosocial stressors.   HEMATOLOGY/LYMPHOLOGY:  See the blood thinner sectioned in HPI.  NEURO:  See HPI  All other reviewed and negative other than HPI.    PHYSICAL EXAM:  VITALS: Wt 97.4 kg (214 lb 12.8 oz)   BMI 35.74 kg/m²   Body mass index is 35.74 kg/m².  GENERAL: Well appearing, in no acute distress, alert and oriented x3, answers questions appropriately.   PSYCH: Flat affect.  SKIN: Skin color, texture, turgor normal, no rashes or lesions.  HEAD/FACE:  Normocephalic, " atraumatic. Cranial nerves grossly intact.  CV: Regular rate  PULM: No evidence of respiratory difficulty, symmetric chest rise.  GI:  Soft and non-Distended.  BACK/SIJ/HIP:  Lumbar Spine Exam:       Inspection: No erythema, bruising.       Palpation: (+) TTP of lumbar paraspinals bilaterally      ROM:  Limited in flexion, extension, lateral bending.       (mild +) Facet loading bilaterally      (+) Straight Leg Raise bilaterally      (-) DHAVAL bilaterally, Tenderness over the PSIS, Yeoman test  Hip Exam:      Inspection: No gross deformity or apparent leg length discrepancy      Palpation:  No TTP to bilateral greater trochanteric bursas.       ROM:  No limitation or pain in internal rotation, external rotation b/l  Neurologic Exam:     Alert. Speech is fluent and appropriate.     Strength: 5/5 in BL hip flexion and knee extension,      Sensation:  Grossly intact to light touch in bilateral lower extremities     Tone: No abnormality appreciated in bilateral lower extremities  GAIT:  Normal gait    DIAGNOSTIC STUDIES AND MEDICAL RECORDS REVIEW:    EMG/NCS 2024  Chronic (more than 6 weeks old) and mild lumbosacral radiculopathy from L3-S1 bilaterally    MRI lumbar spine 12/2023   Marrow signal is within normal limits.  The conus lies at the L1 vertebral body level.  There is no evidence clumping of the nerve roots.     L1/2: There is grade 1 retrolisthesis of L1 with respect L2.  There is a circumferential disc bulge resulting in mild narrowing of bilateral neural foramina and mild narrowing of the central canal.     L2/3: There is grade 1 retrolisthesis of L2 with respect L3 and a circumferential disc bulge resulting in mild narrowing of bilateral neural foramina and mild narrowing of the central canal.     L3/4: There is a circumferential disc bulge resulting in moderate narrowing of bilateral neural foramina and moderate narrowing of the central canal.     L4/L5: There is grade 1 anterolisthesis of L4 with respect  L5 and a circumferential disc bulge resulting in moderate narrowing of bilateral neural foramina and moderate central canal narrowing.  There is bilateral facet arthropathy.     L5/S1: There is grade 1 anterolisthesis of L5 with respect S1 and a circumferential disc protrusion with moderate narrowing of bilateral neural foramina.     X-ray lumbar spine flexion-extension 03/2024 showed There is retrolisthesis of T12 on L1, L1 on L2, L2 on L3 and L3 on L4 with anterolisthesis of L4 on L5 and L5 on S1. The anterolisthesis at L4-5 and L5-S1 appears to worsen on flexion imaging and improves on extension imaging concerning for ligamentous instability. There is also slight worsening of retrolisthesis in the upper lumbar spine on extension imaging concerning for ligamentous instability.      ASSESSMENT:  Sal Rowley is a 69 y.o. male with the following diagnoses based on history, exam, and imaging:  There are no diagnoses linked to this encounter.     ---------------------------------------------------------------------  This is a pleasant 69 y.o. male patient with PMH  left rotator cuff muscle tear, hypertension, hyperlipidemia, acid reflux, presenting with low back pain stabbing and aching in nature in addition to anterior aspect right thigh numbness and tingling that has been worsening since the accident 2021, patient had is completed multiple sessions of physical therapy in 2023 without significant improvement in pain and functionality, had MRI lumbar spine that show multilevel anteriorlisthesis and retrolisthesis, facet arthropathy, moderate bilateral neural foramina narrowing and moderate central canal stenosis most specifically at L4-L5, he had lumbar epidural steroid injection at L4-L5 with a  moderate improvement 50% in his low back pain however his numbness in his right lower extremity is still bothering him.    Treatment Plan:    Diagnostics/Referrals:  HEP    Medications:    NSAIDs: None  Topical Agent:  No  TCA/SSRI/SNRI: None  Anti-convulsants: None  Muscle Relaxants: None  Opioids: None    Interventional Therapy: Please schedule for Please schedule for  Right Transforaminal epidural steroid injection at L4 and L5 .  Sedation:  IV sedation  Clearance to stop Blood thinner:  None    Regarding the above interventions, the patient has been educated regarding the risks (including bleeding, infection, increased pain, nerve damage, or allergic reaction), benefits, and alternatives. The patient states he understands and is eager to proceed.    Physical Rehabilitation:  Continuing physical therapy.    Patient Education: Counseled patient regarding the importance of activity modification, I have stressed the importance of physical activity and a home exercise plan to help with pain and improve health.    Follow-up: RTC 2-3 weeks after injection    May consider:  lateral femoral cutaneous nerve block,  right SI joint injection,  possible right hip pathology (!!)    Josef Mccauley MD  Anesthesiologist  Interventional Pain Medicine  07/30/2024    Disclaimer:  This note was prepared using voice recognition system and is likely to have sound alike errors that may have been overlooked even after proof reading.  Please call me with any questions.

## 2024-07-30 NOTE — TELEPHONE ENCOUNTER
----- Message from Josef Mccauley MD sent at 7/30/2024  9:24 AM CDT -----  · Interventional Therapy: Please schedule for Please schedule for  Right Transforaminal epidural steroid injection at L4 and L5 .  · Sedation:  IV sedation  · Clearance to stop Blood thinner:  None

## 2024-08-14 ENCOUNTER — TELEPHONE (OUTPATIENT)
Dept: PHARMACY | Facility: CLINIC | Age: 69
End: 2024-08-14
Payer: COMMERCIAL

## 2024-08-14 NOTE — TELEPHONE ENCOUNTER
[facility]/Population Health Chart Review & Patient Outreach Details For Medication Adherence Project    Reason for Outreach Encounter: 3rd Party payor non-compliance report ([employer], [employer], [facility], etc)  2.  Patient Outreach Method:   3.   Medication in question: ATORVASTATIN 20 MG   LAST FILLED: 7/13/24 for 90 day supply  Hyperlipidemia Medications               atorvastatin (LIPITOR) 20 MG tablet TAKE 1 TABLET BY MOUTH ONCE DAILY IN THE EVENING    niacin 500 MG CpSR as needed.               4.  Reviewed and or Updates Made To: Patient Chart  5. Outreach Outcomes and/or actions taken: Patient filled medication and is on track to be adherent

## 2024-08-23 ENCOUNTER — HOSPITAL ENCOUNTER (OUTPATIENT)
Facility: HOSPITAL | Age: 69
Discharge: HOME | End: 2024-08-23
Attending: ANESTHESIOLOGY | Admitting: ANESTHESIOLOGY
Payer: COMMERCIAL

## 2024-08-23 ENCOUNTER — HOSPITAL ENCOUNTER (OUTPATIENT)
Dept: RADIOLOGY | Facility: HOSPITAL | Age: 69
Discharge: HOME | End: 2024-08-23
Attending: ANESTHESIOLOGY | Admitting: ANESTHESIOLOGY
Payer: COMMERCIAL

## 2024-08-23 VITALS
HEART RATE: 76 BPM | DIASTOLIC BLOOD PRESSURE: 60 MMHG | OXYGEN SATURATION: 98 % | SYSTOLIC BLOOD PRESSURE: 122 MMHG | RESPIRATION RATE: 16 BRPM

## 2024-08-23 DIAGNOSIS — M54.17 LUMBOSACRAL RADICULOPATHY: ICD-10-CM

## 2024-08-23 DIAGNOSIS — M54.17 LUMBOSACRAL RADICULOPATHY: Primary | ICD-10-CM

## 2024-08-23 DIAGNOSIS — R52 PAIN: ICD-10-CM

## 2024-08-23 PROCEDURE — 64483 NJX AA&/STRD TFRM EPI L/S 1: CPT | Mod: RT | Performed by: ANESTHESIOLOGY

## 2024-08-23 PROCEDURE — 63600175 PHARM REV CODE 636 W HCPCS: Performed by: ANESTHESIOLOGY

## 2024-08-23 PROCEDURE — 64484 NJX AA&/STRD TFRM EPI L/S EA: CPT | Mod: RT,,, | Performed by: ANESTHESIOLOGY

## 2024-08-23 PROCEDURE — 76000 FLUOROSCOPY <1 HR PHYS/QHP: CPT | Mod: TC

## 2024-08-23 PROCEDURE — 25000003 PHARM REV CODE 250: Performed by: ANESTHESIOLOGY

## 2024-08-23 PROCEDURE — 25500020 PHARM REV CODE 255: Performed by: ANESTHESIOLOGY

## 2024-08-23 PROCEDURE — 64483 NJX AA&/STRD TFRM EPI L/S 1: CPT | Mod: RT,,, | Performed by: ANESTHESIOLOGY

## 2024-08-23 PROCEDURE — 64484 NJX AA&/STRD TFRM EPI L/S EA: CPT | Mod: RT | Performed by: ANESTHESIOLOGY

## 2024-08-23 RX ORDER — LIDOCAINE HYDROCHLORIDE 10 MG/ML
INJECTION, SOLUTION EPIDURAL; INFILTRATION; INTRACAUDAL; PERINEURAL
Status: DISCONTINUED | OUTPATIENT
Start: 2024-08-23 | End: 2024-08-23 | Stop reason: HOSPADM

## 2024-08-23 RX ORDER — MIDAZOLAM HYDROCHLORIDE 1 MG/ML
INJECTION INTRAMUSCULAR; INTRAVENOUS
Status: DISCONTINUED | OUTPATIENT
Start: 2024-08-23 | End: 2024-08-23 | Stop reason: HOSPADM

## 2024-08-23 RX ORDER — FENTANYL CITRATE 50 UG/ML
INJECTION, SOLUTION INTRAMUSCULAR; INTRAVENOUS
Status: DISCONTINUED | OUTPATIENT
Start: 2024-08-23 | End: 2024-08-23 | Stop reason: HOSPADM

## 2024-08-23 RX ORDER — LIDOCAINE HYDROCHLORIDE 20 MG/ML
INJECTION, SOLUTION INFILTRATION; PERINEURAL
Status: DISCONTINUED | OUTPATIENT
Start: 2024-08-23 | End: 2024-08-23 | Stop reason: HOSPADM

## 2024-08-23 RX ORDER — SODIUM CHLORIDE 9 MG/ML
500 INJECTION, SOLUTION INTRAVENOUS CONTINUOUS
Status: DISCONTINUED | OUTPATIENT
Start: 2024-08-23 | End: 2024-08-23 | Stop reason: HOSPADM

## 2024-08-23 RX ORDER — DEXAMETHASONE SODIUM PHOSPHATE 10 MG/ML
INJECTION INTRAMUSCULAR; INTRAVENOUS
Status: DISCONTINUED | OUTPATIENT
Start: 2024-08-23 | End: 2024-08-23 | Stop reason: HOSPADM

## 2024-08-23 NOTE — DISCHARGE SUMMARY
Discharge Note  Short Stay    Admit Date: 8/23/2024    Attending Physician: Josef Mccauley    Discharge Physician: Josef Mccauley    Discharge Date: 8/23/2024 7:59 AM    Procedure(s) (LRB):  TRANSFORAMINAL EPIDURAL STEROID INJECTION (L4&L5) (Right)    Final Diagnosis: Lumbosacral radiculopathy [M54.17]    Disposition: Home or self care    Patient Instructions:   Current Discharge Medication List        CONTINUE these medications which have NOT CHANGED    Details   atorvastatin (LIPITOR) 20 MG tablet TAKE 1 TABLET BY MOUTH ONCE DAILY IN THE EVENING  Qty: 90 tablet, Refills: 2      niacin 500 MG CpSR as needed.  Refills: 5      olmesartan (BENICAR) 40 MG tablet Take 1 tablet (40 mg total) by mouth once daily.  Qty: 30 tablet, Refills: 5    Comments: .  Associated Diagnoses: Essential hypertension      pantoprazole (PROTONIX) 40 MG tablet Take 1 tablet (40 mg total) by mouth once daily.  Qty: 30 tablet, Refills: 5    Associated Diagnoses: Chronic cough      sildenafil (REVATIO) 20 mg Tab sildenafil (pulmonary hypertension) 20 mg tablet   2-5 tablets 30 mins to 1 ht propr to desired activity  Qty: 30 tablet, Refills: 5    Associated Diagnoses: Erectile dysfunction, unspecified erectile dysfunction type             Discharge Diagnosis: Lumbosacral radiculopathy [M54.17]  Condition on Discharge: Stable with no complications to procedure   Diet on Discharge: Same as before.  Activity: as per instruction sheet.  Discharge to: Home with a responsible adult.  Follow up: 2-4 weeks       Please call my office or pager at [phone] if experienced any weakness or loss of sensation, fever > 101.5, pain uncontrolled with oral medications, persistent nausea/vomiting/or diarrhea, redness or drainage from the incisions, or any other worrisome concerns. If physician on call was not reached or could not communicate with our office for any reason please go to the nearest emergency department.     Josef Mccauley  08/23/2024

## 2024-08-23 NOTE — OP NOTE
Lumbar Transforaminal Epidural Steroid Injection under Fluoroscopic Guidance    The procedure, risks, benefits, and options were discussed with the patient. There are no contraindications to the procedure. The patent expressed understanding and agreed to the procedure. Informed written consent was obtained prior to the start of the procedure and can be found in the patient's chart.    PATIENT NAME: Sal Rowley   MRN: [id number]     DATE OF PROCEDURE: 08/23/2024    PROCEDURE:  Right  L4/5 and L5/S1 Lumbar Transforaminal Epidural Steroid Injection under Fluoroscopic Guidance    PRE-OP DIAGNOSIS: Lumbosacral radiculopathy [M54.17] Lumbar radiculopathy [M54.16]    POST-OP DIAGNOSIS: Same    PHYSICIAN: Josef Mccauley MD    MEDICATIONS INJECTED: Preservative-free Decadron 10mg with 5cc of Lidocaine 1% MPF     LOCAL ANESTHETIC INJECTED: Xylocaine 2%     SEDATION: None    ESTIMATED BLOOD LOSS: None    COMPLICATIONS: None    TECHNIQUE: Time-out was performed to identify the patient and procedure to be performed. With the patient laying in a prone position, the surgical area was prepped and draped in the usual sterile fashion using ChloraPrep and a fenestrated drape.The levels were determined under fluoroscopy guidance. Skin anesthesia was achieved by injecting Lidocaine 2% over the injection sites. The transforaminal spaces were then approached with a 22 gauge, 5 inch spinal quinke needle that was introduced under fluoroscopic guidance in the AP and Lateral views. Once the needle tip was in the area of the transforaminal space, and there was no blood, CSF or paraesthesias, contrast dye Omnipaque (300mg/mL) was injected to confirm placement and there was no vascular runoff. Fluoroscopic imaging in the AP and lateral views revealed a clear outline of the spinal nerve with proximal spread of agent through the neural foramen into the epidural space. 2 mL of the medication mixture listed above was injected slowly at each site.  Displacement of the radio opaque contrast after injection of the medication confirmed that the medication went into the area of the transforaminal spaces. The needles were removed and bleeding was nil. A sterile dressing was applied. No specimens collected. The patient tolerated the procedure well.       The patient was monitored after the procedure in the recovery area. They were given post-procedure and discharge instructions to follow at home. The patient was discharged in a stable condition.      Josef Mccauley MD

## 2024-09-26 ENCOUNTER — OFFICE VISIT (OUTPATIENT)
Dept: PAIN MEDICINE | Facility: CLINIC | Age: 69
End: 2024-09-26
Payer: COMMERCIAL

## 2024-09-26 DIAGNOSIS — M54.17 LUMBOSACRAL RADICULOPATHY: ICD-10-CM

## 2024-09-26 DIAGNOSIS — G57.11 MERALGIA PARAESTHETICA, RIGHT: Primary | ICD-10-CM

## 2024-09-26 PROCEDURE — 99999 PR PBB SHADOW E&M-EST. PATIENT-LVL II: CPT | Mod: PBBFAC,,, | Performed by: ANESTHESIOLOGY

## 2024-09-26 RX ORDER — LIDOCAINE 50 MG/G
1 PATCH TOPICAL DAILY
Qty: 15 PATCH | Refills: 0 | Status: SHIPPED | OUTPATIENT
Start: 2024-09-26

## 2024-09-26 NOTE — PROGRESS NOTES
[facility] EST Patient Evaluation    Sal Rowley  : 1955  Date: 2024     CHIEF COMPLAINT:  No chief complaint on file.  Referring Physician: No ref. provider found  Primary Care Physician: Justice Mccarty MD    HPI:  This is a 69 y.o. male with a chief complaint of No chief complaint on file.  . The patient has Past medical history/Past surgical history of left rotator cuff muscle tear, hypertension, hyperlipidemia, acid reflux    Patient was evaluated and referred by Neurology provider for lumbar radiculopathy.    He had EMG/nerve conduction study completed in 2024 that showed chronic and mild lumbosacral radiculopathy from L3-S1 bilaterally.    He started physical therapy in 2024 with mild relief  MRI lumbar spine updated in 2023 showed grade 1 retrolisthesis and anterolisthesis across the lumbar spine.  Has multilevel facet arthropathy with mild-to-moderate neural foramina narrowing at multiple levels worse at L4-L5  Does not take any pain medication  Never tried gabapentin or Lyrica before.    Interval History 2024:  Sal Rowley is here for follow up visit after lumbar epidural steroid injection at L4-5 in , he reported 50% improvement in pain and functionality after injection for back pain symptoms however he still has numbness in the right leg  Was seen by Dr. Ward 2024,  for numbness and burning in right LE.  Current pain score: 0/10 Spine       Interval History 2024:  Sal Rowley is here for procedure follow up visit after right L4 and L5 transforaminal epidural steroid injection in ,  patient reported *** % improvement in pain and functionality.  Current pain score: ***/10        Diabetic: No    Anticogualtion drugs: None    Allergy To Iodine: No    Currently on Antibiotic: No    Current Description of Pain Symptoms:    History of Recent Fall or Trauma: No   Onset: Chronic, started  11/2021 after accident  Pain Location: lower back  Radiates/associated symptoms:  Anterior aspect of right thigh.   Pain is Getting worse over the last 3 months    The pain is described as aching, numbing, and stabbing.   Exacerbating factors: Standing.   Mitigating factors laying down.   Symptoms interfere with daily activity, sleeping, and work.   The patient feels like symptoms have been slightly improving  Patient denies night fever/night sweats, urinary incontinence, bowel incontinence, significant weight loss, significant motor weakness, and loss of sensations.    Pain score:   Best: 1/10  Worst: 10/10    Current pain medications:  NA    Current Narcotics/Opioid /benzo Medications:  Opioids- None  Benzodiazepines: No    UDS:  NA    PDMP:  Reviewed and consistent with medication use as prescribed.     Previous Chronic Pain Treatment History:  Physical Therapy/HEP/Physician Lead Exercise Program:  Over the past 12 months, Patient has done  6+ sessions.  PT response: Not Helpful.   Dates of the PT sessions: 2/2024  Is patient participating in home exercise program (HEP): No.    Non-interventional Pain Therapy:  []Chiropractor.   []Acupuncture/Dry needle.  []TENS unit.  []Heat/ICE.  []Back Brace.    Medications previously tried:  NSAIDs: Ibuprofen (Advil/Motrin) and Celebrex (Celecoxib)  Topical Agent: No  TCA/SSRI/SNRI: None  Anti-convulsants: None  Muscle Relaxants: None  Opioids- Tramadol (Ultram).    Interventional Pain Procedures:  04/2024:  lumbar epidural steroid injection at L4-5, 50% improvement in pain and functionality  ***  Previous spine/Relevant joint surgery:  none  Surgical History:   has a past surgical history that includes Knee arthroscopy; Tonsillectomy; Rotator cuff repair (Right); Carpal tunnel release (Left, 3/27/2023); Carpal tunnel release (Right, 4/10/2023); Colonoscopy (N/A, 7/25/2023); Epidural steroid injection into lumbar spine (N/A, 4/12/2024); and Transforaminal epidural injection  "of steroid (Right, 8/23/2024).  Medical History:   has a past medical history of Hypertension, Mixed hyperlipidemia, and Osteoarthritis.  Family History:  family history is not on file.  Allergies:  Patient has no known allergies.   Social History/SUBSTANCE ABUSE HISTORY:  Personal history of substance abuse: No   reports that he quit smoking about 34 years ago. His smoking use included cigarettes. He does not have any smokeless tobacco history on file. He reports current alcohol use. He reports that he does not use drugs.  LABS:  CBC  Lab Results   Component Value Date    WBC 7.13 09/29/2023    HGB 14.7 09/29/2023    HCT 43.2 09/29/2023     Coagulation Profile   Lab Results   Component Value Date     09/29/2023     No results found for: "PT", "PTT", "INR"  CMP:  BMP  Lab Results   Component Value Date     04/01/2024    K 4.6 04/01/2024     04/01/2024    CO2 26 04/01/2024    BUN 21 04/01/2024    CREATININE 1.1 04/01/2024    CALCIUM 8.9 04/01/2024    ANIONGAP 6 (L) 04/01/2024    EGFRNORACEVR >60 04/01/2024     Lab Results   Component Value Date    ALT 74 (H) 04/01/2024    AST 65 (H) 04/01/2024    ALKPHOS 54 (L) 04/01/2024    BILITOT 0.4 04/01/2024     HGBA1C:  Lab Results   Component Value Date    HGBA1C 5.8 (H) 09/29/2023   ROS:    Review of Systems   GENERAL:  No weight loss, malaise or fevers.  HEENT:   No recent changes in vision or hearing  NECK:  Negative for lumps, no difficulty with swallowing.  RESPIRATORY:  Negative for cough, wheezing or shortness of breath, patient denies any recent URI.  CARDIOVASCULAR:  Negative for chest pain, leg swelling or palpitations.  GI:  Negative for abdominal discomfort, blood in stools or black stools or change in bowel habits.  MUSCULOSKELETAL:  See HPI.  SKIN:  Negative for lesions, rash, and itching.  PSYCH:  No mood disorder or recent psychosocial stressors.   HEMATOLOGY/LYMPHOLOGY:  See the blood thinner sectioned in HPI.  NEURO:  See HPI  All other " reviewed and negative other than HPI.    PHYSICAL EXAM:  VITALS: There were no vitals taken for this visit.  There is no height or weight on file to calculate BMI.  GENERAL: Well appearing, in no acute distress, alert and oriented x3, answers questions appropriately.   PSYCH: Flat affect.  SKIN: Skin color, texture, turgor normal, no rashes or lesions.  HEAD/FACE:  Normocephalic, atraumatic. Cranial nerves grossly intact.  CV: Regular rate  PULM: No evidence of respiratory difficulty, symmetric chest rise.  GI:  Soft and non-Distended.  BACK/SIJ/HIP:  Lumbar Spine Exam:       Inspection: No erythema, bruising.       Palpation: (+) TTP of lumbar paraspinals bilaterally      ROM:  Limited in flexion, extension, lateral bending.       (mild +) Facet loading bilaterally      (+) Straight Leg Raise bilaterally      (-) DHAVAL bilaterally, Tenderness over the PSIS, Yeoman test  Hip Exam:      Inspection: No gross deformity or apparent leg length discrepancy      Palpation:  No TTP to bilateral greater trochanteric bursas.       ROM:  No limitation or pain in internal rotation, external rotation b/l  Neurologic Exam:     Alert. Speech is fluent and appropriate.     Strength: 5/5 in BL hip flexion and knee extension,      Sensation:  Grossly intact to light touch in bilateral lower extremities     Tone: No abnormality appreciated in bilateral lower extremities  GAIT:  Normal gait    DIAGNOSTIC STUDIES AND MEDICAL RECORDS REVIEW:    EMG/NCS 2024  Chronic (more than 6 weeks old) and mild lumbosacral radiculopathy from L3-S1 bilaterally    MRI lumbar spine 12/2023   Marrow signal is within normal limits.  The conus lies at the L1 vertebral body level.  There is no evidence clumping of the nerve roots.     L1/2: There is grade 1 retrolisthesis of L1 with respect L2.  There is a circumferential disc bulge resulting in mild narrowing of bilateral neural foramina and mild narrowing of the central canal.     L2/3: There is grade 1  retrolisthesis of L2 with respect L3 and a circumferential disc bulge resulting in mild narrowing of bilateral neural foramina and mild narrowing of the central canal.     L3/4: There is a circumferential disc bulge resulting in moderate narrowing of bilateral neural foramina and moderate narrowing of the central canal.     L4/L5: There is grade 1 anterolisthesis of L4 with respect L5 and a circumferential disc bulge resulting in moderate narrowing of bilateral neural foramina and moderate central canal narrowing.  There is bilateral facet arthropathy.     L5/S1: There is grade 1 anterolisthesis of L5 with respect S1 and a circumferential disc protrusion with moderate narrowing of bilateral neural foramina.     X-ray lumbar spine flexion-extension 03/2024 showed There is retrolisthesis of T12 on L1, L1 on L2, L2 on L3 and L3 on L4 with anterolisthesis of L4 on L5 and L5 on S1. The anterolisthesis at L4-5 and L5-S1 appears to worsen on flexion imaging and improves on extension imaging concerning for ligamentous instability. There is also slight worsening of retrolisthesis in the upper lumbar spine on extension imaging concerning for ligamentous instability.      ASSESSMENT:  Sal Rowley is a 69 y.o. male with the following diagnoses based on history, exam, and imaging:  There are no diagnoses linked to this encounter.     This is a pleasant 69 y.o. male patient with PMH  left rotator cuff muscle tear, hypertension, hyperlipidemia, acid reflux, presenting with low back pain stabbing and aching in nature in addition to anterior aspect right thigh numbness and tingling that has been worsening since the accident 2021, patient had is completed multiple sessions of physical therapy in 2023 without significant improvement in pain and functionality, had MRI lumbar spine that show multilevel anteriorlisthesis and retrolisthesis, facet arthropathy, moderate bilateral neural foramina narrowing and moderate central canal  stenosis most specifically at L4-L5, he had lumbar epidural steroid injection at L4-L5 with a  moderate improvement 50% in his low back pain however his numbness in his right lower extremity is still bothering him,  followed by right L4 and L5 transforaminal epidural steroid injection    Treatment Plan:    Diagnostics/Referrals:  HEP    Medications:    NSAIDs: None  Topical Agent: No  TCA/SSRI/SNRI: None  Anti-convulsants: None  Muscle Relaxants: None  Opioids: None    Interventional Therapy: Please schedule for Please schedule for  Right Transforaminal epidural steroid injection at L4 and L5 .  Sedation:  IV sedation  Clearance to stop Blood thinner:  None    Regarding the above interventions, the patient has been educated regarding the risks (including bleeding, infection, increased pain, nerve damage, or allergic reaction), benefits, and alternatives. The patient states he understands and is eager to proceed.    Physical Rehabilitation:  Continuing physical therapy.    Patient Education: Counseled patient regarding the importance of activity modification, I have stressed the importance of physical activity and a home exercise plan to help with pain and improve health.    Follow-up: RTC 2-3 weeks after injection    May consider:  lateral femoral cutaneous nerve block,  right SI joint injection,  possible right hip pathology (!!)    Josef Mccauley MD  Anesthesiologist  Interventional Pain Medicine  09/26/2024    Disclaimer:  This note was prepared using voice recognition system and is likely to have sound alike errors that may have been overlooked even after proof reading.  Please call me with any questions.

## 2024-09-26 NOTE — PROGRESS NOTES
[facility] EST Patient Evaluation    Sal Rowley  : 1955  Date: 2024     CHIEF COMPLAINT:  Follow-up  Referring Physician: No ref. provider found  Primary Care Physician: Justice Mccarty MD    HPI:  This is a 69 y.o. male with a chief complaint of Follow-up  . The patient has Past medical history/Past surgical history of left rotator cuff muscle tear, hypertension, hyperlipidemia, acid reflux    Patient was evaluated and referred by Neurology provider for lumbar radiculopathy.    He had EMG/nerve conduction study completed in 2024 that showed chronic and mild lumbosacral radiculopathy from L3-S1 bilaterally.    He started physical therapy in 2024 with mild relief  MRI lumbar spine updated in 2023 showed grade 1 retrolisthesis and anterolisthesis across the lumbar spine.  Has multilevel facet arthropathy with mild-to-moderate neural foramina narrowing at multiple levels worse at L4-L5  Does not take any pain medication  Never tried gabapentin or Lyrica before.    Interval History 2024:  Sal Rowley is here for follow up visit after lumbar epidural steroid injection at L4-5 in , he reported 50% improvement in pain and functionality after injection for back pain symptoms however he still has numbness in the right leg  Was seen by Dr. Ward 2024,  for numbness and burning in right LE.  Current pain score: 0/10 Spine       Interval History 2024:  Sal Rowley is here for procedure follow up visit after right L4 and L5 transforaminal epidural steroid injection in ,  patient reported 80% for his low back pain, howerver it did not help Rt thigh numbness.  Current pain score: 0/10      Diabetic: No    Anticogualtion drugs: None    Allergy To Iodine: No    Currently on Antibiotic: No    Current Description of Pain Symptoms:  History of Recent Fall or Trauma: No   Onset: Chronic, started 2021 after  accident  Pain Location: Very mild lower back  Radiates/associated symptoms:  Anterior aspect of right thigh.   Pain is Getting worse over the last 3 months    The pain is described as aching, numbing, and stabbing.   Exacerbating factors: Standing  and sitting for a long period of time  Mitigating factors:  changing position.  Symptoms interfere with daily activity, sleeping, and work.   The patient feels like symptoms have been slightly improving  Patient denies night fever/night sweats, urinary incontinence, bowel incontinence, significant weight loss, significant motor weakness, and loss of sensations.    Pain score:   Best: 1/10  Worst: 10/10    Current pain medications:  NA    Current Narcotics/Opioid /benzo Medications:  Opioids- None  Benzodiazepines: No    UDS:  NA    PDMP:  Reviewed and consistent with medication use as prescribed.   Previous Chronic Pain Treatment History:  Physical Therapy/HEP/Physician Lead Exercise Program:  Over the past 12 months, Patient has done  6+ sessions.  PT response: Not Helpful.   Dates of the PT sessions: 2/2024  Is patient participating in home exercise program (HEP): No.    Non-interventional Pain Therapy:  []Chiropractor.   []Acupuncture/Dry needle.  []TENS unit.  []Heat/ICE.  []Back Brace.    Medications previously tried:  NSAIDs: Ibuprofen (Advil/Motrin) and Celebrex (Celecoxib)  Topical Agent: No  TCA/SSRI/SNRI: None  Anti-convulsants: None  Muscle Relaxants: None  Opioids- Tramadol (Ultram).    Interventional Pain Procedures:  04/2024:  lumbar epidural steroid injection at L4-5, 50% improvement in pain and functionality  08/2024: right L4 and L5 transforaminal epidural steroid injection -  more relief for his low back pain, no relief for right thigh numbness  Previous spine/Relevant joint surgery:  none  Surgical History:   has a past surgical history that includes Knee arthroscopy; Tonsillectomy; Rotator cuff repair (Right); Carpal tunnel release (Left, 3/27/2023);  "Carpal tunnel release (Right, 4/10/2023); Colonoscopy (N/A, 7/25/2023); Epidural steroid injection into lumbar spine (N/A, 4/12/2024); and Transforaminal epidural injection of steroid (Right, 8/23/2024).  Medical History:   has a past medical history of Hypertension, Mixed hyperlipidemia, and Osteoarthritis.  Family History:  family history is not on file.  Allergies:  Patient has no known allergies.   Social History/SUBSTANCE ABUSE HISTORY:  Personal history of substance abuse: No   reports that he quit smoking about 34 years ago. His smoking use included cigarettes. He does not have any smokeless tobacco history on file. He reports current alcohol use. He reports that he does not use drugs.  LABS:  CBC  Lab Results   Component Value Date    WBC 7.13 09/29/2023    HGB 14.7 09/29/2023    HCT 43.2 09/29/2023     Coagulation Profile   Lab Results   Component Value Date     09/29/2023     No results found for: "PT", "PTT", "INR"  CMP:  BMP  Lab Results   Component Value Date     04/01/2024    K 4.6 04/01/2024     04/01/2024    CO2 26 04/01/2024    BUN 21 04/01/2024    CREATININE 1.1 04/01/2024    CALCIUM 8.9 04/01/2024    ANIONGAP 6 (L) 04/01/2024    EGFRNORACEVR >60 04/01/2024     Lab Results   Component Value Date    ALT 74 (H) 04/01/2024    AST 65 (H) 04/01/2024    ALKPHOS 54 (L) 04/01/2024    BILITOT 0.4 04/01/2024     HGBA1C:  Lab Results   Component Value Date    HGBA1C 5.8 (H) 09/29/2023   ROS:    Review of Systems   GENERAL:  No weight loss, malaise or fevers.  HEENT:   No recent changes in vision or hearing  NECK:  Negative for lumps, no difficulty with swallowing.  RESPIRATORY:  Negative for cough, wheezing or shortness of breath, patient denies any recent URI.  CARDIOVASCULAR:  Negative for chest pain, leg swelling or palpitations.  GI:  Negative for abdominal discomfort, blood in stools or black stools or change in bowel habits.  MUSCULOSKELETAL:  See HPI.  SKIN:  Negative for lesions, " rash, and itching.  PSYCH:  No mood disorder or recent psychosocial stressors.   HEMATOLOGY/LYMPHOLOGY:  See the blood thinner sectioned in HPI.  NEURO:  See HPI  All other reviewed and negative other than HPI.    PHYSICAL EXAM:  VITALS: There were no vitals taken for this visit.  There is no height or weight on file to calculate BMI.  GENERAL: Well appearing, in no acute distress, alert and oriented x3, answers questions appropriately.   PSYCH: Flat affect.  SKIN: Skin color, texture, turgor normal, no rashes or lesions.  HEAD/FACE:  Normocephalic, atraumatic. Cranial nerves grossly intact.  CV: Regular rate  PULM: No evidence of respiratory difficulty, symmetric chest rise.  GI:  Soft and non-Distended.  BACK/SIJ/HIP:  Lumbar Spine Exam:       Inspection: No erythema, bruising.       Palpation: (+) TTP of lumbar paraspinals bilaterally      ROM:  Limited in flexion, extension, lateral bending.       (mild +) Facet loading bilaterally      (+) Straight Leg Raise bilaterally      (-) DHAVAL bilaterally, Tenderness over the PSIS, Yeoman test  Hip Exam:      Inspection: No gross deformity or apparent leg length discrepancy      Palpation:  No TTP to bilateral greater trochanteric bursas,  decreased sensation of the lateral aspect right thigh      ROM:  No limitation or pain in internal rotation, external rotation b/l  Neurologic Exam:     Alert. Speech is fluent and appropriate.     Strength: 5/5 in BL hip flexion and knee extension,      Sensation:  Grossly intact to light touch in bilateral lower extremities     Tone: No abnormality appreciated in bilateral lower extremities  GAIT:  Normal gait    DIAGNOSTIC STUDIES AND MEDICAL RECORDS REVIEW:    EMG/NCS 2024  Chronic (more than 6 weeks old) and mild lumbosacral radiculopathy from L3-S1 bilaterally    MRI lumbar spine 12/2023  Marrow signal is within normal limits.  The conus lies at the L1 vertebral body level.  There is no evidence clumping of the nerve roots.      L1/2: There is grade 1 retrolisthesis of L1 with respect L2.  There is a circumferential disc bulge resulting in mild narrowing of bilateral neural foramina and mild narrowing of the central canal.     L2/3: There is grade 1 retrolisthesis of L2 with respect L3 and a circumferential disc bulge resulting in mild narrowing of bilateral neural foramina and mild narrowing of the central canal.     L3/4: There is a circumferential disc bulge resulting in moderate narrowing of bilateral neural foramina and moderate narrowing of the central canal.     L4/L5: There is grade 1 anterolisthesis of L4 with respect L5 and a circumferential disc bulge resulting in moderate narrowing of bilateral neural foramina and moderate central canal narrowing.  There is bilateral facet arthropathy.     L5/S1: There is grade 1 anterolisthesis of L5 with respect S1 and a circumferential disc protrusion with moderate narrowing of bilateral neural foramina.     X-ray lumbar spine flexion-extension 2024 showed There is retrolisthesis of T12 on L1, L1 on L2, L2 on L3 and L3 on L4 with anterolisthesis of L4 on L5 and L5 on S1. The anterolisthesis at L4-5 and L5-S1 appears to worsen on flexion imaging and improves on extension imaging concerning for ligamentous instability. There is also slight worsening of retrolisthesis in the upper lumbar spine on extension imaging concerning for ligamentous instability.      ASSESSMENT:  Sal Rowley is a 69 y.o. male with the following diagnoses based on history, exam, and imagin. Meralgia paraesthetica, right    2. Lumbosacral radiculopathy    This is a pleasant 69 y.o. male patient with PMH  left rotator cuff muscle tear, hypertension, hyperlipidemia, acid reflux, presenting with low back pain stabbing and aching in nature in addition to anterior- lateral aspect right thigh numbness and tingling that has been worsening since the accident , patient had is completed multiple sessions of  physical therapy in 2023 without significant improvement in pain and functionality, had MRI lumbar spine that show multilevel anteriorlisthesis and retrolisthesis, facet arthropathy, moderate bilateral neural foramina narrowing and moderate central canal stenosis most specifically at L4-L5, he had lumbar epidural steroid injection at L4-L5 with a  moderate improvement 50% in his low back pain however his numbness in his right lower extremity is still bothering him,  followed by right L4 and L5 transforaminal epidural steroid injection  which helped his pain significantly up to 80% improvement in pain and functionality,  however no relief for his right thigh numbness, I believe most of his pain comes from lateral femoral cutaneous nerve versus  right L2-L3  radiculopathy    Treatment Plan:    Diagnostics/Referrals:  HEP    Medications:    NSAIDs: None  Topical Agent: Lidocaine patch + Compound cream  TCA/SSRI/SNRI: None  Anti-convulsants: None  Muscle Relaxants: None  Opioids: None    Interventional Therapy: Please schedule for Right lateral femoral cutaneous nerve block, US guided upon his request.  Sedation:  Oral sedation  Clearance to stop Blood thinner:  None    Regarding the above interventions, the patient has been educated regarding the risks (including bleeding, infection, increased pain, nerve damage, or allergic reaction), benefits, and alternatives. The patient states he understands and is eager to proceed.    Physical Rehabilitation:  Continuing physical therapy.    Patient Education: Counseled patient regarding the importance of activity modification, I have stressed the importance of physical activity and a home exercise plan to help with pain and improve health.    Follow-up: RTC 2-3 weeks after injection    May consider:   right L2-L3 transforaminal.    Josef Mccauley MD  Anesthesiologist  Interventional Pain Medicine  09/26/2024    Disclaimer:  This note was prepared using voice recognition system and  is likely to have sound alike errors that may have been overlooked even after proof reading.  Please call me with any questions.

## 2024-09-26 NOTE — PROGRESS NOTES
[facility] EST Patient Evaluation    Sal Rowley  : 1955  Date: 2024     CHIEF COMPLAINT:  Follow-up  Referring Physician: No ref. provider found  Primary Care Physician: Justice Mccarty MD    HPI:  This is a 69 y.o. male with a chief complaint of Follow-up  . The patient has Past medical history/Past surgical history of left rotator cuff muscle tear, hypertension, hyperlipidemia, acid reflux    Patient was evaluated and referred by Neurology provider for lumbar radiculopathy.    He had EMG/nerve conduction study completed in 2024 that showed chronic and mild lumbosacral radiculopathy from L3-S1 bilaterally.    He started physical therapy in 2024 with mild relief  MRI lumbar spine updated in 2023 showed grade 1 retrolisthesis and anterolisthesis across the lumbar spine.  Has multilevel facet arthropathy with mild-to-moderate neural foramina narrowing at multiple levels worse at L4-L5  Does not take any pain medication  Never tried gabapentin or Lyrica before.    Interval History 2024:  Sal Rowley is here for follow up visit after lumbar epidural steroid injection at L4-5 in , he reported 50% improvement in pain and functionality after injection for back pain symptoms however he still has numbness in the right leg  Was seen by Dr. Ward 2024,  for numbness and burning in right LE.  Current pain score: 0/10 Spine       Interval History 2024:  Sal Rowley is here for procedure follow up visit after right L4 and L5 transforaminal epidural steroid injection in ,  patient reported 80% for his low back pain, howerver it did not help Rt thigh numbness   Current pain score: 0/10        Diabetic: No    Anticogualtion drugs: None    Allergy To Iodine: No    Currently on Antibiotic: No    Current Description of Pain Symptoms:    History of Recent Fall or Trauma: No   Onset: Chronic, started 2021 after  accident  Pain Location: lower back  Radiates/associated symptoms:  Anterior aspect of right thigh.   Pain is Getting worse over the last 3 months    The pain is described as aching, numbing, and stabbing.   Exacerbating factors: Standing.   Mitigating factors laying down.   Symptoms interfere with daily activity, sleeping, and work.   The patient feels like symptoms have been slightly improving  Patient denies night fever/night sweats, urinary incontinence, bowel incontinence, significant weight loss, significant motor weakness, and loss of sensations.    Pain score:   Best: 1/10  Worst: 10/10    Current pain medications:  NA    Current Narcotics/Opioid /benzo Medications:  Opioids- None  Benzodiazepines: No    UDS:  NA    PDMP:  Reviewed and consistent with medication use as prescribed.     Previous Chronic Pain Treatment History:  Physical Therapy/HEP/Physician Lead Exercise Program:  Over the past 12 months, Patient has done  6+ sessions.  PT response: Not Helpful.   Dates of the PT sessions: 2/2024  Is patient participating in home exercise program (HEP): No.    Non-interventional Pain Therapy:  []Chiropractor.   []Acupuncture/Dry needle.  []TENS unit.  []Heat/ICE.  []Back Brace.    Medications previously tried:  NSAIDs: Ibuprofen (Advil/Motrin) and Celebrex (Celecoxib)  Topical Agent: No  TCA/SSRI/SNRI: None  Anti-convulsants: None  Muscle Relaxants: None  Opioids- Tramadol (Ultram).    Interventional Pain Procedures:  04/2024:  lumbar epidural steroid injection at L4-5, 50% improvement in pain and functionality  08/2024: right L4 and L5 transforaminal epidural steroid injection - 0% relief   Previous spine/Relevant joint surgery:  none  Surgical History:   has a past surgical history that includes Knee arthroscopy; Tonsillectomy; Rotator cuff repair (Right); Carpal tunnel release (Left, 3/27/2023); Carpal tunnel release (Right, 4/10/2023); Colonoscopy (N/A, 7/25/2023); Epidural steroid injection into lumbar  "spine (N/A, 4/12/2024); and Transforaminal epidural injection of steroid (Right, 8/23/2024).  Medical History:   has a past medical history of Hypertension, Mixed hyperlipidemia, and Osteoarthritis.  Family History:  family history is not on file.  Allergies:  Patient has no known allergies.   Social History/SUBSTANCE ABUSE HISTORY:  Personal history of substance abuse: No   reports that he quit smoking about 34 years ago. His smoking use included cigarettes. He does not have any smokeless tobacco history on file. He reports current alcohol use. He reports that he does not use drugs.  LABS:  CBC  Lab Results   Component Value Date    WBC 7.13 09/29/2023    HGB 14.7 09/29/2023    HCT 43.2 09/29/2023     Coagulation Profile   Lab Results   Component Value Date     09/29/2023     No results found for: "PT", "PTT", "INR"  CMP:  BMP  Lab Results   Component Value Date     04/01/2024    K 4.6 04/01/2024     04/01/2024    CO2 26 04/01/2024    BUN 21 04/01/2024    CREATININE 1.1 04/01/2024    CALCIUM 8.9 04/01/2024    ANIONGAP 6 (L) 04/01/2024    EGFRNORACEVR >60 04/01/2024     Lab Results   Component Value Date    ALT 74 (H) 04/01/2024    AST 65 (H) 04/01/2024    ALKPHOS 54 (L) 04/01/2024    BILITOT 0.4 04/01/2024     HGBA1C:  Lab Results   Component Value Date    HGBA1C 5.8 (H) 09/29/2023   ROS:    Review of Systems   GENERAL:  No weight loss, malaise or fevers.  HEENT:   No recent changes in vision or hearing  NECK:  Negative for lumps, no difficulty with swallowing.  RESPIRATORY:  Negative for cough, wheezing or shortness of breath, patient denies any recent URI.  CARDIOVASCULAR:  Negative for chest pain, leg swelling or palpitations.  GI:  Negative for abdominal discomfort, blood in stools or black stools or change in bowel habits.  MUSCULOSKELETAL:  See HPI.  SKIN:  Negative for lesions, rash, and itching.  PSYCH:  No mood disorder or recent psychosocial stressors.   HEMATOLOGY/LYMPHOLOGY:  See the " blood thinner sectioned in HPI.  NEURO:  See HPI  All other reviewed and negative other than HPI.    PHYSICAL EXAM:  VITALS: There were no vitals taken for this visit.  There is no height or weight on file to calculate BMI.  GENERAL: Well appearing, in no acute distress, alert and oriented x3, answers questions appropriately.   PSYCH: Flat affect.  SKIN: Skin color, texture, turgor normal, no rashes or lesions.  HEAD/FACE:  Normocephalic, atraumatic. Cranial nerves grossly intact.  CV: Regular rate  PULM: No evidence of respiratory difficulty, symmetric chest rise.  GI:  Soft and non-Distended.  BACK/SIJ/HIP:  Lumbar Spine Exam:       Inspection: No erythema, bruising.       Palpation: (+) TTP of lumbar paraspinals bilaterally      ROM:  Limited in flexion, extension, lateral bending.       (mild +) Facet loading bilaterally      (+) Straight Leg Raise bilaterally      (-) DHAVAL bilaterally, Tenderness over the PSIS, Yeoman test  Hip Exam:      Inspection: No gross deformity or apparent leg length discrepancy      Palpation:  No TTP to bilateral greater trochanteric bursas.       ROM:  No limitation or pain in internal rotation, external rotation b/l  Neurologic Exam:     Alert. Speech is fluent and appropriate.     Strength: 5/5 in BL hip flexion and knee extension,      Sensation:  Grossly intact to light touch in bilateral lower extremities     Tone: No abnormality appreciated in bilateral lower extremities  GAIT:  Normal gait    DIAGNOSTIC STUDIES AND MEDICAL RECORDS REVIEW:    EMG/NCS 2024  Chronic (more than 6 weeks old) and mild lumbosacral radiculopathy from L3-S1 bilaterally    MRI lumbar spine 12/2023   Marrow signal is within normal limits.  The conus lies at the L1 vertebral body level.  There is no evidence clumping of the nerve roots.     L1/2: There is grade 1 retrolisthesis of L1 with respect L2.  There is a circumferential disc bulge resulting in mild narrowing of bilateral neural foramina and mild  narrowing of the central canal.     L2/3: There is grade 1 retrolisthesis of L2 with respect L3 and a circumferential disc bulge resulting in mild narrowing of bilateral neural foramina and mild narrowing of the central canal.     L3/4: There is a circumferential disc bulge resulting in moderate narrowing of bilateral neural foramina and moderate narrowing of the central canal.     L4/L5: There is grade 1 anterolisthesis of L4 with respect L5 and a circumferential disc bulge resulting in moderate narrowing of bilateral neural foramina and moderate central canal narrowing.  There is bilateral facet arthropathy.     L5/S1: There is grade 1 anterolisthesis of L5 with respect S1 and a circumferential disc protrusion with moderate narrowing of bilateral neural foramina.     X-ray lumbar spine flexion-extension 03/2024 showed There is retrolisthesis of T12 on L1, L1 on L2, L2 on L3 and L3 on L4 with anterolisthesis of L4 on L5 and L5 on S1. The anterolisthesis at L4-5 and L5-S1 appears to worsen on flexion imaging and improves on extension imaging concerning for ligamentous instability. There is also slight worsening of retrolisthesis in the upper lumbar spine on extension imaging concerning for ligamentous instability.      ASSESSMENT:  Sal Rowley is a 69 y.o. male with the following diagnoses based on history, exam, and imaging:  There are no diagnoses linked to this encounter.     This is a pleasant 69 y.o. male patient with PMH  left rotator cuff muscle tear, hypertension, hyperlipidemia, acid reflux, presenting with low back pain stabbing and aching in nature in addition to anterior aspect right thigh numbness and tingling that has been worsening since the accident 2021, patient had is completed multiple sessions of physical therapy in 2023 without significant improvement in pain and functionality, had MRI lumbar spine that show multilevel anteriorlisthesis and retrolisthesis, facet arthropathy, moderate  bilateral neural foramina narrowing and moderate central canal stenosis most specifically at L4-L5, he had lumbar epidural steroid injection at L4-L5 with a  moderate improvement 50% in his low back pain however his numbness in his right lower extremity is still bothering him,  followed by right L4 and L5 transforaminal epidural steroid injection    Treatment Plan:    Diagnostics/Referrals:  HEP    Medications:    NSAIDs: None  Topical Agent: Lidocaine patch+ Compound cream  TCA/SSRI/SNRI: None  Anti-convulsants: None  Muscle Relaxants: None  Opioids: None    Interventional Therapy: Please schedule for Right lateral femoral cutaneous nerve block, US guided upon your request   Sedation:  Oral sedation  Clearance to stop Blood thinner:  None    Regarding the above interventions, the patient has been educated regarding the risks (including bleeding, infection, increased pain, nerve damage, or allergic reaction), benefits, and alternatives. The patient states he understands and is eager to proceed.    Physical Rehabilitation:  Continuing physical therapy.    Patient Education: Counseled patient regarding the importance of activity modification, I have stressed the importance of physical activity and a home exercise plan to help with pain and improve health.    Follow-up: RTC 2-3 weeks after injection    May consider:     Josef Mccauley MD  Anesthesiologist  Interventional Pain Medicine  09/26/2024    Disclaimer:  This note was prepared using voice recognition system and is likely to have sound alike errors that may have been overlooked even after proof reading.  Please call me with any questions.

## 2024-09-30 ENCOUNTER — OFFICE VISIT (OUTPATIENT)
Dept: FAMILY MEDICINE | Facility: CLINIC | Age: 69
End: 2024-09-30
Payer: COMMERCIAL

## 2024-09-30 ENCOUNTER — CLINICAL SUPPORT (OUTPATIENT)
Dept: FAMILY MEDICINE | Facility: CLINIC | Age: 69
End: 2024-09-30
Payer: COMMERCIAL

## 2024-09-30 VITALS
OXYGEN SATURATION: 98 % | HEIGHT: 65 IN | BODY MASS INDEX: 35.35 KG/M2 | SYSTOLIC BLOOD PRESSURE: 124 MMHG | HEART RATE: 74 BPM | WEIGHT: 212.19 LBS | DIASTOLIC BLOOD PRESSURE: 82 MMHG

## 2024-09-30 DIAGNOSIS — R05.3 CHRONIC COUGH: ICD-10-CM

## 2024-09-30 DIAGNOSIS — M67.911 ROTATOR CUFF DISORDER, RIGHT: ICD-10-CM

## 2024-09-30 DIAGNOSIS — I10 ESSENTIAL HYPERTENSION: ICD-10-CM

## 2024-09-30 DIAGNOSIS — G89.29 CHRONIC PAIN OF RIGHT ANKLE: ICD-10-CM

## 2024-09-30 DIAGNOSIS — M25.571 CHRONIC PAIN OF RIGHT ANKLE: ICD-10-CM

## 2024-09-30 DIAGNOSIS — N52.9 ERECTILE DYSFUNCTION, UNSPECIFIED ERECTILE DYSFUNCTION TYPE: ICD-10-CM

## 2024-09-30 DIAGNOSIS — E78.5 DYSLIPIDEMIA: ICD-10-CM

## 2024-09-30 DIAGNOSIS — Z12.5 SCREENING FOR PROSTATE CANCER: ICD-10-CM

## 2024-09-30 DIAGNOSIS — E66.01 SEVERE OBESITY (BMI 35.0-39.9) WITH COMORBIDITY: Primary | ICD-10-CM

## 2024-09-30 PROBLEM — M54.50 LUMBAR PAIN: Status: RESOLVED | Noted: 2024-01-22 | Resolved: 2024-09-30

## 2024-09-30 LAB
ALBUMIN SERPL BCP-MCNC: 3.9 G/DL (ref 3.5–5.2)
ALP SERPL-CCNC: 50 U/L (ref 55–135)
ALT SERPL W/O P-5'-P-CCNC: 43 U/L (ref 10–44)
ANION GAP SERPL CALC-SCNC: 8 MMOL/L (ref 8–16)
AST SERPL-CCNC: 39 U/L (ref 10–40)
BASOPHILS # BLD AUTO: 0.04 K/UL (ref 0–0.2)
BASOPHILS NFR BLD: 0.5 % (ref 0–1.9)
BILIRUB SERPL-MCNC: 0.3 MG/DL (ref 0.1–1)
BUN SERPL-MCNC: 22 MG/DL (ref 8–23)
CALCIUM SERPL-MCNC: 9.6 MG/DL (ref 8.7–10.5)
CHLORIDE SERPL-SCNC: 104 MMOL/L (ref 95–110)
CHOLEST SERPL-MCNC: 162 MG/DL (ref 120–199)
CHOLEST/HDLC SERPL: 3.4 {RATIO} (ref 2–5)
CO2 SERPL-SCNC: 26 MMOL/L (ref 23–29)
COMPLEXED PSA SERPL-MCNC: 1.3 NG/ML (ref 0–4)
CREAT SERPL-MCNC: 1.1 MG/DL (ref 0.5–1.4)
DIFFERENTIAL METHOD BLD: ABNORMAL
EOSINOPHIL # BLD AUTO: 0.5 K/UL (ref 0–0.5)
EOSINOPHIL NFR BLD: 5.7 % (ref 0–8)
ERYTHROCYTE [DISTWIDTH] IN BLOOD BY AUTOMATED COUNT: 13.4 % (ref 11.5–14.5)
EST. GFR  (NO RACE VARIABLE): >60 ML/MIN/1.73 M^2
GLUCOSE SERPL-MCNC: 119 MG/DL (ref 70–110)
HCT VFR BLD AUTO: 44.5 % (ref 40–54)
HDLC SERPL-MCNC: 48 MG/DL (ref 40–75)
HDLC SERPL: 29.6 % (ref 20–50)
HGB BLD-MCNC: 15.5 G/DL (ref 14–18)
IMM GRANULOCYTES # BLD AUTO: 0.04 K/UL (ref 0–0.04)
IMM GRANULOCYTES NFR BLD AUTO: 0.5 % (ref 0–0.5)
LDLC SERPL CALC-MCNC: 95.8 MG/DL (ref 63–159)
LYMPHOCYTES # BLD AUTO: 3.3 K/UL (ref 1–4.8)
LYMPHOCYTES NFR BLD: 41.9 % (ref 18–48)
MCH RBC QN AUTO: 33.3 PG (ref 27–31)
MCHC RBC AUTO-ENTMCNC: 34.8 G/DL (ref 32–36)
MCV RBC AUTO: 96 FL (ref 82–98)
MONOCYTES # BLD AUTO: 0.8 K/UL (ref 0.3–1)
MONOCYTES NFR BLD: 10.2 % (ref 4–15)
NEUTROPHILS # BLD AUTO: 3.3 K/UL (ref 1.8–7.7)
NEUTROPHILS NFR BLD: 41.2 % (ref 38–73)
NONHDLC SERPL-MCNC: 114 MG/DL
NRBC BLD-RTO: 0 /100 WBC
PLATELET # BLD AUTO: 229 K/UL (ref 150–450)
PMV BLD AUTO: 10.5 FL (ref 9.2–12.9)
POTASSIUM SERPL-SCNC: 4.5 MMOL/L (ref 3.5–5.1)
PROT SERPL-MCNC: 7.7 G/DL (ref 6–8.4)
RBC # BLD AUTO: 4.65 M/UL (ref 4.6–6.2)
SODIUM SERPL-SCNC: 138 MMOL/L (ref 136–145)
TRIGL SERPL-MCNC: 91 MG/DL (ref 30–150)
TSH SERPL DL<=0.005 MIU/L-ACNC: 1.5 UIU/ML (ref 0.4–4)
WBC # BLD AUTO: 7.88 K/UL (ref 3.9–12.7)

## 2024-09-30 PROCEDURE — 80061 LIPID PANEL: CPT | Performed by: FAMILY MEDICINE

## 2024-09-30 PROCEDURE — 3074F SYST BP LT 130 MM HG: CPT | Mod: CPTII,S$GLB,, | Performed by: FAMILY MEDICINE

## 2024-09-30 PROCEDURE — 80053 COMPREHEN METABOLIC PANEL: CPT | Performed by: FAMILY MEDICINE

## 2024-09-30 PROCEDURE — 3288F FALL RISK ASSESSMENT DOCD: CPT | Mod: CPTII,S$GLB,, | Performed by: FAMILY MEDICINE

## 2024-09-30 PROCEDURE — 85025 COMPLETE CBC W/AUTO DIFF WBC: CPT | Performed by: FAMILY MEDICINE

## 2024-09-30 PROCEDURE — 99999 PR PBB SHADOW E&M-EST. PATIENT-LVL IV: CPT | Mod: PBBFAC,,, | Performed by: FAMILY MEDICINE

## 2024-09-30 PROCEDURE — 36415 COLL VENOUS BLD VENIPUNCTURE: CPT | Mod: S$GLB,,, | Performed by: FAMILY MEDICINE

## 2024-09-30 PROCEDURE — 84153 ASSAY OF PSA TOTAL: CPT | Performed by: FAMILY MEDICINE

## 2024-09-30 PROCEDURE — 1159F MED LIST DOCD IN RCRD: CPT | Mod: CPTII,S$GLB,, | Performed by: FAMILY MEDICINE

## 2024-09-30 PROCEDURE — 99214 OFFICE O/P EST MOD 30 MIN: CPT | Mod: S$GLB,,, | Performed by: FAMILY MEDICINE

## 2024-09-30 PROCEDURE — 3079F DIAST BP 80-89 MM HG: CPT | Mod: CPTII,S$GLB,, | Performed by: FAMILY MEDICINE

## 2024-09-30 PROCEDURE — 4010F ACE/ARB THERAPY RXD/TAKEN: CPT | Mod: CPTII,S$GLB,, | Performed by: FAMILY MEDICINE

## 2024-09-30 PROCEDURE — 3008F BODY MASS INDEX DOCD: CPT | Mod: CPTII,S$GLB,, | Performed by: FAMILY MEDICINE

## 2024-09-30 PROCEDURE — 84443 ASSAY THYROID STIM HORMONE: CPT | Performed by: FAMILY MEDICINE

## 2024-09-30 PROCEDURE — 1125F AMNT PAIN NOTED PAIN PRSNT: CPT | Mod: CPTII,S$GLB,, | Performed by: FAMILY MEDICINE

## 2024-09-30 PROCEDURE — G2211 COMPLEX E/M VISIT ADD ON: HCPCS | Mod: S$GLB,,, | Performed by: FAMILY MEDICINE

## 2024-09-30 PROCEDURE — 1101F PT FALLS ASSESS-DOCD LE1/YR: CPT | Mod: CPTII,S$GLB,, | Performed by: FAMILY MEDICINE

## 2024-09-30 RX ORDER — OLMESARTAN MEDOXOMIL 40 MG/1
40 TABLET ORAL DAILY
Qty: 30 TABLET | Refills: 5 | Status: SHIPPED | OUTPATIENT
Start: 2024-09-30 | End: 2025-09-30

## 2024-09-30 RX ORDER — SILDENAFIL CITRATE 20 MG/1
TABLET ORAL
Qty: 30 TABLET | Refills: 5 | Status: CANCELLED | OUTPATIENT
Start: 2024-09-30

## 2024-09-30 RX ORDER — SILDENAFIL 100 MG/1
100 TABLET, FILM COATED ORAL DAILY PRN
Qty: 6 TABLET | Refills: 5 | Status: SHIPPED | OUTPATIENT
Start: 2024-09-30 | End: 2025-09-30

## 2024-09-30 RX ORDER — PANTOPRAZOLE SODIUM 40 MG/1
40 TABLET, DELAYED RELEASE ORAL DAILY
Qty: 30 TABLET | Refills: 5 | Status: SHIPPED | OUTPATIENT
Start: 2024-09-30

## 2024-09-30 RX ORDER — ATORVASTATIN CALCIUM 20 MG/1
20 TABLET, FILM COATED ORAL NIGHTLY
Qty: 90 TABLET | Refills: 2 | Status: SHIPPED | OUTPATIENT
Start: 2024-09-30

## 2024-09-30 NOTE — PROGRESS NOTES
Subjective:       Patient ID: Sal Rowley is a 69 y.o. male.    Chief Complaint: Follow-up (Pt here for 6 mth f/u. )    Pt is a 69 y.o. male who presents for evaluation and management of   Encounter Diagnoses   Name Primary?    Essential hypertension     Chronic cough     Erectile dysfunction, unspecified erectile dysfunction type     Severe obesity (BMI 35.0-39.9) with comorbidity Yes    Dyslipidemia     Screening for prostate cancer     Rotator cuff disorder, right    .  Doing well on current meds. Denies any side effects. Prevention is up to date.  Review of Systems   Constitutional:  Negative for fever.   Respiratory:  Negative for shortness of breath.    Cardiovascular:  Negative for chest pain.   Gastrointestinal:  Negative for anal bleeding and blood in stool.   Genitourinary:  Negative for dysuria.   Musculoskeletal:  Positive for arthralgias (right shoulder pain) and back pain.   Neurological:  Negative for dizziness and light-headedness.       Objective:      Physical Exam  Constitutional:       Appearance: Normal appearance. He is well-developed. He is not ill-appearing.   HENT:      Head: Normocephalic and atraumatic.      Right Ear: External ear normal.      Left Ear: External ear normal.      Nose: Nose normal.      Mouth/Throat:      Mouth: Mucous membranes are moist.      Pharynx: No oropharyngeal exudate or posterior oropharyngeal erythema.   Eyes:      General: No scleral icterus.        Right eye: No discharge.         Left eye: No discharge.      Conjunctiva/sclera: Conjunctivae normal.      Pupils: Pupils are equal, round, and reactive to light.   Neck:      Thyroid: No thyromegaly.      Vascular: No JVD.      Trachea: No tracheal deviation.   Cardiovascular:      Rate and Rhythm: Normal rate and regular rhythm.      Heart sounds: Normal heart sounds. No murmur heard.  Pulmonary:      Effort: Pulmonary effort is normal. No respiratory distress.      Breath sounds: Normal breath sounds.  No wheezing or rales.   Chest:      Chest wall: No tenderness.   Abdominal:      General: Bowel sounds are normal. There is no distension.      Palpations: Abdomen is soft. There is no mass.      Tenderness: There is no abdominal tenderness. There is no guarding or rebound.   Musculoskeletal:         General: Normal range of motion.      Cervical back: Normal range of motion and neck supple.      Right lower leg: No edema.      Left lower leg: No edema.      Comments: Positive  impingement signs 1 and 2  3/5 supraspinatous and pain   5/5 subscapularis  3/5 teres minor and infraspinatous       Lymphadenopathy:      Cervical: No cervical adenopathy.   Skin:     General: Skin is warm and dry.   Neurological:      Mental Status: He is alert and oriented to person, place, and time.      Cranial Nerves: No cranial nerve deficit.      Motor: No abnormal muscle tone.      Coordination: Coordination normal.      Deep Tendon Reflexes: Reflexes are normal and symmetric. Reflexes normal.   Psychiatric:         Behavior: Behavior normal.         Thought Content: Thought content normal.         Judgment: Judgment normal.         Assessment:       1. Severe obesity (BMI 35.0-39.9) with comorbidity    2. Essential hypertension    3. Chronic cough    4. Erectile dysfunction, unspecified erectile dysfunction type    5. Dyslipidemia    6. Screening for prostate cancer    7. Rotator cuff disorder, right        Plan:   1. Severe obesity (BMI 35.0-39.9) with comorbidity  Overview:  The patient is asked to make an attempt to improve diet and exercise patterns to aid in medical management of this problem.  Cut out white carbs: bread, rice, pasta, potatoes. Exercise/walk 5x/week for at least 30 minutes  .        2. Essential hypertension  -     olmesartan (BENICAR) 40 MG tablet; Take 1 tablet (40 mg total) by mouth once daily.  Dispense: 30 tablet; Refill: 5  -     CBC Auto Differential; Future; Expected date: 09/30/2024  -      Comprehensive Metabolic Panel; Future; Expected date: 09/30/2024  -     Lipid Panel; Future; Expected date: 09/30/2024  -     TSH; Future; Expected date: 09/30/2024    3. Chronic cough  Overview:  Remote h/o smoking. Quit almost 30 years ago   His previous PCP put him on Trellegy which has helped some with the cough. He called him COPD, per pt.   His PFT's from 1/2023 show no obstructive pattern. He has moderate restriction.   Trial of PPI 6/2023 has helped some        Orders:  -     pantoprazole (PROTONIX) 40 MG tablet; Take 1 tablet (40 mg total) by mouth once daily.  Dispense: 30 tablet; Refill: 5    4. Erectile dysfunction, unspecified erectile dysfunction type  -     sildenafiL (VIAGRA) 100 MG tablet; Take 1 tablet (100 mg total) by mouth daily as needed for Erectile Dysfunction.  Dispense: 6 tablet; Refill: 5    5. Dyslipidemia  -     atorvastatin (LIPITOR) 20 MG tablet; Take 1 tablet (20 mg total) by mouth every evening.  Dispense: 90 tablet; Refill: 2    6. Screening for prostate cancer  -     PSA, Screening; Future; Expected date: 09/30/2024    7. Rotator cuff disorder, right  -     Ambulatory referral/consult to Physical/Occupational Therapy; Future; Expected date: 10/07/2024      No follow-ups on file.

## 2024-10-21 ENCOUNTER — CLINICAL SUPPORT (OUTPATIENT)
Dept: REHABILITATION | Facility: HOSPITAL | Age: 69
End: 2024-10-21
Attending: FAMILY MEDICINE
Payer: COMMERCIAL

## 2024-10-21 DIAGNOSIS — G89.29 CHRONIC PAIN OF RIGHT ANKLE: ICD-10-CM

## 2024-10-21 DIAGNOSIS — M72.2 PLANTAR FASCIITIS: Primary | ICD-10-CM

## 2024-10-21 DIAGNOSIS — M25.571 CHRONIC PAIN OF RIGHT ANKLE: ICD-10-CM

## 2024-10-21 PROCEDURE — 97112 NEUROMUSCULAR REEDUCATION: CPT | Mod: PN

## 2024-10-21 PROCEDURE — 97161 PT EVAL LOW COMPLEX 20 MIN: CPT | Mod: PN

## 2024-10-21 PROCEDURE — 97110 THERAPEUTIC EXERCISES: CPT | Mod: PN

## 2024-10-21 NOTE — PLAN OF CARE
[facility]   Physical Therapy Initial Evaluation     Date: 10/21/2024   Name: Sal Rowley  Clinic Number: [id number]    Therapy Diagnosis:   Encounter Diagnoses   Name Primary?    Chronic pain of right ankle     Plantar fasciitis Yes     Physician: Justice Mccarty MD    Physician Orders: PT Eval and Treat   Medical Diagnosis from Referral:      Diagnosis   M25.571,G89.29 (ICD-10-CM) - Chronic pain of right ankle      Evaluation Date: 10/21/2024  Authorization Period Expiration: 12/31/2024  Plan of Care Expiration: 12/2/2024  Progress Note Due: visit 4  Visit # / Visits authorized: 1/ 1   FOTO: 1/3    Precautions: Standard     Time In: 0800  Time Out: 0840  Total Appointment Time (timed & untimed codes): 40 minutes      SUBJECTIVE     Date of onset: right foot pain started a few months ago    History of current condition - Sal reports: that he has had foot pain for the about a year now. He reports multiple instances of stubbing his toe or foot on the left LE. The pain feels like his right ankle is sprained. Patient reports that he had an injection in his foot done at urgent care which helped with his pain but only temporarily. He does have orthotics but currently is not wearing them because he feels that they do not help with his pain. He did have an x-ray of his foot done at urgent care.     Falls: over a year ago    Imaging, bone scan films: unable to obtain    Prior Therapy: P.T. for back  Social History:  lives with their spouse  Occupation:   Prior Level of Function: Independent without difficulty  Current Level of Function: Independent with difficulty and pain    Pain:  Current 0/10, worst 5/10, best 0/10   Location: right feet    Description: Sharp and Shooting  Aggravating Factors: movement  Easing Factors:  injection    Patients goals: Patient to return to prior level of function without pain.     Medical History:   Past Medical History:   Diagnosis  Date    Hypertension     Mixed hyperlipidemia     Osteoarthritis        Surgical History:   Sal Rowley  has a past surgical history that includes Knee arthroscopy; Tonsillectomy; Rotator cuff repair (Right); Carpal tunnel release (Left, 3/27/2023); Carpal tunnel release (Right, 4/10/2023); Colonoscopy (N/A, 7/25/2023); Epidural steroid injection into lumbar spine (N/A, 4/12/2024); and Transforaminal epidural injection of steroid (Right, 8/23/2024).    Medications:   Sal has a current medication list which includes the following prescription(s): atorvastatin, niacin, olmesartan, pantoprazole, sildenafil, and sildenafil.    Allergies:   Review of patient's allergies indicates:  No Known Allergies     OBJECTIVE     ACTIVE RANGE OF MOTION    LEFT RIGHT   Dorsiflexion (gastroc) WNL WNL   Plantarflexion WNL WNL   Inversion WNL WNL   Eversion WNL WNL     LOWER EXTREMITY STRENGTH    Left Right     Ankle Dorsiflexion 4/5 4-/5   Ankle Plantarflexion (single heel raise) 4 2   Ankle Inversion 4/5 4-/5   Ankle Eversion 4/5 4-/5       DERMATOMES: Sensation: Light Touch: Intact  MYOTOMES: WNL    SPECIAL TESTS    LEFT RIGHT   Talar Tilt/Inversion Stress Tests (CFL)  +     PALPATION:  Patient reports primary pain region along medial aspect of foot with collapsed arch on right foot.    GAIT: Sal ambulates with no assistive device with independently.     GAIT DEVIATIONS: aSl displays  moderate antalgic gait    Squat Mechanics: Bilateral knee valgus, anterior tibial translation, decreased depth of squat    Limitation/Restriction for FOTO Foot Survey    Therapist reviewed FOTO scores for Sal Rowley on 10/21/2024.   FOTO documents entered into [employer] - see Media section.    Limitation Score: 51%       TREATMENT     Total Treatment time (time-based codes) separate from Evaluation: 25 minutes      Sal received the treatments listed below:      therapeutic exercises to develop strength and flexibility for 0 minutes  including:  -rockerboard 3 min ea DF/PF, Inv/Ev  -towel curls 3 min  -gastroc towel stretch 10 x 10 seconds  -ankle red theraband 4 way 2 x 10 ea way    manual therapy techniques: Joint mobilizations were applied to the: right foot for minutes, including:      neuromuscular re-education activities to improve:  for  minutes. The following activities were included:      therapeutic activities to improve functional performance for   minutes, including:    hot pack for 0 minutes to .    cold pack for  minutes to .      PATIENT EDUCATION AND HOME EXERCISES     Education provided:   - Patient was instructed on HEP  - Pt/family was provided educational information, including: role of PT, goals for PT, scheduling - pt verbalized understanding. Discussed insurance limitations with pt.     Written Home Exercises Provided: yes. Exercises were reviewed and Sal was able to demonstrate them prior to the end of the session.  Sal demonstrated good  understanding of the education provided. See EMR under Patient Instructions for exercises provided during therapy sessions.    ASSESSMENT     Sal is a 69 y.o. male referred to outpatient Physical Therapy with a medical diagnosis of   Diagnosis   M72.2 (ICD-10-CM) - Plantar fasciitis   . Patient presents with decreased ankle strength, increased point tenderness and decreased flexibility in right ankle. Patient would benefit from skilled physical therapy to address the above mentioned deficits.    Medical necessity is demonstrated by the following IMPAIRMENTS/PROBLEMS:  -Decreased function (LEFS)  -Decreased pain free ankle AROM ()  -Decreased ankle stability ()  -Decreased hip stability ()  -Decreased participation in regular exercise routine  -Increased pain (NPS)  -(+) TTP    Intervention strategies designed to address these impairments will be instrumental in achieving the stated functional goals, including her ability to safely perform mobility tasks. Based on the examination, the  patient's rehabilitation potential to achieve functional goals is good.    Patient prognosis is Good.   Patient will benefit from skilled outpatient Physical Therapy to address the deficits stated above and in the chart below, provide patient /family education, and to maximize patientt's level of independence.     Plan of care discussed with patient: Yes  Patient's spiritual, cultural and educational needs considered and patient is agreeable to the plan of care and goals as stated below:     Anticipated Barriers for therapy: none    Medical Necessity is demonstrated by the following  History  Co-morbidities and personal factors that may impact the plan of care Co-morbidities:   HTN    Personal Factors:   no deficits     low   Examination  Body Structures and Functions, activity limitations and participation restrictions that may impact the plan of care Body Regions:   lower extremities    Body Systems:    gross symmetry  ROM  strength           low   Clinical Presentation stable and uncomplicated low   Decision Making/ Complexity Score: low     Goals:  Short Term Goals: 2 weeks   1. Patient demonstrates independence with HEP.   2. Patient with min to nil point tenderness in plantar fascia.     Long Term Goals: 4 weeks   1. Patient demonstrates increased flexibility in right gastroc to improve tolerance to functional activities.   2. Patient demonstrates increased strength BLE's to 4/5 or greater to improve tolerance to functional activities.   3. Patient demonstrates improved overall function per LEFS to 40% or more.     PLAN   Plan of care Certification: 10/21/2024 to 12/2/2024.    Outpatient Physical Therapy 1 times weekly for 4 weeks to include the following interventions: Electrical Stimulation  , Manual Therapy, Moist Heat/ Ice, Neuromuscular Re-ed, Patient Education, Therapeutic Activities, and Therapeutic Exercise.     Rekha Carranza, PT    Pt may be seen by PTA as part of the rehabilitation team.     Therapist:  Rekha Carranza, PT  10/21/2024    I CERTIFY THE NEED FOR THESE SERVICES FURNISHED UNDER THIS PLAN OF TREATMENT AND WHILE UNDER MY CARE   Physician's comments:     Physician's Signature: ___________________________________________________

## 2024-10-21 NOTE — Clinical Note
Good morning, I spoke to Mr. Huang about possibly getting a referral to a podiatrist at this point secondary to having tried physical therapy in April for the exact same issue. We will do some therapy in the mean time.  Thanks, Renetta

## 2024-10-28 ENCOUNTER — CLINICAL SUPPORT (OUTPATIENT)
Dept: REHABILITATION | Facility: HOSPITAL | Age: 69
End: 2024-10-28
Payer: COMMERCIAL

## 2024-10-28 DIAGNOSIS — G89.29 CHRONIC PAIN OF RIGHT ANKLE: Primary | ICD-10-CM

## 2024-10-28 DIAGNOSIS — M25.571 CHRONIC PAIN OF RIGHT ANKLE: Primary | ICD-10-CM

## 2024-10-28 PROCEDURE — 97110 THERAPEUTIC EXERCISES: CPT | Mod: PN

## 2024-10-28 PROCEDURE — 97112 NEUROMUSCULAR REEDUCATION: CPT | Mod: PN

## 2024-11-08 ENCOUNTER — CLINICAL SUPPORT (OUTPATIENT)
Dept: REHABILITATION | Facility: HOSPITAL | Age: 69
End: 2024-11-08
Payer: COMMERCIAL

## 2024-11-08 ENCOUNTER — CLINICAL SUPPORT (OUTPATIENT)
Dept: REHABILITATION | Facility: HOSPITAL | Age: 69
End: 2024-11-08
Attending: FAMILY MEDICINE
Payer: COMMERCIAL

## 2024-11-08 DIAGNOSIS — G89.29 CHRONIC PAIN OF RIGHT ANKLE: Primary | ICD-10-CM

## 2024-11-08 DIAGNOSIS — M67.911 ROTATOR CUFF DISORDER, RIGHT: ICD-10-CM

## 2024-11-08 DIAGNOSIS — M25.571 CHRONIC PAIN OF RIGHT ANKLE: Primary | ICD-10-CM

## 2024-11-08 DIAGNOSIS — M72.2 PLANTAR FASCIITIS: ICD-10-CM

## 2024-11-08 PROCEDURE — 97110 THERAPEUTIC EXERCISES: CPT | Mod: PN

## 2024-11-08 PROCEDURE — 97112 NEUROMUSCULAR REEDUCATION: CPT | Mod: PN

## 2024-11-08 PROCEDURE — 97165 OT EVAL LOW COMPLEX 30 MIN: CPT | Mod: PN

## 2024-11-08 NOTE — PROGRESS NOTES
[facility]   Physical Therapy Treatment Note      Name: Sal Rowley  Clinic Number: [id number]    Therapy Diagnosis:   No diagnosis found.    Physician: Justice Mccarty MD    Visit Date: 11/8/2024        Diagnosis   M25.571,G89.29 (ICD-10-CM) - Chronic pain of right ankle      Evaluation Date: 10/21/2024  Authorization Period Expiration: 12/31/2024  Plan of Care Expiration: 12/2/2024  Progress Note Due: visit 4  Visit # / Visits authorized: 3/6   FOTO: 1/3     Precautions: Standard      Time In: 1015  Time Out: 1055  Total Appointment Time (timed & untimed codes): 40 minutes    PTA Visit #: -/5       Subjective     Patient reports: he was feeling better with the exercises..  He was compliant with home exercise program.  Response to previous treatment: positive  Functional change: minimal    Pain: 5/10  Location: right feet      Objective      Objective Measures updated at progress report unless specified.     Treatment     Sal received the treatments listed below:      therapeutic exercises to develop strength, endurance, ROM, and flexibility for 25 minutes including:  -rockerboard 3 min ea DF/PF, Inv/Ev  -towel curls 3 min  -gastroc towel stretch 10 x 10 seconds  -ankle red theraband 4 way 2 x 10 ea way  -standing gastroc slantboard 2 x 1 min    manual therapy techniques:  were applied to the:  for  minutes, including:      neuromuscular re-education activities to improve:  for  minutes. The following activities were included:  -recumbent bike 5 min level 4  -heel raises 2 x 10  -2 x 30 seconds SLS green lazarus pad      hot pack for  minutes to .    cold pack for  minutes to .    Patient Education and Home Exercises       Education provided:   - Cont HEP    Written Home Exercises Provided: Patient instructed to cont prior HEP. Exercises were reviewed and Sal was able to demonstrate them prior to the end of the session.  Sal demonstrated good  understanding of the education  provided. See Electronic Medical Record under Patient Instructions for exercises provided during therapy sessions    Assessment     Patient tolerated today's treatment well with no increased reports of pain or discomfort.    Sal Is progressing well towards his goals.   Patient prognosis is Good.     Patient will continue to benefit from skilled outpatient physical therapy to address the deficits listed in the problem list box on initial evaluation, provide pt/family education and to maximize pt's level of independence in the home and community environment.     Patient's spiritual, cultural and educational needs considered and pt agreeable to plan of care and goals.     Anticipated barriers to physical therapy:     Goals: Short Term Goals: 2 weeks   1. Patient demonstrates independence with HEP.   2. Patient with min to nil point tenderness in plantar fascia.      Long Term Goals: 4 weeks   1. Patient demonstrates increased flexibility in right gastroc to improve tolerance to functional activities.   2. Patient demonstrates increased strength BLE's to 4/5 or greater to improve tolerance to functional activities.   3. Patient demonstrates improved overall function per LEFS to 40% or more.      PLAN   Plan of care Certification: 10/21/2024 to 12/2/2024.     Outpatient Physical Therapy 1 times weekly for 4 weeks to include the following interventions: Electrical Stimulation  , Manual Therapy, Moist Heat/ Ice, Neuromuscular Re-ed, Patient Education, Therapeutic Activities, and Therapeutic Exercise.      Rekha Carranza, PT      Rekha Carranza, PT

## 2024-11-08 NOTE — PLAN OF CARE
[facility]  Occupational Therapy Initial Evaluation    Date: 11/8/2024  Name: Sal Rowley  Clinic Number: [id number]    Therapy Diagnosis:   Encounter Diagnosis   Name Primary?    Rotator cuff disorder, right      Physician: Justice Mccarty MD    Physician Orders: Eval and treat  Medical Diagnosis: (R) rotator cuff syndrome/dysfunction  Surgical Procedure and Date: n/a  Evaluation Date: 11/8/2024  Insurance Authorization Period Expiration: 9/30/2024 - 12/31/2024  Plan of Care Certification Period: 11/8/2024 - 1/8/2024  Progress Note Due: 12/8/2024   Date of Return to MD: n/a    Visit # / Visits authorized: otf  FOTO: 1/3    Precautions:  none    Time In:11:00 am  Time Out: 12:00 am  Total Appointment Time (timed & untimed codes): 60 minutes    SUBJECTIVE     Date of Onset:  approx 8-9 months ago    History of Current Condition/Mechanism of Injury: Sal reports:   Pt has had previous surgery for (R) rotator cuff repair and was not sent to therapy post  operatively.  Since he has developed onset of pain and joint stiff over past 8 months with a progressive worsening of symptoms. He has been referred to OT.  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     Current Functional Status   Home/Living environment: lives with their spouse      Limitation of Functional Status as follows:   ADLs/IADLs:     - Feeding: (I)    - Bathing: (I)    - Dressing/Grooming: (I)    - Driving: (I)     Leisure:     Pain:  Functional Pain Scale Rating 0-10:     Rest 1/10,   With movement current 1/10, postional  worst 7/10     Location: (R) anterior capsule of shoulder  Description: Sharp  Aggravating Factors: Night  "Time and Lifting  Easing Factors: nothing    Patient's Goals for Therapy: "to get rid of pain to move it better and to better understand what's going on in it" per pt    Medical History:   Past Medical History:   Diagnosis Date    Hypertension     Mixed hyperlipidemia     Osteoarthritis        Surgical History:    has a past surgical history that includes Knee arthroscopy; Tonsillectomy; Rotator cuff repair (Right); Carpal tunnel release (Left, 3/27/2023); Carpal tunnel release (Right, 4/10/2023); Colonoscopy (N/A, 7/25/2023); Epidural steroid injection into lumbar spine (N/A, 4/12/2024); and Transforaminal epidural injection of steroid (Right, 8/23/2024).    Medications:   has a current medication list which includes the following prescription(s): atorvastatin, niacin, olmesartan, pantoprazole, sildenafil, and sildenafil.    Allergies:   Review of patient's allergies indicates:  No Known Allergies       OBJECTIVE     Scapular function and position:   scapular mobility limited with some adhesion present and palpable. Scapular instability with winging noted      Active Range of Motion:  (Measured in degrees)   Right   Shld Flexion 140   Shld Extension 75   Shld Abduction 160   Shld Adduction Pain horizontally   Shld Ext Rotation 35   Shld Int Rotation "Tightness but functional   Elbow Flexion wfl   Elbow Extension wfl       Passive Range of Motion: soft tend feels on mobility with full range achieved but painful      Manual Muscle Test:  deferred due to pain limiting accurate testing.  Will assess once pain reduces   Right   Shld Flexion    Shld Extension    Shld Abduction    Shld Adduction    Shld Ext Rotation    Shld Int Rotation    Elbow Flexion    Elbow Extension    Elbow Supination    Elbow Pronation         Strength (Dynamometer/Measured in pounds on Rung II) and Pinch Strength (Pinch Gauge)   11/8/2024 11/8/2024    Right Left   Composite 45 48   Lateral Pinch 12 12   Tripod Pinch 18           12 "       Opposition (Fine Motor Skills/Dexterity):  no deficits     Wound/Scar management: n/a    Sensation: no deficits today however pt stated occasional tingling    Edema: mild anterior capsule        Limitation/Restriction for FOTO  Survey    Therapist reviewed FOTO scores for Sal Rowley on 11/8/2024.   FOTO documents entered into [employer] - see Media section.    Limitation Score: see media                 Patient Education and Home Exercises      Education provided:   - scapular mobility and gliding exercises, pain management    Written Home Exercises Provided: yes.  Exercises were reviewed and Sal was able to demonstrate them prior to the end of the session.  Sal demonstrated good  understanding of the education provided. See EMR under Patient Instructions for exercises provided during therapy sessions.     Pt was advised to perform these exercises free of pain, and to stop performing them if pain occurs.    Patient/Family Education: role of OT, goals for OT, scheduling/cancellations - pt verbalized understanding. Discussed insurance limitations with patient.      ASSESSMENT     Sal Rowley is a 69 y.o. male referred to outpatient occupational therapy and presents with a medical diagnosis of (R) RTC dysfunction.  Patient presents with the following therapy deficits: Decreased ROM, Decreased  strength, Decreased pinch strength, Decreased muscle strength, Decreased functional hand use, Increased pain, and Joint Stiffness and demonstrates limitations as described in the chart below. Following medical record review it is determined that pt will benefit from occupational therapy services in order to maximize pain free and/or functional use of right shoulder. The following goals were discussed with the patient and patient is in agreement with them as to be addressed in the treatment plan. The patient's rehab potential is Good.     Anticipated barriers to occupational therapy: none  Pt has no  cultural, educational or language barriers to learning provided.    Profile and History Assessment of Occupational Performance Level of Clinical Decision Making Complexity Score   Occupational Profile:   Sal Rowley is a 69 y.o. male who lives with their spouse and is currently employed Sal Rowley has difficulty with  ADLs and IADLs as listed previously, which  Affecting hisdaily functional abilities.      Comorbidities:    has a past medical history of Hypertension, Mixed hyperlipidemia, and Osteoarthritis.    Medical and Therapy History Review:   Brief               Performance Deficits    Physical:  Joint Mobility  Joint Stability  Muscle Power/Strength  Muscle Endurance  Edema   Strength  Pinch Strength  Postural Control  Pain  Scapular dysfunction    Cognitive:  No Deficits    Psychosocial:    No Deficits     Clinical Decision Making:  low    Assessment Process:  Problem-Focused Assessments    Modification/Need for Assistance:  Not Necessary    Intervention Selection:  Several Treatment Options       low  Based on PMHX, co morbidities , data from assessments and functional level of assistance required with task and clinical presentation directly impacting function.       The following goals were discussed with the patient and patient is in agreement with them as to be addressed in the treatment plan.       Goals:   The following goals were discussed with the patient and patient is in agreement with them as to be addressed in the treatment plan.   Long Term Goals (LTGs); to be met by discharge.  LTG #1: Pt will report a pain level of 2 out of 10 with functional use of (R)UE   LTG #2: Pt will demo improved FOTO score by 20 points.   LTG #3: Pt will return to prior level of function for work related activities with use of (R)UE.   LTG #4: Pt will demonstrate increased ROM WFL in order increase functional use of UE  LTG #5: Pt will increase shoulder MMT within WFL in order to improve functional  use of UE.  LTG #6: Pt will demonstrate increased  strength of 15#s in order to increase functional use of UE.  LTG #7: Pt will demonstrate independence with issued HEP.   LTG #8: Pt will display improved scapulothoracic gliding to improve overhead functional use of (R)UE      PLAN   Plan of Care Certification: 11/8/2024 to 1/8/2025.     Outpatient Occupational Therapy 2 times weekly for 8 weeks to include the following interventions: Manual therapy/joint mobilizations, Modalities for pain management, US 3 mhz, Therapeutic exercises/activities., Strengthening, Joint Protection, and scapular stability.      Becca Membreno, OT      I CERTIFY THE NEED FOR THESE SERVICES FURNISHED UNDER THIS PLAN OF TREATMENT AND WHILE UNDER MY CARE  Physician's comments:      Physician's Signature: ___________________________________________________

## 2024-11-11 ENCOUNTER — CLINICAL SUPPORT (OUTPATIENT)
Dept: REHABILITATION | Facility: HOSPITAL | Age: 69
End: 2024-11-11
Payer: COMMERCIAL

## 2024-11-11 DIAGNOSIS — G89.29 CHRONIC PAIN OF RIGHT ANKLE: Primary | ICD-10-CM

## 2024-11-11 DIAGNOSIS — M25.571 CHRONIC PAIN OF RIGHT ANKLE: Primary | ICD-10-CM

## 2024-11-11 DIAGNOSIS — M72.2 PLANTAR FASCIITIS: ICD-10-CM

## 2024-11-11 PROCEDURE — 97112 NEUROMUSCULAR REEDUCATION: CPT | Mod: PN

## 2024-11-11 PROCEDURE — 97110 THERAPEUTIC EXERCISES: CPT | Mod: PN

## 2024-11-11 NOTE — PROGRESS NOTES
[facility]   Physical Therapy Treatment Note      Name: Sal Rowley  Clinic Number: [id number]    Therapy Diagnosis:   Encounter Diagnoses   Name Primary?    Chronic pain of right ankle Yes    Plantar fasciitis        Physician: Justice Mccarty MD    Visit Date: 11/11/2024        Diagnosis   M25.571,G89.29 (ICD-10-CM) - Chronic pain of right ankle      Evaluation Date: 10/21/2024  Authorization Period Expiration: 12/31/2024  Plan of Care Expiration: 12/2/2024  Progress Note Due: visit 4  Visit # / Visits authorized: 4/6   FOTO: 1/3     Precautions: Standard      Time In: 0845  Time Out: 0925  Total Appointment Time (timed & untimed codes): 40 minutes    PTA Visit #: -/5       Subjective     Patient reports: he is having more pain today.  He was compliant with home exercise program.  Response to previous treatment: positive  Functional change: minimal    Pain: 6/10  Location: right feet      Objective      Objective Measures updated at progress report unless specified.     Treatment     Sal received the treatments listed below:      therapeutic exercises to develop strength, endurance, ROM, and flexibility for 25 minutes including:  -rockerboard 3 min ea DF/PF, Inv/Ev  -towel curls 3 min  -gastroc towel stretch 10 x 10 seconds  -ankle red theraband 4 way 2 x 10 ea way  -standing gastroc slantboard 2 x 1 min    manual therapy techniques:  were applied to the:  for  minutes, including:      neuromuscular re-education activities to improve:  for  minutes. The following activities were included:  -recumbent bike 5 min level 4  -heel raises 2 x 10  -2 x 30 seconds SLS green lazarus pad      hot pack for  minutes to .    cold pack for  minutes to .    Patient Education and Home Exercises       Education provided:   - Cont HEP    Written Home Exercises Provided: Patient instructed to cont prior HEP. Exercises were reviewed and Sal was able to demonstrate them prior to the end of the  session.  Sal demonstrated good  understanding of the education provided. See Electronic Medical Record under Patient Instructions for exercises provided during therapy sessions    Assessment     Patient tolerated today's treatment well with no increased reports of pain or discomfort.    Sal Is progressing well towards his goals.   Patient prognosis is Good.     Patient will continue to benefit from skilled outpatient physical therapy to address the deficits listed in the problem list box on initial evaluation, provide pt/family education and to maximize pt's level of independence in the home and community environment.     Patient's spiritual, cultural and educational needs considered and pt agreeable to plan of care and goals.     Anticipated barriers to physical therapy:     Goals: Short Term Goals: 2 weeks   1. Patient demonstrates independence with HEP.   2. Patient with min to nil point tenderness in plantar fascia.      Long Term Goals: 4 weeks   1. Patient demonstrates increased flexibility in right gastroc to improve tolerance to functional activities.   2. Patient demonstrates increased strength BLE's to 4/5 or greater to improve tolerance to functional activities.   3. Patient demonstrates improved overall function per LEFS to 40% or more.      PLAN   Plan of care Certification: 10/21/2024 to 12/2/2024.     Outpatient Physical Therapy 1 times weekly for 4 weeks to include the following interventions: Electrical Stimulation  , Manual Therapy, Moist Heat/ Ice, Neuromuscular Re-ed, Patient Education, Therapeutic Activities, and Therapeutic Exercise.      Rekha Carranza, PT      Rekha Carranza, PT          Yes

## 2024-11-18 ENCOUNTER — CLINICAL SUPPORT (OUTPATIENT)
Dept: REHABILITATION | Facility: HOSPITAL | Age: 69
End: 2024-11-18
Payer: COMMERCIAL

## 2024-11-18 DIAGNOSIS — M72.2 PLANTAR FASCIITIS: ICD-10-CM

## 2024-11-18 DIAGNOSIS — G89.29 CHRONIC PAIN OF RIGHT ANKLE: Primary | ICD-10-CM

## 2024-11-18 DIAGNOSIS — M25.571 CHRONIC PAIN OF RIGHT ANKLE: Primary | ICD-10-CM

## 2024-11-18 PROCEDURE — 97110 THERAPEUTIC EXERCISES: CPT | Mod: PN

## 2024-11-18 NOTE — PROGRESS NOTES
[facility]   Physical Therapy Treatment Note      Name: Sal Rowley  Clinic Number: [id number]    Therapy Diagnosis:   No diagnosis found.      Physician: Justice Mccarty MD    Visit Date: 11/18/2024        Diagnosis   M25.571,G89.29 (ICD-10-CM) - Chronic pain of right ankle      Evaluation Date: 10/21/2024  Authorization Period Expiration: 12/31/2024  Plan of Care Expiration: 12/2/2024  Progress Note Due: visit 4  Visit # / Visits authorized: 4/6   FOTO: 1/3     Precautions: Standard      Time In: 0845  Time Out: 0925  Total Appointment Time (timed & untimed codes): 40 minutes    PTA Visit #: -/5       Subjective     Patient reports: he is having more pain today.  He was compliant with home exercise program.  Response to previous treatment: positive  Functional change: minimal    Pain: 6/10  Location: right feet      Objective      Objective Measures updated at progress report unless specified.     Treatment     Sal received the treatments listed below:      therapeutic exercises to develop strength, endurance, ROM, and flexibility for 25 minutes including:  -rockerboard 3 min ea DF/PF, Inv/Ev  -towel curls 3 min  -gastroc towel stretch 10 x 10 seconds  -ankle red theraband 4 way 2 x 10 ea way  -standing gastroc slantboard 2 x 1 min    manual therapy techniques:  were applied to the:  for  minutes, including:      neuromuscular re-education activities to improve:  for  minutes. The following activities were included:  -recumbent bike 5 min level 4  -heel raises 2 x 10  -2 x 30 seconds SLS green lazarus pad      hot pack for  minutes to .    cold pack for  minutes to .    Patient Education and Home Exercises       Education provided:   - Cont HEP    Written Home Exercises Provided: Patient instructed to cont prior HEP. Exercises were reviewed and Sal was able to demonstrate them prior to the end of the session.  Sal demonstrated good  understanding of the education provided.  See Electronic Medical Record under Patient Instructions for exercises provided during therapy sessions    Assessment     Patient tolerated today's treatment well with no increased reports of pain or discomfort. Patient is discharged at this time secondary to meeting all goals.    Sal Is progressing well towards his goals.   Patient prognosis is Good.     Patient will continue to benefit from skilled outpatient physical therapy to address the deficits listed in the problem list box on initial evaluation, provide pt/family education and to maximize pt's level of independence in the home and community environment.     Patient's spiritual, cultural and educational needs considered and pt agreeable to plan of care and goals.     Anticipated barriers to physical therapy:     Goals: Short Term Goals: 2 weeks   1. Patient demonstrates independence with HEP. MET  2. Patient with min to nil point tenderness in plantar fascia. MET     Long Term Goals: 4 weeks   1. Patient demonstrates increased flexibility in right gastroc to improve tolerance to functional activities. MET  2. Patient demonstrates increased strength BLE's to 4/5 or greater to improve tolerance to functional activities. MET  3. Patient demonstrates improved overall function per LEFS to 40% or more. MET     PLAN   Plan of care Certification: 10/21/2024 to 12/2/2024.     Patient is discharged at this time. Patient has plateaued with therapy at this time.     Rekha Carranza, PT

## 2024-11-22 ENCOUNTER — CLINICAL SUPPORT (OUTPATIENT)
Dept: REHABILITATION | Facility: HOSPITAL | Age: 69
End: 2024-11-22
Payer: COMMERCIAL

## 2024-11-22 DIAGNOSIS — M25.519 SHOULDER PAIN, UNSPECIFIED CHRONICITY, UNSPECIFIED LATERALITY: Primary | ICD-10-CM

## 2024-11-22 DIAGNOSIS — M25.621 STIFFNESS OF JOINT, UPPER ARM, RIGHT: ICD-10-CM

## 2024-11-22 DIAGNOSIS — M89.9 SCAPULAR DYSFUNCTION: ICD-10-CM

## 2024-11-22 DIAGNOSIS — M62.81 MUSCLE WEAKNESS OF RIGHT UPPER EXTREMITY: ICD-10-CM

## 2024-11-22 PROCEDURE — 97035 APP MDLTY 1+ULTRASOUND EA 15: CPT | Mod: PN

## 2024-11-22 PROCEDURE — 97110 THERAPEUTIC EXERCISES: CPT | Mod: PN

## 2024-11-22 NOTE — PROGRESS NOTES
[facility]  Occupational Therapy Treatment Note    Date: 11/22/2024  Name: Sal Rowley  Clinic Number: [id number]    Therapy Diagnosis: No diagnosis found.  Physician: Justice Mccarty MD      Physician Orders: Eval and treat  Medical Diagnosis: (R) rotator cuff syndrome/dysfunction  Surgical Procedure and Date: n/a  Evaluation Date: 11/8/2024  Insurance Authorization Period Expiration: 9/30/2024 - 12/31/2024  Plan of Care Certification Period: 11/8/2024 - 1/8/2024  Progress Note Due: 12/8/2024   Date of Return to MD: n/a        Visit # / Visits authorized: 1 / 20      Precautions:  none    Time In: 11:00 am  Time Out: 11:45 am  Total Billable Time: 45 minutes    SUBJECTIVE     Pt reports: no new complaints  He was compliant with home exercise program given last session.     Response to previous treatment:none       Pain: 4/10 at rest    4 /10 with functional use  Location: right shoulder      OBJECTIVE    Measurements updated at progress report unless specified.    Treatment     Sal received the treatments listed below:     Supervised modalities after being cleared for contradictions for 8 minutes to include:    - MHP x 8 min to (R) shoulder increased soft tissue stretch and enhance joint mobility needed for session. Also to reduce pain and inflammation with session    Direct contact modalities after being cleared for contraindications for 8 minutes to include:    - Ultrasound 3.3m %  1.3cm to (R) humeral mid shaft and bicep region with anterior capsule to reduce pain and inflammation as well as adhesions associated  with decreased shoulder mobility      Therapeutic exercises to develop strength, endurance, ROM, and posture for 29 minutes, including:   - scapular mobility 2#cuffs all planes   - scapular retraction red theraband 2x10   - bicep/tricep curls red theraband 2x10   - isometric with HEP given (R) RTC   - UBE x 4 min no resistance tolerated with out wrist  break   - wall climbs with ladder for over head reaching   - ER yellow theraband 2x10      Patient Education and Home Exercises      Education provided:   - HEP of isometrics  - Progress towards goals     Written Home Exercises Provided: yes.  Exercises were reviewed and Sal was able to demonstrate them prior to the end of the session.  Sal demonstrated good  understanding of the HEP provided. See EMR under Patient Instructions for exercises provided during therapy sessions.       Assessment     Sal is progressing well towards his goals and there are no updates to goals at this time. Pt prognosis is Good. Pt will continue to benefit from skilled outpatient occupational therapy to address the deficits listed in the problem list on initial evaluation provide pt/family education and to maximize pt's level of independence in the home and community environment. Pt's spiritual, cultural and educational needs considered and pt agreeable to plan of care and goals.    Anticipated barriers to occupational therapy: none        Goals:   The following goals were discussed with the patient and patient is in agreement with them as to be addressed in the treatment plan.   Long Term Goals (LTGs); to be met by discharge.  LTG #1: Pt will report a pain level of 2 out of 10 with functional use of (R)UE           LTG #2: Pt will demo improved FOTO score by 20 points.   LTG #3: Pt will return to prior level of function for work related activities with use of (R)UE.   LTG #4: Pt will demonstrate increased ROM WFL in order increase functional use of UE  LTG #5: Pt will increase shoulder MMT within WFL in order to improve functional use of UE.  LTG #6: Pt will demonstrate increased  strength of 15#s in order to increase functional use of UE.  LTG #7: Pt will demonstrate independence with issued HEP.   LTG #8: Pt will display improved scapulothoracic gliding to improve overhead functional use of (R)UE        PLAN   Plan of Care  Certification: 11/8/2024 to 1/8/2025.      Outpatient Occupational Therapy 2 times weekly for 8 weeks to include the following interventions: Manual therapy/joint mobilizations, Modalities for pain management, US 3 mhz, Therapeutic exercises/activities., Strengthening, Joint Protection, and scapular stability.         Becca Membreno, OT

## 2024-11-25 PROBLEM — M25.621: Status: ACTIVE | Noted: 2024-11-25

## 2024-11-25 PROBLEM — M25.519 SHOULDER PAIN: Status: ACTIVE | Noted: 2024-11-25

## 2024-11-25 PROBLEM — M89.9 SCAPULAR DYSFUNCTION: Status: ACTIVE | Noted: 2024-11-25

## 2024-11-25 PROBLEM — M62.81 MUSCLE WEAKNESS OF RIGHT UPPER EXTREMITY: Status: ACTIVE | Noted: 2024-11-25

## 2024-12-02 ENCOUNTER — CLINICAL SUPPORT (OUTPATIENT)
Dept: REHABILITATION | Facility: HOSPITAL | Age: 69
End: 2024-12-02
Payer: COMMERCIAL

## 2024-12-02 DIAGNOSIS — M89.9 SCAPULAR DYSFUNCTION: ICD-10-CM

## 2024-12-02 DIAGNOSIS — M25.621 STIFFNESS OF JOINT, UPPER ARM, RIGHT: ICD-10-CM

## 2024-12-02 DIAGNOSIS — M62.81 MUSCLE WEAKNESS OF RIGHT UPPER EXTREMITY: ICD-10-CM

## 2024-12-02 DIAGNOSIS — M25.519 SHOULDER PAIN, UNSPECIFIED CHRONICITY, UNSPECIFIED LATERALITY: Primary | ICD-10-CM

## 2024-12-02 PROCEDURE — 97022 WHIRLPOOL THERAPY: CPT | Mod: PN

## 2024-12-02 PROCEDURE — 97110 THERAPEUTIC EXERCISES: CPT | Mod: PN

## 2024-12-02 PROCEDURE — 97035 APP MDLTY 1+ULTRASOUND EA 15: CPT | Mod: PN

## 2024-12-02 NOTE — PROGRESS NOTES
[facility]  Occupational Therapy Treatment Note    Date: 12/2/2024  Name: Sal Rowley  [facility] Number: [id number]    Therapy Diagnosis:   Encounter Diagnoses   Name Primary?    Shoulder pain, unspecified chronicity, unspecified laterality Yes    Scapular dysfunction     Stiffness of joint, upper arm, right     Muscle weakness of right upper extremity      Physician: Justice Mccarty MD      Physician Orders: Eval and treat  Medical Diagnosis: (R) rotator cuff syndrome/dysfunction  Surgical Procedure and Date: n/a  Evaluation Date: 11/8/2024  Insurance Authorization Period Expiration: 9/30/2024 - 12/31/2024  Plan of Care Certification Period: 11/8/2024 - 1/8/2024  Progress Note Due: 12/8/2024   Date of Return to MD: n/a        Visit # / Visits authorized:   2 / 20      Precautions:  none    Time In: 8:45 am  Time Out: 9:30 am  Total Billable Time: 45 minutes    SUBJECTIVE     Pt reports:  per pt report: pain localized to mid deltoid region  He was compliant with home exercise program given last session.     Response to previous treatment:none       Pain: 4/10 at rest    4 /10 with functional use  Location: right shoulder      OBJECTIVE    Measurements updated at progress report unless specified.    Treatment     Sal received the treatments listed below:     Supervised modalities after being cleared for contradictions for 18 minutes to include:    - MHP x 8 min to (R) shoulder increased soft tissue stretch and enhance joint mobility needed for session. Also to reduce pain and inflammation with session   - Fluidotherapy x 10 min with therapeutic range to 115 degrees to (R)UE to reduce joint stiffness and pain and inflammation with increased mobility of joints    Direct contact modalities after being cleared for contraindications for 8 minutes to include:    - Ultrasound 3.3m %  1.3cm to (R) humeral mid shaft and bicep region with anterior capsule to reduce pain and  inflammation as well as adhesions associated  with decreased shoulder mobility      Therapeutic exercises to develop strength, endurance, ROM, and posture for 19 minutes, including:   - scapular mobility 3#cuffs all planes   - scapular retraction red theraband 2x10   - bicep/tricep curls red theraband 2x10 (deferred)    - Posterior capsule strength 2# dowel on supine   - UBE x 4 min level 1.4 resistance tolerated with out wrist break   - wall climbs with ladder for over head reaching (deferred)   - ER yellow theraband 2x10   - anterior capsule stretch in supine with abduction to 90 degrees      Patient Education and Home Exercises      Education provided:   - HEP of isometrics  - Progress towards goals     Written Home Exercises Provided: yes.  Exercises were reviewed and Sal was able to demonstrate them prior to the end of the session.  Sal demonstrated good  understanding of the HEP provided. See EMR under Patient Instructions for exercises provided during therapy sessions.       Assessment   Sal tolerated session fairly with continues pain of the mid deltoid region with clinical origin noted at the (R) supraspinatus insertion.  He continues with decreased joint mobility and instability, palpable arthritic changes of the (R) shoulder.  Improved but limited (B) scapular gliding noted as well with decreased strength.  Sal is progressing fairly towards his goals and there are no updates to goals at this time. Pt prognosis is Good. Pt will continue to benefit from skilled outpatient occupational therapy to address the deficits listed in the problem list on initial evaluation provide pt/family education and to maximize pt's level of independence in the home and community environment. Pt's spiritual, cultural and educational needs considered and pt agreeable to plan of care and goals.    Anticipated barriers to occupational therapy: none      Goals:   The following goals were discussed with the patient and patient  is in agreement with them as to be addressed in the treatment plan.   Long Term Goals (LTGs); to be met by discharge.  LTG #1: Pt will report a pain level of 2 out of 10 with functional use of (R)UE           LTG #2: Pt will demo improved FOTO score by 20 points.   LTG #3: Pt will return to prior level of function for work related activities with use of (R)UE.   LTG #4: Pt will demonstrate increased ROM WFL in order increase functional use of UE  LTG #5: Pt will increase shoulder MMT within WFL in order to improve functional use of UE.  LTG #6: Pt will demonstrate increased  strength of 15#s in order to increase functional use of UE.  LTG #7: Pt will demonstrate independence with issued HEP.   LTG #8: Pt will display improved scapulothoracic gliding to improve overhead functional use of (R)UE        PLAN   Plan of Care Certification: 11/8/2024 to 1/8/2025.      Outpatient Occupational Therapy 2 times weekly for 8 weeks to include the following interventions: Manual therapy/joint mobilizations, Modalities for pain management, US 3 mhz, Therapeutic exercises/activities., Strengthening, Joint Protection, and scapular stability.         Becca Membreno, OT

## 2024-12-03 ENCOUNTER — TELEPHONE (OUTPATIENT)
Dept: FAMILY MEDICINE | Facility: CLINIC | Age: 69
End: 2024-12-03
Payer: COMMERCIAL

## 2024-12-03 DIAGNOSIS — M67.911 ROTATOR CUFF DISORDER, RIGHT: Primary | ICD-10-CM

## 2024-12-04 NOTE — TELEPHONE ENCOUNTER
----- Message from YG Hudson sent at 12/2/2024  2:24 PM CST -----  Regarding: MRI request  Mr. Rowley has continued to show consistent symptomatic clinical presentation of possible (R) rotator cuff dysfunction vs. tear, arthritic changes of the (R) glenohumeral joint with mobilization, and rotator cuff weakness with scapular dysfunction. He continues with significant right upper extremity pain.  OT is recommending MRI diagnostics to further assist in diagnosis and proper treatment of the above stated.  Upon his session today he requested to me in reaching out to you.  We will continued to provide treatment but are requesting an MRI.  Thank you for your time and this referral.

## 2024-12-06 ENCOUNTER — CLINICAL SUPPORT (OUTPATIENT)
Dept: REHABILITATION | Facility: HOSPITAL | Age: 69
End: 2024-12-06
Payer: COMMERCIAL

## 2024-12-06 DIAGNOSIS — M89.9 SCAPULAR DYSFUNCTION: ICD-10-CM

## 2024-12-06 DIAGNOSIS — M25.621 STIFFNESS OF JOINT, UPPER ARM, RIGHT: ICD-10-CM

## 2024-12-06 DIAGNOSIS — M25.519 SHOULDER PAIN, UNSPECIFIED CHRONICITY, UNSPECIFIED LATERALITY: Primary | ICD-10-CM

## 2024-12-06 DIAGNOSIS — M62.81 MUSCLE WEAKNESS OF RIGHT UPPER EXTREMITY: ICD-10-CM

## 2024-12-06 PROCEDURE — 97035 APP MDLTY 1+ULTRASOUND EA 15: CPT | Mod: PN

## 2024-12-06 PROCEDURE — 97110 THERAPEUTIC EXERCISES: CPT | Mod: PN

## 2024-12-06 PROCEDURE — 97140 MANUAL THERAPY 1/> REGIONS: CPT | Mod: PN

## 2024-12-06 NOTE — PROGRESS NOTES
[facility]  Occupational Therapy Treatment Note    Date: 12/6/2024  Name: Sal Rowley  Clinic Number: [id number]    Therapy Diagnosis:   Encounter Diagnoses   Name Primary?    Shoulder pain, unspecified chronicity, unspecified laterality Yes    Scapular dysfunction     Stiffness of joint, upper arm, right     Muscle weakness of right upper extremity        Physician: Justice Mccarty MD      Physician Orders: Eval and treat  Medical Diagnosis: (R) rotator cuff syndrome/dysfunction  Surgical Procedure and Date: n/a  Evaluation Date: 11/8/2024  Insurance Authorization Period Expiration: 9/30/2024 - 12/31/2024  Plan of Care Certification Period: 11/8/2024 - 1/8/2024  Progress Note Due: 12/8/2024   Date of Return to MD: n/a        Visit # / Visits authorized:   4 / 20      Precautions:  none    Time In: 8:45 am  Time Out: 9:30 am  Total Billable Time: 36 minutes    SUBJECTIVE     Pt reports:  No new complaints   He was compliant with home exercise program given last session.     Response to previous treatment:none       Pain: 4/10 at rest    4 /10 with functional use  Location: right shoulder      OBJECTIVE    Measurements updated at progress report unless specified.    Treatment     Sal received the treatments listed below:     Supervised modalities after being cleared for contradictions for 0 minutes to include:    - MHP x 8 min to (R) shoulder increased soft tissue stretch and enhance joint mobility needed for session. Also to reduce pain and inflammation with session   - Fluidotherapy x 10 min with therapeutic range to 115 degrees to (R)UE to reduce joint stiffness and pain and inflammation with increased mobility of joints    Direct contact modalities after being cleared for contraindications for 10 minutes to include:    - Ultrasound 3.3m %  1.0cm to (R) humeral mid shaft and bicep region with anterior capsule to reduce pain and inflammation as well as  adhesions associated  with decreased shoulder mobility      Therapeutic exercises to develop strength, endurance, ROM, and posture for 18 minutes, including:  - Posterior capsule stretch: 1 x 10 with 5 second end range hold  - external rotation stretch: 1 x 10 with 5 second end range hold   - Wall climbs: 1 x 10 to rung 27 no pain  - Side lying external rotation: 2 x 10 to neutral  - Supine shoulder flexion with 1# dowel to 90 degrees: 2 x 10  - Side lying abduction: 2 x 10 to 90 degrees  - Supine passive range of motion external rotation with Pt maintaining end range position and completing eccentric portion of movement: 1 x 10 with 10 second end range hold   - bicep/tricep curls red theraband 2x10   - Shrug: 3 x 10 3#  - Row: 3 x 10 red band    Trigger point massage: Applied to right lateral deltoid due to tender trigger point in area to reduce pain, relieve tension, and increase blood flow to area for 8 minutes.     Cold Pack - Applied to right shoulder to reduce post session inflammation and pain following therapy for 5 minutes.      Patient Education and Home Exercises      Education provided:   - HEP of isometrics  - Progress towards goals     Written Home Exercises Provided: yes.  Exercises were reviewed and Sal was able to demonstrate them prior to the end of the session.  Sal demonstrated good  understanding of the HEP provided. See EMR under Patient Instructions for exercises provided during therapy sessions.       Assessment   Sal tolerated session fairly. Pt with consistent report of lateral deltoid pain today with pain primarily with shoulder flexion and abduction. Noticeable weakness to active external rotation noted also. Sal is progressing fairly towards his goals and there are no updates to goals at this time. Pt prognosis is Good. Pt will continue to benefit from skilled outpatient occupational therapy to address the deficits listed in the problem list on initial evaluation provide pt/family  education and to maximize pt's level of independence in the home and community environment. Pt's spiritual, cultural and educational needs considered and pt agreeable to plan of care and goals.    Anticipated barriers to occupational therapy: none      Goals:   The following goals were discussed with the patient and patient is in agreement with them as to be addressed in the treatment plan.   Long Term Goals (LTGs); to be met by discharge.  LTG #1: Pt will report a pain level of 2 out of 10 with functional use of (R)UE           LTG #2: Pt will demo improved FOTO score by 20 points.   LTG #3: Pt will return to prior level of function for work related activities with use of (R)UE.   LTG #4: Pt will demonstrate increased ROM WFL in order increase functional use of UE  LTG #5: Pt will increase shoulder MMT within WFL in order to improve functional use of UE.  LTG #6: Pt will demonstrate increased  strength of 15#s in order to increase functional use of UE.  LTG #7: Pt will demonstrate independence with issued HEP.   LTG #8: Pt will display improved scapulothoracic gliding to improve overhead functional use of (R)UE        PLAN   Plan of Care Certification: 11/8/2024 to 1/8/2025.      Outpatient Occupational Therapy 2 times weekly for 8 weeks to include the following interventions: Manual therapy/joint mobilizations, Modalities for pain management, US 3 mhz, Therapeutic exercises/activities., Strengthening, Joint Protection, and scapular stability.         Sixto Sanchez OT

## 2024-12-09 ENCOUNTER — CLINICAL SUPPORT (OUTPATIENT)
Dept: REHABILITATION | Facility: HOSPITAL | Age: 69
End: 2024-12-09
Payer: COMMERCIAL

## 2024-12-09 DIAGNOSIS — M89.9 SCAPULAR DYSFUNCTION: ICD-10-CM

## 2024-12-09 DIAGNOSIS — M25.519 SHOULDER PAIN, UNSPECIFIED CHRONICITY, UNSPECIFIED LATERALITY: Primary | ICD-10-CM

## 2024-12-09 DIAGNOSIS — M62.81 MUSCLE WEAKNESS OF RIGHT UPPER EXTREMITY: ICD-10-CM

## 2024-12-09 DIAGNOSIS — M25.621 STIFFNESS OF JOINT, UPPER ARM, RIGHT: ICD-10-CM

## 2024-12-09 PROCEDURE — 97530 THERAPEUTIC ACTIVITIES: CPT | Mod: PN,CO

## 2024-12-09 PROCEDURE — 97110 THERAPEUTIC EXERCISES: CPT | Mod: PN,CO

## 2024-12-09 PROCEDURE — 97140 MANUAL THERAPY 1/> REGIONS: CPT | Mod: PN,CO

## 2024-12-09 NOTE — PROGRESS NOTES
[facility]  Occupational Therapy Treatment Note    Date: 12/9/2024  Name: Sal Rowley  Clinic Number: [id number]    Therapy Diagnosis:   Encounter Diagnoses   Name Primary?    Shoulder pain, unspecified chronicity, unspecified laterality Yes    Scapular dysfunction     Stiffness of joint, upper arm, right     Muscle weakness of right upper extremity        Physician: Justice Mccarty MD      Physician Orders: Eval and treat  Medical Diagnosis: (R) rotator cuff syndrome/dysfunction  Surgical Procedure and Date: n/a  Evaluation Date: 11/8/2024  Insurance Authorization Period Expiration: 9/30/2024 - 12/31/2024  Plan of Care Certification Period: 11/8/2024 - 1/8/2024  Progress Note Due: 12/8/2024   Date of Return to MD: n/a        Visit # / Visits authorized:   4 / 20      Precautions:  none    Time In: 8:45 am  Time Out: 9:30 am  Total Billable Time: 45 minutes    SUBJECTIVE     Pt reports:  no new complaints  He was compliant with home exercise program given last session.     Response to previous treatment:none       Pain: 4/10 at rest    4 /10 with functional use  Location: right shoulder      OBJECTIVE    Measurements updated at progress report unless specified.    Treatment     Sal received the treatments listed below:     Supervised modalities after being cleared for contradictions for 5 minutes to include:    - MHP x 5 min to (R) shoulder increased soft tissue stretch and enhance joint mobility needed for session. Also to reduce pain and inflammation with session   - Fluidotherapy x 10 min with therapeutic range to 115 degrees to (R)UE to reduce joint stiffness and pain and inflammation with increased mobility of joints (deferred)  -Applied E Stim / IFC to (R) shoulder x 15 min in conjunction with shoulder therapeutic exercise to decrease inflammation and reduce pain following PROM and therapeutic interventions. Electrodes were places surrounding painful area.  Parameters were set to 20.6 initially and thought duration, patient able to increase to 27.2. Patient tolerated well. (Time was included in activity section)     Direct contact modalities after being cleared for contraindications for 0 minutes to include:    - Ultrasound 3.3m %  1.3cm to (R) humeral mid shaft and bicep region with anterior capsule to reduce pain and inflammation as well as adhesions associated  with decreased shoulder mobility (Deferred)    Manual therapy x 10 min  -IASTM applied to deltoids to improve mobility, increases collagen production, and restore proper functioning.   -Myofacial muscle trigger point message to reduce tightness, muscle knots, pain and inflammation post session with decreased muscle spasms and trigger points noted in deltoids region post manual therapy.     Therapeutic exercises, therapeutic activity, neuromuscular re-education to develop strength, endurance, ROM, and posture for 30 minutes, including:   - Wall climbs: 2 x 10 to rung 31 no pain   - Side lying external rotation: 2 x 10 to neutral   - Side lying abduction: 2 x 10 to 90 degrees   - scapular mobility 4lb DB all planes   - scapular retraction red theraband 2x10   - bicep/tricep curls red theraband 2x10    - Posterior capsule strength 2# dowel on supine (deferred)   - UBE x 4 min level 3.0 resistance tolerated with out rest break   - ER yellow theraband 2x10   - anterior capsule stretch in supine with abduction to 90 degrees      Patient Education and Home Exercises      Education provided:   - HEP of isometrics  - Progress towards goals     Written Home Exercises Provided: yes.  Exercises were reviewed and Sal was able to demonstrate them prior to the end of the session.  Sal demonstrated good  understanding of the HEP provided. See EMR under Patient Instructions for exercises provided during therapy sessions.       Assessment   Sal tolerated session fairly with continues pain of the mid deltoid region  with clinical origin noted at the (R) supraspinatus insertion.  He continues with decreased joint mobility and instability, palpable arthritic changes of the (R) shoulder.  Improved but limited (B) scapular gliding noted as well with decreased strength. Pt reported much relief form IFC today. Took patient a little while to find tender spot. Pt instructed on continuing to complete massages at home to further decrease muscle tension in deltoids. Sal is progressing fairly towards his goals and there are no updates to goals at this time. Pt prognosis is Good. Pt will continue to benefit from skilled outpatient occupational therapy to address the deficits listed in the problem list on initial evaluation provide pt/family education and to maximize pt's level of independence in the home and community environment. Pt's spiritual, cultural and educational needs considered and pt agreeable to plan of care and goals.    Anticipated barriers to occupational therapy: none      Goals:   The following goals were discussed with the patient and patient is in agreement with them as to be addressed in the treatment plan.   Long Term Goals (LTGs); to be met by discharge.  LTG #1: Pt will report a pain level of 2 out of 10 with functional use of (R)UE           LTG #2: Pt will demo improved FOTO score by 20 points.   LTG #3: Pt will return to prior level of function for work related activities with use of (R)UE.   LTG #4: Pt will demonstrate increased ROM WFL in order increase functional use of UE  LTG #5: Pt will increase shoulder MMT within WFL in order to improve functional use of UE.  LTG #6: Pt will demonstrate increased  strength of 15#s in order to increase functional use of UE.  LTG #7: Pt will demonstrate independence with issued HEP.   LTG #8: Pt will display improved scapulothoracic gliding to improve overhead functional use of (R)UE        PLAN   Plan of Care Certification: 11/8/2024 to 1/8/2025.      Outpatient  Occupational Therapy 2 times weekly for 8 weeks to include the following interventions: Manual therapy/joint mobilizations, Modalities for pain management, US 3 mhz, Therapeutic exercises/activities., Strengthening, Joint Protection, and scapular stability.         ADELITA Thompson/JULIOCESAR

## 2024-12-10 NOTE — TELEPHONE ENCOUNTER
Contacted/spoke to pt and notified of providers recommendations. MRI appt scheduled for 12/20 at 1345. Pt gave understanding.

## 2024-12-13 ENCOUNTER — CLINICAL SUPPORT (OUTPATIENT)
Dept: REHABILITATION | Facility: HOSPITAL | Age: 69
End: 2024-12-13
Payer: COMMERCIAL

## 2024-12-13 DIAGNOSIS — M89.9 SCAPULAR DYSFUNCTION: ICD-10-CM

## 2024-12-13 DIAGNOSIS — M62.81 MUSCLE WEAKNESS OF RIGHT UPPER EXTREMITY: ICD-10-CM

## 2024-12-13 DIAGNOSIS — M25.621 STIFFNESS OF JOINT, UPPER ARM, RIGHT: ICD-10-CM

## 2024-12-13 DIAGNOSIS — M25.519 SHOULDER PAIN, UNSPECIFIED CHRONICITY, UNSPECIFIED LATERALITY: Primary | ICD-10-CM

## 2024-12-13 PROCEDURE — 97110 THERAPEUTIC EXERCISES: CPT | Mod: PN

## 2024-12-13 PROCEDURE — 97035 APP MDLTY 1+ULTRASOUND EA 15: CPT | Mod: PN

## 2024-12-20 ENCOUNTER — HOSPITAL ENCOUNTER (OUTPATIENT)
Dept: RADIOLOGY | Facility: HOSPITAL | Age: 69
Discharge: HOME | End: 2024-12-20
Attending: FAMILY MEDICINE
Payer: COMMERCIAL

## 2024-12-20 DIAGNOSIS — M67.911 ROTATOR CUFF DISORDER, RIGHT: ICD-10-CM

## 2024-12-20 PROCEDURE — 73221 MRI JOINT UPR EXTREM W/O DYE: CPT | Mod: TC,RT

## 2024-12-20 PROCEDURE — 73221 MRI JOINT UPR EXTREM W/O DYE: CPT | Mod: 26,RT,, | Performed by: RADIOLOGY

## 2024-12-20 NOTE — PROGRESS NOTES
[facility]  Occupational Therapy Treatment Note    Date: 12/13/2024  Name: Sal Rowley  Clinic Number: [id number]    Therapy Diagnosis:   Encounter Diagnoses   Name Primary?    Shoulder pain, unspecified chronicity, unspecified laterality Yes    Scapular dysfunction     Stiffness of joint, upper arm, right     Muscle weakness of right upper extremity        Physician: Justice Mccarty MD      Physician Orders: Eval and treat  Medical Diagnosis: (R) rotator cuff syndrome/dysfunction  Surgical Procedure and Date: n/a  Evaluation Date: 11/8/2024  Insurance Authorization Period Expiration: 9/30/2024 - 12/31/2024  Plan of Care Certification Period: 11/8/2024 - 1/8/2024  Progress Note Due: 12/8/2024   Date of Return to MD: n/a        Visit # / Visits authorized:   5 / 20      Precautions:  none    Time In: 11:00 am  Time Out: 11:45 am  Total Billable Time: 45 minutes    SUBJECTIVE     Pt reports:  no new complaints  He was compliant with home exercise program given last session.     Response to previous treatment:none       Pain: 4/10 at rest    4 /10 with functional use  Location: right shoulder      OBJECTIVE    Measurements updated at progress report unless specified.    Treatment     Sal received the treatments listed below:     Supervised modalities after being cleared for contradictions for 8 minutes to include:    - MHP x 8 min to (R) shoulder increased soft tissue stretch and enhance joint mobility needed for session. Also to reduce pain and inflammation with session   - Fluidotherapy x min with therapeutic range to 115 degrees to (R)UE to reduce joint stiffness and pain and inflammation with increased mobility of joints (deferred)  -Applied E Stim / IFC to (R) shoulder x 15 min in conjunction with shoulder therapeutic exercise to decrease inflammation and reduce pain following PROM and therapeutic interventions. Electrodes were places surrounding painful area.  Parameters were set to 20.6 initially and thought duration, patient able to increase to 27.2. Patient tolerated well. (Time was included in activity section)     Direct contact modalities after being cleared for contraindications for 8 minutes to include:    - Ultrasound 3.3m %  1.3cm to (R) humeral mid shaft and bicep region with anterior capsule to reduce pain and inflammation as well as adhesions associated  with decreased shoulder mobility (Deferred)    Manual therapy x 0 min  -IASTM applied to deltoids to improve mobility, increases collagen production, and restore proper functioning.   -Myofacial muscle trigger point message to reduce tightness, muscle knots, pain and inflammation post session with decreased muscle spasms and trigger points noted in deltoids region post manual therapy.     Therapeutic exercises, therapeutic activity, neuromuscular re-education to develop strength, endurance, ROM, and posture for 29 minutes, including:   - Wall climbs:    - Supine abduction: 2 x 10 to 90 degrees   - scapular mobility 4lb DB all planes   - scapular retraction red theraband 2x10   - bicep/tricep curls red theraband 2x10    - Posterior capsule strength 2# dowel on supine (deferred)   - UBE x 4 min level 3.0 resistance tolerated with out rest break   - ER yellow theraband 2x10   - anterior capsule stretch in supine with abduction to 90 degrees      Patient Education and Home Exercises      Education provided:   - HEP of isometrics  - Progress towards goals     Written Home Exercises Provided: yes.  Exercises were reviewed and Sal was able to demonstrate them prior to the end of the session.  Sal demonstrated good  understanding of the HEP provided. See EMR under Patient Instructions for exercises provided during therapy sessions.       Assessment   Sal displayed good tolerated to his session today with continues pain noted at the (R) supraspinatus insertion with radiating to  mid- deltiod as well as  instability, scapular limitation , and arthritic changes of the (R) shoulder.   Sal is progressing fairly towards his goals and there are no updates to goals at this time. Pt prognosis is Good. Pt will continue to benefit from skilled outpatient occupational therapy to address the deficits listed in the problem list on initial evaluation provide pt/family education and to maximize pt's level of independence in the home and community environment. Pt's spiritual, cultural and educational needs considered and pt agreeable to plan of care and goals.    Anticipated barriers to occupational therapy: none      Goals:   The following goals were discussed with the patient and patient is in agreement with them as to be addressed in the treatment plan.   Long Term Goals (LTGs); to be met by discharge.  LTG #1: Pt will report a pain level of 2 out of 10 with functional use of (R)UE           LTG #2: Pt will demo improved FOTO score by 20 points.   LTG #3: Pt will return to prior level of function for work related activities with use of (R)UE.   LTG #4: Pt will demonstrate increased ROM WFL in order increase functional use of UE  LTG #5: Pt will increase shoulder MMT within WFL in order to improve functional use of UE.  LTG #6: Pt will demonstrate increased  strength of 15#s in order to increase functional use of UE.  LTG #7: Pt will demonstrate independence with issued HEP.   LTG #8: Pt will display improved scapulothoracic gliding to improve overhead functional use of (R)UE        PLAN   Plan of Care Certification: 11/8/2024 to 1/8/2025.      Outpatient Occupational Therapy 2 times weekly for 8 weeks to include the following interventions: Manual therapy/joint mobilizations, Modalities for pain management, US 3 mhz, Therapeutic exercises/activities., Strengthening, Joint Protection, and scapular stability.         Becca Membreno, OT

## 2024-12-23 DIAGNOSIS — M67.911 DISORDER OF ROTATOR CUFF OF BOTH SHOULDERS: Primary | ICD-10-CM

## 2024-12-23 DIAGNOSIS — M67.912 DISORDER OF ROTATOR CUFF OF BOTH SHOULDERS: Primary | ICD-10-CM

## 2024-12-23 NOTE — PROGRESS NOTES
2 of his for rotator cuff tendons are torn, labrum is torn, and severe arthritis in his shoulder. He needs to see a specialist. I am sending him to Dr. Ponce who only does shoulders. He is the best in the area. Thanks

## 2024-12-24 ENCOUNTER — TELEPHONE (OUTPATIENT)
Dept: FAMILY MEDICINE | Facility: CLINIC | Age: 69
End: 2024-12-24
Payer: COMMERCIAL

## 2024-12-24 NOTE — TELEPHONE ENCOUNTER
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 He needs to see a specialist. I am sending him to Dr. Ponce who only does shoulders. He is the best in the area. Thanks

## 2024-12-26 ENCOUNTER — OFFICE VISIT (OUTPATIENT)
Dept: URGENT CARE | Facility: CLINIC | Age: 69
End: 2024-12-26
Payer: COMMERCIAL

## 2024-12-26 VITALS
HEART RATE: 69 BPM | TEMPERATURE: 99 F | OXYGEN SATURATION: 97 % | BODY MASS INDEX: 36.03 KG/M2 | SYSTOLIC BLOOD PRESSURE: 183 MMHG | DIASTOLIC BLOOD PRESSURE: 108 MMHG | RESPIRATION RATE: 19 BRPM | HEIGHT: 65 IN | WEIGHT: 216.25 LBS

## 2024-12-26 DIAGNOSIS — J01.40 ACUTE NON-RECURRENT PANSINUSITIS: ICD-10-CM

## 2024-12-26 DIAGNOSIS — J02.9 ACUTE PHARYNGITIS, UNSPECIFIED ETIOLOGY: Primary | ICD-10-CM

## 2024-12-26 RX ORDER — MELOXICAM 7.5 MG/1
7.5 TABLET ORAL DAILY
Qty: 20 TABLET | Refills: 0 | Status: SHIPPED | OUTPATIENT
Start: 2024-12-26 | End: 2025-01-15

## 2024-12-26 RX ORDER — BENZONATATE 100 MG/1
100 CAPSULE ORAL 3 TIMES DAILY
COMMUNITY
Start: 2024-12-17

## 2024-12-26 RX ORDER — DEXTROMETHORPHAN HBR, GUAIFENESIN AND PSEUDOEPHEDRINE HCL 60; 380; 20 MG/1; MG/1; MG/1
1 TABLET ORAL EVERY 6 HOURS PRN
Qty: 20 TABLET | Refills: 0 | Status: SHIPPED | OUTPATIENT
Start: 2024-12-26

## 2024-12-26 RX ORDER — CEFDINIR 300 MG/1
300 CAPSULE ORAL 2 TIMES DAILY
Qty: 20 CAPSULE | Refills: 0 | Status: SHIPPED | OUTPATIENT
Start: 2024-12-26 | End: 2025-01-05

## 2024-12-26 NOTE — PROGRESS NOTES
"Subjective:      Patient ID: Sal Rowley is a 69 y.o. male.    Vitals:  height is 5' 4.96" (1.65 m) and weight is 98.1 kg (216 lb 4.3 oz). His oral temperature is 98.6 °F (37 °C). His blood pressure is 183/108 (abnormal) and his pulse is 69. His respiration is 19 and oxygen saturation is 97%.     Chief Complaint: Cough    Pt states he's been coughing ,has a positive flu A on 12/17/24. Still having the cough.    Cough  This is a new problem. The current episode started 1 to 4 weeks ago. The problem has been gradually worsening. The problem occurs constantly. Associated symptoms include headaches, postnasal drip and a sore throat. Pertinent negatives include no chest pain, chills, ear pain or fever. Nothing aggravates the symptoms. He has tried prescription cough suppressant for the symptoms. The treatment provided mild relief.       Constitution: Negative. Negative for chills and fever.   HENT:  Positive for postnasal drip and sore throat. Negative for ear pain.    Cardiovascular: Negative.  Negative for chest pain.   Eyes: Negative.    Respiratory:  Positive for cough.    Gastrointestinal: Negative.    Endocrine: negative.   Genitourinary: Negative.    Musculoskeletal: Negative.    Skin: Negative.    Allergic/Immunologic: Negative.    Neurological:  Positive for headaches.   Hematologic/Lymphatic: Negative.    Psychiatric/Behavioral: Negative.        Objective:     Physical Exam   Constitutional: He is oriented to person, place, and time. He appears well-developed. He is cooperative.  Non-toxic appearance. He does not appear ill. No distress.   HENT:   Head: Normocephalic and atraumatic.   Ears:   Right Ear: Hearing, tympanic membrane, external ear and ear canal normal.   Left Ear: Hearing, tympanic membrane, external ear and ear canal normal.   Nose: No mucosal edema, rhinorrhea or nasal deformity. No epistaxis. Right sinus exhibits maxillary sinus tenderness and frontal sinus tenderness. Left sinus " exhibits maxillary sinus tenderness and frontal sinus tenderness.   Mouth/Throat: Uvula is midline and mucous membranes are normal. No trismus in the jaw. Normal dentition. No uvula swelling. Posterior oropharyngeal edema and posterior oropharyngeal erythema present.   Eyes: Conjunctivae and lids are normal. No scleral icterus.   Neck: Trachea normal and phonation normal. Neck supple. No edema present. No erythema present. No neck rigidity present.   Cardiovascular: Normal rate, regular rhythm, normal heart sounds and normal pulses.   Pulmonary/Chest: Effort normal and breath sounds normal. No respiratory distress. He has no decreased breath sounds. He has no rhonchi.   Abdominal: Normal appearance.   Musculoskeletal: Normal range of motion.         General: No deformity or edema. Normal range of motion.   Neurological: He is alert and oriented to person, place, and time. He exhibits normal muscle tone. Coordination normal.   Skin: Skin is warm, dry, intact, not diaphoretic and not pale.   Psychiatric: His speech is normal and behavior is normal. Judgment and thought content normal.   Nursing note and vitals reviewed.      Assessment:     1. Acute pharyngitis, unspecified etiology    2. Acute non-recurrent pansinusitis        Plan:       Acute pharyngitis, unspecified etiology    Acute non-recurrent pansinusitis  -     cefdinir (OMNICEF) 300 MG capsule; Take 1 capsule (300 mg total) by mouth 2 (two) times daily. for 10 days  Dispense: 20 capsule; Refill: 0  -     pseudoephedrine-DM-guaiFENesin (POLY-VENT DM) 60- mg Tab; Take 1 tablet by mouth every 6 (six) hours as needed.  Dispense: 20 tablet; Refill: 0  -     meloxicam (MOBIC) 7.5 MG tablet; Take 1 tablet (7.5 mg total) by mouth once daily. for 20 days  Dispense: 20 tablet; Refill: 0    Please drink plenty of fluids.  Please get plenty of rest.  Please return here or go to the Emergency Department for any concerns or worsening of condition.  If you were  given wait & see antibiotics, please wait 3-5 days before taking them, and only take them if your symptoms have worsened or not improved.  If you do begin taking the antibiotics, please take them to completion.  If you were prescribed antibiotics, please take them to completion.  If you were prescribed a narcotic medication, do not drive or operate heavy equipment or machinery while taking these medications.    You were given a decongestant (RESCON or POLY VENT Dm).  If your insurance does not cover it or you cannot afford it, it is ok to use the over the counter products listed below.  If you do not have Hypertension or any history of palpitations, it is ok to take over the counter Sudafed or Mucinex D or Allegra-D or Claritin-D or Zyrtec-D.  If you do take one of the above, it is ok to combine that with plain over the counter Mucinex or Allegra or Claritin or Zyrtec.  If for example you are taking Zyrtec -D, you can combine that with Mucinex, but not Mucinex-D.  If you are taking Mucinex-D, you can combine that with plain Allegra or Claritin or Zyrtec.   If you do have Hypertension or palpitations, it is safe to take Coricidin HBP for relief of sinus symptoms.    We recommend you take over the counter Flonase (Fluticasone) or another nasally inhaled steroid unless you are already taking one.  Nasal irrigation with a saline spray or Netti Pot like device per their directions is also recommended.  If not allergic, please take over the counter Tylenol (Acetaminophen) and/or Motrin (Ibuprofen) as directed for control of pain and/or fever.    Robitussin DM 2 teas every 4 hours as needed for cough.  If you  smoke, please stop smoking.    Please follow up with your primary care doctor or specialist as needed.  Justice Mccarty MD  [phone]    You must understand that you have received treatment at an Urgent Care facility only, and that you may be  released before all of your medical problems are known or treated.  Urgent Care facilities are not equipped to  handle life threatening emergencies. It is recommended that you seek care at an Emergency Department for  further evaluation of worsening or concerning symptoms, or possibly life threatening conditions as  discussed.

## 2024-12-26 NOTE — PATIENT INSTRUCTIONS
Please drink plenty of fluids.  Please get plenty of rest.  Please return here or go to the Emergency Department for any concerns or worsening of condition.  If you were given wait & see antibiotics, please wait 3-5 days before taking them, and only take them if your symptoms have worsened or not improved.  If you do begin taking the antibiotics, please take them to completion.  If you were prescribed antibiotics, please take them to completion.  If you were prescribed a narcotic medication, do not drive or operate heavy equipment or machinery while taking these medications.    You were given a decongestant (RESCON or POLY VENT Dm).  If your insurance does not cover it or you cannot afford it, it is ok to use the over the counter products listed below.  If you do not have Hypertension or any history of palpitations, it is ok to take over the counter Sudafed or Mucinex D or Allegra-D or Claritin-D or Zyrtec-D.  If you do take one of the above, it is ok to combine that with plain over the counter Mucinex or Allegra or Claritin or Zyrtec.  If for example you are taking Zyrtec -D, you can combine that with Mucinex, but not Mucinex-D.  If you are taking Mucinex-D, you can combine that with plain Allegra or Claritin or Zyrtec.   If you do have Hypertension or palpitations, it is safe to take Coricidin HBP for relief of sinus symptoms.    We recommend you take over the counter Flonase (Fluticasone) or another nasally inhaled steroid unless you are already taking one.  Nasal irrigation with a saline spray or Netti Pot like device per their directions is also recommended.  If not allergic, please take over the counter Tylenol (Acetaminophen) and/or Motrin (Ibuprofen) as directed for control of pain and/or fever.    Robitussin DM 2 teas every 4 hours as needed for cough.  If you  smoke, please stop smoking.    Please follow up with your primary care doctor or specialist as needed.  Justice Mccarty MD  [phone]    You  must understand that you have received treatment at an Urgent Care facility only, and that you may be  released before all of your medical problems are known or treated. Urgent Care facilities are not equipped to  handle life threatening emergencies. It is recommended that you seek care at an Emergency Department for  further evaluation of worsening or concerning symptoms, or possibly life threatening conditions as  discussed.    Pharyngitis: Strep (Presumed)    You have pharyngitis (sore throat). The cause is thought to be the streptococcus, or strep, bacterium. Strep throat infection can cause throat pain that is worse when swallowing, aching all over, headache, and fever. The infection may be spread by coughing, kissing, or touching others after touching your mouth or nose. Antibiotic medications are given to treat the infection.  Home care  Rest at home. Drink plenty of fluids to avoid dehydration.  No work or school for the first 2 days of taking the antibiotics. After this time, you will not be contagious. You can then return to work or school if you are feeling better.   The antibiotic medication must be taken for the full 10 days, even if you feel better. This is very important to ensure the infection is treated. It is also important to prevent drug-resistant organisms from developing. If you were given an antibiotic shot, no more antibiotics are needed.  You may use acetaminophen or ibuprofen to control pain or fever, unless another medicine was prescribed for this. If you have chronic liver or kidney disease or ever had a stomach ulcer or GI bleeding, talk with your doctor before using these medicines.  Throat lozenges or a throat-numbing sprays can help reduce throat pain. Gargling with warm salt water can also help. Dissolve 1/2 teaspoon of salt in 1 8 ounce glass of warm water.   Avoid salty or spicy foods, which can irritate the throat.  Follow-up care  Follow up with your healthcare provider or our  staff if you are not improving over the next week.  When to seek medical advice  Call your healthcare provider right away if any of these occur:  Fever as directed by your doctor.   New or worsening ear pain, sinus pain, or headache  Painful lumps in the back of neck  Stiff neck  Lymph nodes are getting larger  Inability to swallow liquids, excessive drooling, or inability to open mouth wide due to throat pain  Signs of dehydration (very dark urine or no urine, sunken eyes, dizziness)  Trouble breathing or noisy breathing  Muffled voice  New rash  Date Last Reviewed: 4/13/2015 © 2000-2016 [employer]. [address] [place] [id number]. All rights reserved. This information is not intended as a substitute for professional medical care. Always follow your healthcare professional's instructions.      Acute Bacterial Rhinosinusitis (ABRS)  Acute bacterial rhinosinusitis (ABRS) is an infection of your nasal cavity and sinuses. Its caused by bacteria. Acute means that youve had symptoms for less than 12 weeks.  Understanding your sinuses  The nasal cavity is the large air-filled space behind your nose. The sinuses are a group of spaces formed by the bones of your face. They connect with your nasal cavity. ABRS causes the tissue lining these spaces to become inflamed. Mucus may not drain normally. This leads to facial pain and other symptoms.  What causes ABRS?  ABRS most often follows an upper respiratory infection caused by a virus. Bacteria then infect the lining of your nasal cavity and sinuses. But you can also get ABRS if you have:  Nasal allergies  Long-term nasal swelling and congestion not caused by allergies  Blockage in the nose  Symptoms of ABRS  The symptoms of ABRS may be different for each person, and can include:  Nasal congestion  Runny nose  Fluid draining from the nose down the throat (postnasal drip)  Headache  Cough  Pain in the sinuses  Thick, colored fluid from the nose  (mucus)  Fever  Diagnosing ABRS  ABRS may be diagnosed if youve had an upper respiratory infection like a cold and cough for longer than 10 to 14 days. Your health care provider will ask about your symptoms and your medical history. The provider will check your vital signs, including your temperature. Youll have a physical exam. The health care provider will check your ears, nose, and throat. You likely wont need any tests. If ABRS comes back, you may have a culture or other tests.  Treatment for ABRS  Treatment may include:  Antibiotic medicine. This is for symptoms that last for at least 10 to 14 days.  Nasal corticosteroid medicine. Drops or spray used in the nose can lessen swelling and congestion.  Over-the-counter pain medicine. This is to lessen sinus pain and pressure.  Nasal decongestant medicine. Spray or drops may help to lessen congestion. Do not use them for more than a few days.  Salt wash (saline irrigation). This can help to loosen mucus.  Possible complications of ABRS  ABRS may come back or become long-term (chronic).  In rare cases, ABRS may cause complications such as:   Inflamed tissue around the brain and spinal cord (meningitis)  Inflamed tissue around the eyes (orbital cellulitis)  Inflamed bones around the sinuses (osteitis)  These problems may need to be treated in a hospital with intravenous (IV) antibiotic medicine or surgery.  When to call the health care provider  Call your health care provider if you have any of the following:  Symptoms that dont get better, or get worse  Symptoms that dont get better after 3 to 5 days on antibiotics  Trouble seeing  Swelling around your eyes  Confusion or trouble staying awake   Date Last Reviewed: 3/3/2015  © 1411-5308 [employer]. [address] [place] [address], [place] [address]. All rights reserved. This information is not intended as a substitute for professional medical care. Always follow your healthcare professional's  instructions.

## 2024-12-26 NOTE — LETTER
December 26, 2024  Sal Rowley  [address]  [place] [address]                [facility] [place]  [address] [place] [address], [address]  [place] [address]  Phone: [phone]  Fax: [phone] Sal Rowley was seen and treated in our Urgent Care department on 12/26/2024. He may return to work in 2 - 3 days.      If you have any questions or concerns, please don't hesitate to call.        Sincerely,        Israel Sanchez MD

## 2025-01-29 ENCOUNTER — TELEPHONE (OUTPATIENT)
Dept: FAMILY MEDICINE | Facility: CLINIC | Age: 70
End: 2025-01-29
Payer: COMMERCIAL

## 2025-01-29 NOTE — TELEPHONE ENCOUNTER
Contacted/spoke to pt, states no call has been received in regards to Orthopedic referral faxed on 12/27/24. Advised pt referral has been re faxed for appt request/status. Pt gave understanding.

## 2025-01-29 NOTE — TELEPHONE ENCOUNTER
----- Message from Brian sent at 2025 10:42 AM CST -----  Contact: self  Sal Rowley  MRN: [id number]  : 1955  PCP: Justice Mccarty  Home Phone      [phone]  Work Phone      Not on file.  Mobile          [phone]  Home Phone      Not on file.      MESSAGE:   Pt was referred to a Ortho in December and he never received a phone call    Please advice  [phone]

## 2025-02-03 ENCOUNTER — TELEPHONE (OUTPATIENT)
Dept: PAIN MEDICINE | Facility: CLINIC | Age: 70
End: 2025-02-03
Payer: COMMERCIAL

## 2025-02-03 ENCOUNTER — OFFICE VISIT (OUTPATIENT)
Dept: NEUROLOGY | Facility: CLINIC | Age: 70
End: 2025-02-03
Payer: COMMERCIAL

## 2025-02-03 VITALS
HEART RATE: 76 BPM | RESPIRATION RATE: 14 BRPM | SYSTOLIC BLOOD PRESSURE: 138 MMHG | WEIGHT: 215.63 LBS | DIASTOLIC BLOOD PRESSURE: 88 MMHG | HEIGHT: 65 IN | BODY MASS INDEX: 35.93 KG/M2

## 2025-02-03 DIAGNOSIS — M54.17 LUMBOSACRAL RADICULOPATHY: Primary | ICD-10-CM

## 2025-02-03 DIAGNOSIS — R20.0 RIGHT LEG NUMBNESS: ICD-10-CM

## 2025-02-03 DIAGNOSIS — E66.01 SEVERE OBESITY (BMI 35.0-39.9) WITH COMORBIDITY: ICD-10-CM

## 2025-02-03 PROCEDURE — 4010F ACE/ARB THERAPY RXD/TAKEN: CPT | Mod: CPTII,S$GLB,, | Performed by: PSYCHIATRY & NEUROLOGY

## 2025-02-03 PROCEDURE — 3079F DIAST BP 80-89 MM HG: CPT | Mod: CPTII,S$GLB,, | Performed by: PSYCHIATRY & NEUROLOGY

## 2025-02-03 PROCEDURE — 99999 PR PBB SHADOW E&M-EST. PATIENT-LVL III: CPT | Mod: PBBFAC,,, | Performed by: PSYCHIATRY & NEUROLOGY

## 2025-02-03 PROCEDURE — 3008F BODY MASS INDEX DOCD: CPT | Mod: CPTII,S$GLB,, | Performed by: PSYCHIATRY & NEUROLOGY

## 2025-02-03 PROCEDURE — 1125F AMNT PAIN NOTED PAIN PRSNT: CPT | Mod: CPTII,S$GLB,, | Performed by: PSYCHIATRY & NEUROLOGY

## 2025-02-03 PROCEDURE — 1160F RVW MEDS BY RX/DR IN RCRD: CPT | Mod: CPTII,S$GLB,, | Performed by: PSYCHIATRY & NEUROLOGY

## 2025-02-03 PROCEDURE — 99214 OFFICE O/P EST MOD 30 MIN: CPT | Mod: S$GLB,,, | Performed by: PSYCHIATRY & NEUROLOGY

## 2025-02-03 PROCEDURE — 1159F MED LIST DOCD IN RCRD: CPT | Mod: CPTII,S$GLB,, | Performed by: PSYCHIATRY & NEUROLOGY

## 2025-02-03 PROCEDURE — 3075F SYST BP GE 130 - 139MM HG: CPT | Mod: CPTII,S$GLB,, | Performed by: PSYCHIATRY & NEUROLOGY

## 2025-02-03 NOTE — TELEPHONE ENCOUNTER
"Patient has scheduled follow up for 3/24/2025, desires to know if it would be possible to have surgery on 3/28/2025, states "have to pay for it" "take insurance out of the equation" when advised prior authorization may not be complete at that time. 3/24/2025 appointment placed on wait-laist due to patient desires sooner appointment, available for appointments on Monday's before 10am or Friday's anytime. Notified patient message routed to 's staff to advise. Patient verbalized understanding.   "
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
## 2025-02-13 ENCOUNTER — OFFICE VISIT (OUTPATIENT)
Dept: PAIN MEDICINE | Facility: CLINIC | Age: 70
End: 2025-02-13
Payer: COMMERCIAL

## 2025-02-13 ENCOUNTER — TELEPHONE (OUTPATIENT)
Dept: PAIN MEDICINE | Facility: CLINIC | Age: 70
End: 2025-02-13
Payer: COMMERCIAL

## 2025-02-13 VITALS
SYSTOLIC BLOOD PRESSURE: 121 MMHG | BODY MASS INDEX: 35.65 KG/M2 | HEIGHT: 65 IN | OXYGEN SATURATION: 94 % | HEART RATE: 75 BPM | DIASTOLIC BLOOD PRESSURE: 71 MMHG | WEIGHT: 214 LBS

## 2025-02-13 DIAGNOSIS — M54.17 LUMBOSACRAL RADICULOPATHY: Primary | ICD-10-CM

## 2025-02-13 DIAGNOSIS — M54.16 LUMBAR RADICULITIS: ICD-10-CM

## 2025-02-13 DIAGNOSIS — G57.11 MERALGIA PARAESTHETICA, RIGHT: ICD-10-CM

## 2025-02-13 PROCEDURE — 99999 PR PBB SHADOW E&M-EST. PATIENT-LVL III: CPT | Mod: PBBFAC,,, | Performed by: ANESTHESIOLOGY

## 2025-02-13 NOTE — PROGRESS NOTES
[facility] EST Patient Evaluation    Sal Rowley  : 1955  Date: 2025     CHIEF COMPLAINT:  Low-back Pain and Discuss procedure  Referring Physician: No ref. provider found  Primary Care Physician: Justice Mccarty MD    HPI:  This is a 69 y.o. male with a chief complaint of Low-back Pain and Discuss procedure  . The patient has Past medical history/Past surgical history of left rotator cuff muscle tear, hypertension, hyperlipidemia, acid reflux    Patient was evaluated and referred by Neurology provider for lumbar radiculopathy.    He had EMG/nerve conduction study completed in 2024 that showed chronic and mild lumbosacral radiculopathy from L3-S1 bilaterally.    He started physical therapy in 2024 with mild relief  MRI lumbar spine updated in 2023 showed grade 1 retrolisthesis and anterolisthesis across the lumbar spine.  Has multilevel facet arthropathy with mild-to-moderate neural foramina narrowing at multiple levels worse at L4-L5  Does not take any pain medication  Never tried gabapentin or Lyrica before.    Interval History 2024:  Sal Rowley is here for follow up visit after lumbar epidural steroid injection at L4-5 in , he reported 50% improvement in pain and functionality after injection for back pain symptoms however he still has numbness in the right leg  Was seen by Dr. Ward 2024,  for numbness and burning in right LE.  Current pain score: 0/10 Spine       Interval History 2024:  Sal Rowley is here for procedure follow up visit after right L4 and L5 transforaminal epidural steroid injection in ,  patient reported 80% for his low back pain, howerver it did not help Rt thigh numbness.  Current pain score: 0/10    Interval History 2025:  Sal Rowley is here to discuss appropriate procedure for his right thigh numbness.  Current pain score: 0/10    Diabetic:  No    Anticogualtion drugs: None    Allergy To Iodine: No    Currently on Antibiotic: No    Current Description of Pain Symptoms:  History of Recent Fall or Trauma: No   Onset: Chronic, started 11/2021 after accident  Pain Location: Very mild lower back  Radiates/associated symptoms:  Anterior aspect of right thigh.   Pain is Getting worse over the last 3 months    The pain is described as aching, numbing, and stabbing.   Exacerbating factors: Standing  and sitting for a long period of time  Mitigating factors: changing position and laying down   Symptoms interfere with daily activity, sleeping, and work.   The patient feels like symptoms have been unchanged  Patient denies night fever/night sweats, urinary incontinence, bowel incontinence, significant weight loss, significant motor weakness, and loss of sensations.    Pain score:   Best: 1/10  Worst: 10/10    Current pain medications:  - lidocaine patches    Current Narcotics/Opioid /benzo Medications:  Opioids- None  Benzodiazepines: No    UDS:  NA    PDMP:  Reviewed and consistent with medication use as prescribed.   Previous Chronic Pain Treatment History:  Physical Therapy/HEP/Physician Lead Exercise Program:  Over the past 12 months, Patient has done  6+ sessions.  PT response: Not Helpful.   Dates of the PT sessions: 2/2024  Is patient participating in home exercise program (HEP): No.    Non-interventional Pain Therapy:  []Chiropractor.   []Acupuncture/Dry needle.  []TENS unit.  []Heat/ICE.  []Back Brace.    Medications previously tried:  NSAIDs: Ibuprofen (Advil/Motrin) and Celebrex (Celecoxib)  Topical Agent: No  TCA/SSRI/SNRI: None  Anti-convulsants: None  Muscle Relaxants: None  Opioids- Tramadol (Ultram).    Interventional Pain Procedures:  04/2024:  lumbar epidural steroid injection at L4-5, 50% improvement in pain and functionality  08/2024: right L4 and L5 transforaminal epidural steroid injection -  more relief for his low back pain, no relief for  "right thigh numbness    Previous spine/Relevant joint surgery:  none  Surgical History:   has a past surgical history that includes Knee arthroscopy; Tonsillectomy; Rotator cuff repair (Right); Carpal tunnel release (Left, 3/27/2023); Carpal tunnel release (Right, 4/10/2023); Colonoscopy (N/A, 7/25/2023); Epidural steroid injection into lumbar spine (N/A, 4/12/2024); and Transforaminal epidural injection of steroid (Right, 8/23/2024).  Medical History:   has a past medical history of Hypertension, Mixed hyperlipidemia, and Osteoarthritis.  Family History:  family history is not on file.  Allergies:  Patient has no known allergies.   Social History/SUBSTANCE ABUSE HISTORY:  Personal history of substance abuse: No   reports that he quit smoking about 35 years ago. His smoking use included cigarettes. He has been exposed to tobacco smoke. He does not have any smokeless tobacco history on file. He reports current alcohol use. He reports that he does not use drugs.  LABS:  CBC  Lab Results   Component Value Date    WBC 7.88 09/30/2024    HGB 15.5 09/30/2024    HCT 44.5 09/30/2024     Coagulation Profile   Lab Results   Component Value Date     09/30/2024     No results found for: "PT", "PTT", "INR"  CMP:  BMP  Lab Results   Component Value Date     09/30/2024    K 4.5 09/30/2024     09/30/2024    CO2 26 09/30/2024    BUN 22 09/30/2024    CREATININE 1.1 09/30/2024    CALCIUM 9.6 09/30/2024    ANIONGAP 8 09/30/2024    EGFRNORACEVR >60 09/30/2024     Lab Results   Component Value Date    ALT 43 09/30/2024    AST 39 09/30/2024    ALKPHOS 50 (L) 09/30/2024    BILITOT 0.3 09/30/2024     HGBA1C:  Lab Results   Component Value Date    HGBA1C 5.8 (H) 09/29/2023   ROS:    Review of Systems   GENERAL:  No weight loss, malaise or fevers.  HEENT:   No recent changes in vision or hearing  NECK:  Negative for lumps, no difficulty with swallowing.  RESPIRATORY:  Negative for cough, wheezing or shortness of breath, " "patient denies any recent URI.  CARDIOVASCULAR:  Negative for chest pain, leg swelling or palpitations.  GI:  Negative for abdominal discomfort, blood in stools or black stools or change in bowel habits.  MUSCULOSKELETAL:  See HPI.  SKIN:  Negative for lesions, rash, and itching.  PSYCH:  No mood disorder or recent psychosocial stressors.   HEMATOLOGY/LYMPHOLOGY:  See the blood thinner sectioned in HPI.  NEURO:  See HPI  All other reviewed and negative other than HPI.    PHYSICAL EXAM:  VITALS: /71 (BP Location: Right arm, Patient Position: Sitting)   Pulse 75   Ht 5' 5" (1.651 m)   Wt 97.1 kg (214 lb)   SpO2 (!) 94%   BMI 35.61 kg/m²   Body mass index is 35.61 kg/m².  GENERAL: Well appearing, in no acute distress, alert and oriented x3, answers questions appropriately.   PSYCH: Flat affect.  SKIN: Skin color, texture, turgor normal, no rashes or lesions.  HEAD/FACE:  Normocephalic, atraumatic. Cranial nerves grossly intact.  CV: Regular rate  PULM: No evidence of respiratory difficulty, symmetric chest rise.  GI:  Soft and non-Distended.  BACK/SIJ/HIP:  Lumbar Spine Exam:       Inspection: No erythema, bruising.       Palpation: (+) TTP of lumbar paraspinals bilaterally      ROM:  Limited in flexion, extension, lateral bending.       (mild +) Facet loading bilaterally      (+) Straight Leg Raise bilaterally      (-) DHAVAL bilaterally, Tenderness over the PSIS, Yeoman test  Hip Exam:      Inspection: No gross deformity or apparent leg length discrepancy      Palpation:  No TTP to bilateral greater trochanteric bursas,  decreased sensation of the lateral aspect right thigh      ROM:  No limitation or pain in internal rotation, external rotation b/l  Neurologic Exam:     Alert. Speech is fluent and appropriate.     Strength: 5/5 in BL hip flexion and knee extension,      Sensation:  Grossly intact to light touch in bilateral lower extremities     Tone: No abnormality appreciated in bilateral lower " extremities  GAIT:  Normal gait    DIAGNOSTIC STUDIES AND MEDICAL RECORDS REVIEW:    EMG/NCS   Chronic (more than 6 weeks old) and mild lumbosacral radiculopathy from L3-S1 bilaterally    MRI lumbar spine 2023  Marrow signal is within normal limits.  The conus lies at the L1 vertebral body level.  There is no evidence clumping of the nerve roots.     L1/2: There is grade 1 retrolisthesis of L1 with respect L2.  There is a circumferential disc bulge resulting in mild narrowing of bilateral neural foramina and mild narrowing of the central canal.     L2/3: There is grade 1 retrolisthesis of L2 with respect L3 and a circumferential disc bulge resulting in mild narrowing of bilateral neural foramina and mild narrowing of the central canal.     L3/4: There is a circumferential disc bulge resulting in moderate narrowing of bilateral neural foramina and moderate narrowing of the central canal.     L4/L5: There is grade 1 anterolisthesis of L4 with respect L5 and a circumferential disc bulge resulting in moderate narrowing of bilateral neural foramina and moderate central canal narrowing.  There is bilateral facet arthropathy.     L5/S1: There is grade 1 anterolisthesis of L5 with respect S1 and a circumferential disc protrusion with moderate narrowing of bilateral neural foramina.     X-ray lumbar spine flexion-extension 2024 showed There is retrolisthesis of T12 on L1, L1 on L2, L2 on L3 and L3 on L4 with anterolisthesis of L4 on L5 and L5 on S1. The anterolisthesis at L4-5 and L5-S1 appears to worsen on flexion imaging and improves on extension imaging concerning for ligamentous instability. There is also slight worsening of retrolisthesis in the upper lumbar spine on extension imaging concerning for ligamentous instability.      ASSESSMENT:  Sal Rowley is a 69 y.o. male with the following diagnoses based on history, exam, and imagin. Lumbosacral radiculopathy    2. Meralgia paraesthetica,  right    3. Lumbar radiculitis    This is a pleasant 69 y.o. male patient with PMH  left rotator cuff muscle tear, hypertension, hyperlipidemia, acid reflux, presenting with low back pain stabbing and aching in nature in addition to anterior- lateral aspect right thigh numbness and tingling that has been worsening since the accident 2021, patient had is completed multiple sessions of physical therapy in 2023 without significant improvement in pain and functionality, had MRI lumbar spine that show multilevel anteriorlisthesis and retrolisthesis, facet arthropathy, moderate bilateral neural foramina narrowing and moderate central canal stenosis most specifically at L4-L5, he had lumbar epidural steroid injection at L4-L5 with a  moderate improvement 50% in his low back pain however his numbness in his right lower extremity is still bothering him,  followed by right L4 and L5 transforaminal epidural steroid injection  which helped his pain significantly up to 80% improvement in pain and functionality,  however no relief for his right thigh numbness, I believe most of his pain comes from lateral femoral cutaneous nerve versus  right L2-L3  radiculopathy    Treatment Plan:    Diagnostics/Referrals:  HEP    Medications:    NSAIDs: None  Topical Agent: Lidocaine patch + Compound cream  TCA/SSRI/SNRI: None  Anti-convulsants: None  Muscle Relaxants: None  Opioids: None    Interventional Therapy: Please schedule for Right L2 and L3 TFESI  Sedation:  IV sedation  Clearance to stop Blood thinner:  None    Regarding the above interventions, the patient has been educated regarding the risks (including bleeding, infection, increased pain, nerve damage, or allergic reaction), benefits, and alternatives. The patient states he understands and is eager to proceed.    Physical Rehabilitation:  HEP    Patient Education: Counseled patient regarding the importance of activity modification, I have stressed the importance of physical  activity and a home exercise plan to help with pain and improve health.    Follow-up: RTC as discussed     May consider:   may consider left femoral cutaneous nerve block    Josef Mccauley MD  Anesthesiologist  Interventional Pain Medicine  02/13/2025    Disclaimer:  This note was prepared using voice recognition system and is likely to have sound alike errors that may have been overlooked even after proof reading.  Please call me with any questions.

## 2025-02-13 NOTE — TELEPHONE ENCOUNTER
----- Message from Josef Mccauley MD sent at 2/13/2025  3:43 PM CST -----  · Interventional Therapy: Please schedule for Right L2 and L3 TFESI  · Sedation:  IV sedation  No AC  4weeks follow up

## 2025-02-13 NOTE — PROGRESS NOTES
[facility] EST Patient Evaluation    Sal Rowley  : 1955  Date: 2025     CHIEF COMPLAINT:  No chief complaint on file.  Referring Physician: No ref. provider found  Primary Care Physician: Justice Mccarty MD    HPI:  This is a 69 y.o. male with a chief complaint of No chief complaint on file.  . The patient has Past medical history/Past surgical history of left rotator cuff muscle tear, hypertension, hyperlipidemia, acid reflux    Patient was evaluated and referred by Neurology provider for lumbar radiculopathy.    He had EMG/nerve conduction study completed in 2024 that showed chronic and mild lumbosacral radiculopathy from L3-S1 bilaterally.    He started physical therapy in 2024 with mild relief  MRI lumbar spine updated in 2023 showed grade 1 retrolisthesis and anterolisthesis across the lumbar spine.  Has multilevel facet arthropathy with mild-to-moderate neural foramina narrowing at multiple levels worse at L4-L5  Does not take any pain medication  Never tried gabapentin or Lyrica before.    Interval History 2024:  Sal Rowley is here for follow up visit after lumbar epidural steroid injection at L4-5 in , he reported 50% improvement in pain and functionality after injection for back pain symptoms however he still has numbness in the right leg  Was seen by Dr. Ward 2024,  for numbness and burning in right LE.  Current pain score: 0/10 Spine       Interval History 2024:  Sal Rowley is here for procedure follow up visit after right L4 and L5 transforaminal epidural steroid injection in ,  patient reported 80% for his low back pain, howerver it did not help Rt thigh numbness.  Current pain score: 0/10      Diabetic: No    Anticogualtion drugs: None    Allergy To Iodine: No    Currently on Antibiotic: No    Current Description of Pain Symptoms:  History of Recent Fall or Trauma: No   Onset:  Chronic, started 11/2021 after accident  Pain Location: Very mild lower back  Radiates/associated symptoms:  Anterior aspect of right thigh.   Pain is Getting worse over the last 3 months    The pain is described as aching, numbing, and stabbing.   Exacerbating factors: Standing  and sitting for a long period of time  Mitigating factors:  changing position.  Symptoms interfere with daily activity, sleeping, and work.   The patient feels like symptoms have been slightly improving  Patient denies night fever/night sweats, urinary incontinence, bowel incontinence, significant weight loss, significant motor weakness, and loss of sensations.    Pain score:   Best: 1/10  Worst: 10/10    Current pain medications:  NA    Current Narcotics/Opioid /benzo Medications:  Opioids- None  Benzodiazepines: No    UDS:  NA    PDMP:  Reviewed and consistent with medication use as prescribed.   Previous Chronic Pain Treatment History:  Physical Therapy/HEP/Physician Lead Exercise Program:  Over the past 12 months, Patient has done  6+ sessions.  PT response: Not Helpful.   Dates of the PT sessions: 2/2024  Is patient participating in home exercise program (HEP): No.    Non-interventional Pain Therapy:  []Chiropractor.   []Acupuncture/Dry needle.  []TENS unit.  []Heat/ICE.  []Back Brace.    Medications previously tried:  NSAIDs: Ibuprofen (Advil/Motrin) and Celebrex (Celecoxib)  Topical Agent: No  TCA/SSRI/SNRI: None  Anti-convulsants: None  Muscle Relaxants: None  Opioids- Tramadol (Ultram).    Interventional Pain Procedures:  04/2024:  lumbar epidural steroid injection at L4-5, 50% improvement in pain and functionality  08/2024: right L4 and L5 transforaminal epidural steroid injection -  more relief for his low back pain, no relief for right thigh numbness  Previous spine/Relevant joint surgery:  none  Surgical History:   has a past surgical history that includes Knee arthroscopy; Tonsillectomy; Rotator cuff repair (Right); Carpal  "tunnel release (Left, 3/27/2023); Carpal tunnel release (Right, 4/10/2023); Colonoscopy (N/A, 7/25/2023); Epidural steroid injection into lumbar spine (N/A, 4/12/2024); and Transforaminal epidural injection of steroid (Right, 8/23/2024).  Medical History:   has a past medical history of Hypertension, Mixed hyperlipidemia, and Osteoarthritis.  Family History:  family history is not on file.  Allergies:  Patient has no known allergies.   Social History/SUBSTANCE ABUSE HISTORY:  Personal history of substance abuse: No   reports that he quit smoking about 35 years ago. His smoking use included cigarettes. He has been exposed to tobacco smoke. He does not have any smokeless tobacco history on file. He reports current alcohol use. He reports that he does not use drugs.  LABS:  CBC  Lab Results   Component Value Date    WBC 7.88 09/30/2024    HGB 15.5 09/30/2024    HCT 44.5 09/30/2024     Coagulation Profile   Lab Results   Component Value Date     09/30/2024     No results found for: "PT", "PTT", "INR"  CMP:  BMP  Lab Results   Component Value Date     09/30/2024    K 4.5 09/30/2024     09/30/2024    CO2 26 09/30/2024    BUN 22 09/30/2024    CREATININE 1.1 09/30/2024    CALCIUM 9.6 09/30/2024    ANIONGAP 8 09/30/2024    EGFRNORACEVR >60 09/30/2024     Lab Results   Component Value Date    ALT 43 09/30/2024    AST 39 09/30/2024    ALKPHOS 50 (L) 09/30/2024    BILITOT 0.3 09/30/2024     HGBA1C:  Lab Results   Component Value Date    HGBA1C 5.8 (H) 09/29/2023   ROS:    Review of Systems   GENERAL:  No weight loss, malaise or fevers.  HEENT:   No recent changes in vision or hearing  NECK:  Negative for lumps, no difficulty with swallowing.  RESPIRATORY:  Negative for cough, wheezing or shortness of breath, patient denies any recent URI.  CARDIOVASCULAR:  Negative for chest pain, leg swelling or palpitations.  GI:  Negative for abdominal discomfort, blood in stools or black stools or change in bowel " habits.  MUSCULOSKELETAL:  See HPI.  SKIN:  Negative for lesions, rash, and itching.  PSYCH:  No mood disorder or recent psychosocial stressors.   HEMATOLOGY/LYMPHOLOGY:  See the blood thinner sectioned in HPI.  NEURO:  See HPI  All other reviewed and negative other than HPI.    PHYSICAL EXAM:  VITALS: There were no vitals taken for this visit.  There is no height or weight on file to calculate BMI.  GENERAL: Well appearing, in no acute distress, alert and oriented x3, answers questions appropriately.   PSYCH: Flat affect.  SKIN: Skin color, texture, turgor normal, no rashes or lesions.  HEAD/FACE:  Normocephalic, atraumatic. Cranial nerves grossly intact.  CV: Regular rate  PULM: No evidence of respiratory difficulty, symmetric chest rise.  GI:  Soft and non-Distended.  BACK/SIJ/HIP:  Lumbar Spine Exam:       Inspection: No erythema, bruising.       Palpation: (+) TTP of lumbar paraspinals bilaterally      ROM:  Limited in flexion, extension, lateral bending.       (mild +) Facet loading bilaterally      (+) Straight Leg Raise bilaterally      (-) DHAVAL bilaterally, Tenderness over the PSIS, Yeoman test  Hip Exam:      Inspection: No gross deformity or apparent leg length discrepancy      Palpation:  No TTP to bilateral greater trochanteric bursas,  decreased sensation of the lateral aspect right thigh      ROM:  No limitation or pain in internal rotation, external rotation b/l  Neurologic Exam:     Alert. Speech is fluent and appropriate.     Strength: 5/5 in BL hip flexion and knee extension,      Sensation:  Grossly intact to light touch in bilateral lower extremities     Tone: No abnormality appreciated in bilateral lower extremities  GAIT:  Normal gait    DIAGNOSTIC STUDIES AND MEDICAL RECORDS REVIEW:    EMG/NCS 2024  Chronic (more than 6 weeks old) and mild lumbosacral radiculopathy from L3-S1 bilaterally    MRI lumbar spine 12/2023  Marrow signal is within normal limits.  The conus lies at the L1 vertebral  body level.  There is no evidence clumping of the nerve roots.     L1/2: There is grade 1 retrolisthesis of L1 with respect L2.  There is a circumferential disc bulge resulting in mild narrowing of bilateral neural foramina and mild narrowing of the central canal.     L2/3: There is grade 1 retrolisthesis of L2 with respect L3 and a circumferential disc bulge resulting in mild narrowing of bilateral neural foramina and mild narrowing of the central canal.     L3/4: There is a circumferential disc bulge resulting in moderate narrowing of bilateral neural foramina and moderate narrowing of the central canal.     L4/L5: There is grade 1 anterolisthesis of L4 with respect L5 and a circumferential disc bulge resulting in moderate narrowing of bilateral neural foramina and moderate central canal narrowing.  There is bilateral facet arthropathy.     L5/S1: There is grade 1 anterolisthesis of L5 with respect S1 and a circumferential disc protrusion with moderate narrowing of bilateral neural foramina.     X-ray lumbar spine flexion-extension 03/2024 showed There is retrolisthesis of T12 on L1, L1 on L2, L2 on L3 and L3 on L4 with anterolisthesis of L4 on L5 and L5 on S1. The anterolisthesis at L4-5 and L5-S1 appears to worsen on flexion imaging and improves on extension imaging concerning for ligamentous instability. There is also slight worsening of retrolisthesis in the upper lumbar spine on extension imaging concerning for ligamentous instability.      ASSESSMENT:  Sal Rowley is a 69 y.o. male with the following diagnoses based on history, exam, and imaging:  There are no diagnoses linked to this encounter.    This is a pleasant 69 y.o. male patient with PMH  left rotator cuff muscle tear, hypertension, hyperlipidemia, acid reflux, presenting with low back pain stabbing and aching in nature in addition to anterior- lateral aspect right thigh numbness and tingling that has been worsening since the accident 2021,  patient had is completed multiple sessions of physical therapy in 2023 without significant improvement in pain and functionality, had MRI lumbar spine that show multilevel anteriorlisthesis and retrolisthesis, facet arthropathy, moderate bilateral neural foramina narrowing and moderate central canal stenosis most specifically at L4-L5, he had lumbar epidural steroid injection at L4-L5 with a  moderate improvement 50% in his low back pain however his numbness in his right lower extremity is still bothering him,  followed by right L4 and L5 transforaminal epidural steroid injection  which helped his pain significantly up to 80% improvement in pain and functionality,  however no relief for his right thigh numbness, I believe most of his pain comes from lateral femoral cutaneous nerve versus  right L2-L3  radiculopathy    Treatment Plan:    Diagnostics/Referrals:  HEP    Medications:    NSAIDs: None  Topical Agent: Lidocaine patch + Compound cream  TCA/SSRI/SNRI: None  Anti-convulsants: None  Muscle Relaxants: None  Opioids: None    Interventional Therapy: Please schedule for Right lateral femoral cutaneous nerve block, US guided upon his request.  Sedation:  Oral sedation  Clearance to stop Blood thinner:  None    Regarding the above interventions, the patient has been educated regarding the risks (including bleeding, infection, increased pain, nerve damage, or allergic reaction), benefits, and alternatives. The patient states he understands and is eager to proceed.    Physical Rehabilitation:  Continuing physical therapy.    Patient Education: Counseled patient regarding the importance of activity modification, I have stressed the importance of physical activity and a home exercise plan to help with pain and improve health.    Follow-up: RTC 2-3 weeks after injection    May consider:   right L2-L3 transforaminal.    Josef Mccauley MD  Anesthesiologist  Interventional Pain Medicine  02/13/2025    Disclaimer:  This note  was prepared using voice recognition system and is likely to have sound alike errors that may have been overlooked even after proof reading.  Please call me with any questions.

## 2025-03-31 ENCOUNTER — OFFICE VISIT (OUTPATIENT)
Dept: FAMILY MEDICINE | Facility: CLINIC | Age: 70
End: 2025-03-31
Payer: COMMERCIAL

## 2025-03-31 ENCOUNTER — CLINICAL SUPPORT (OUTPATIENT)
Dept: FAMILY MEDICINE | Facility: CLINIC | Age: 70
End: 2025-03-31
Payer: COMMERCIAL

## 2025-03-31 VITALS
WEIGHT: 215.38 LBS | HEART RATE: 74 BPM | OXYGEN SATURATION: 97 % | RESPIRATION RATE: 17 BRPM | HEIGHT: 65 IN | BODY MASS INDEX: 35.88 KG/M2 | DIASTOLIC BLOOD PRESSURE: 70 MMHG | SYSTOLIC BLOOD PRESSURE: 110 MMHG

## 2025-03-31 DIAGNOSIS — R05.3 CHRONIC COUGH: ICD-10-CM

## 2025-03-31 DIAGNOSIS — M54.17 LUMBOSACRAL RADICULOPATHY: ICD-10-CM

## 2025-03-31 DIAGNOSIS — M25.519 SHOULDER PAIN, UNSPECIFIED CHRONICITY, UNSPECIFIED LATERALITY: ICD-10-CM

## 2025-03-31 DIAGNOSIS — E66.01 SEVERE OBESITY (BMI 35.0-39.9) WITH COMORBIDITY: Primary | ICD-10-CM

## 2025-03-31 DIAGNOSIS — E78.2 MIXED HYPERLIPIDEMIA: ICD-10-CM

## 2025-03-31 DIAGNOSIS — M43.16 SPONDYLOLISTHESIS OF LUMBAR REGION: ICD-10-CM

## 2025-03-31 DIAGNOSIS — N52.9 ERECTILE DYSFUNCTION, UNSPECIFIED ERECTILE DYSFUNCTION TYPE: ICD-10-CM

## 2025-03-31 DIAGNOSIS — K63.5 POLYP OF DESCENDING COLON, UNSPECIFIED TYPE: ICD-10-CM

## 2025-03-31 DIAGNOSIS — I10 ESSENTIAL HYPERTENSION: ICD-10-CM

## 2025-03-31 DIAGNOSIS — E78.2 MIXED HYPERLIPIDEMIA: Primary | ICD-10-CM

## 2025-03-31 LAB
ALBUMIN SERPL BCP-MCNC: 4.1 G/DL (ref 3.5–5.2)
ALP SERPL-CCNC: 52 UNIT/L (ref 40–150)
ALT SERPL W/O P-5'-P-CCNC: 51 UNIT/L (ref 10–44)
ANION GAP (OHS): 12 MMOL/L (ref 8–16)
AST SERPL-CCNC: 53 UNIT/L (ref 11–45)
BILIRUB SERPL-MCNC: 0.5 MG/DL (ref 0.1–1)
BUN SERPL-MCNC: 18 MG/DL (ref 8–23)
CALCIUM SERPL-MCNC: 9.3 MG/DL (ref 8.7–10.5)
CHLORIDE SERPL-SCNC: 102 MMOL/L (ref 95–110)
CO2 SERPL-SCNC: 23 MMOL/L (ref 23–29)
CREAT SERPL-MCNC: 1.1 MG/DL (ref 0.5–1.4)
GFR SERPLBLD CREATININE-BSD FMLA CKD-EPI: >60 ML/MIN/1.73/M2
GLUCOSE SERPL-MCNC: 113 MG/DL (ref 70–110)
POTASSIUM SERPL-SCNC: 4.9 MMOL/L (ref 3.5–5.1)
PROT SERPL-MCNC: 8.4 GM/DL (ref 6–8.4)
SODIUM SERPL-SCNC: 137 MMOL/L (ref 136–145)

## 2025-03-31 PROCEDURE — 80053 COMPREHEN METABOLIC PANEL: CPT

## 2025-03-31 PROCEDURE — 99999 PR PBB SHADOW E&M-EST. PATIENT-LVL IV: CPT | Mod: PBBFAC,,, | Performed by: FAMILY MEDICINE

## 2025-03-31 PROCEDURE — 36415 COLL VENOUS BLD VENIPUNCTURE: CPT

## 2025-03-31 PROCEDURE — 36415 COLL VENOUS BLD VENIPUNCTURE: CPT | Mod: S$GLB,,, | Performed by: FAMILY MEDICINE

## 2025-03-31 RX ORDER — SILDENAFIL 25 MG/1
25 TABLET, FILM COATED ORAL DAILY PRN
Qty: 20 TABLET | Refills: 5 | Status: SHIPPED | OUTPATIENT
Start: 2025-03-31 | End: 2026-03-31

## 2025-03-31 NOTE — PROGRESS NOTES
Subjective:       Patient ID: Sal Rowley is a 69 y.o. male.    Chief Complaint: Follow-up (Pt here for 6 mth f/u. )    Pt is a 69 y.o. male who presents for evaluation and management of   Encounter Diagnoses   Name Primary?    Erectile dysfunction, unspecified erectile dysfunction type     Severe obesity (BMI 35.0-39.9) with comorbidity Yes    Spondylolisthesis of lumbar region     Shoulder pain, unspecified chronicity, unspecified laterality     Polyp of descending colon, unspecified type     Lumbosacral radiculopathy     Essential hypertension     Mixed hyperlipidemia     Chronic cough    .  Doing well on current meds. Denies any side effects. Prevention is up to date.    Review of Systems   Constitutional:  Negative for fever.   Respiratory:  Positive for cough. Negative for shortness of breath.    Cardiovascular:  Negative for chest pain.   Gastrointestinal:  Negative for anal bleeding and blood in stool.   Genitourinary:  Negative for dysuria.   Neurological:  Negative for dizziness and light-headedness.       Objective:      Physical Exam  Constitutional:       Appearance: Normal appearance. He is well-developed. He is not ill-appearing.   HENT:      Head: Normocephalic and atraumatic.      Right Ear: External ear normal.      Left Ear: External ear normal.      Nose: Nose normal.      Mouth/Throat:      Mouth: Mucous membranes are moist.      Pharynx: No oropharyngeal exudate or posterior oropharyngeal erythema.   Eyes:      General: No scleral icterus.        Right eye: No discharge.         Left eye: No discharge.      Conjunctiva/sclera: Conjunctivae normal.      Pupils: Pupils are equal, round, and reactive to light.   Neck:      Thyroid: No thyromegaly.      Vascular: No JVD.      Trachea: No tracheal deviation.   Cardiovascular:      Rate and Rhythm: Normal rate and regular rhythm.      Heart sounds: Normal heart sounds. No murmur heard.  Pulmonary:      Effort: Pulmonary effort is normal. No  respiratory distress.      Breath sounds: Normal breath sounds. No wheezing or rales.   Chest:      Chest wall: No tenderness.   Abdominal:      General: Bowel sounds are normal. There is no distension.      Palpations: Abdomen is soft. There is no mass.      Tenderness: There is no abdominal tenderness. There is no guarding or rebound.   Musculoskeletal:         General: Normal range of motion.      Cervical back: Normal range of motion and neck supple.      Right lower leg: No edema.      Left lower leg: No edema.   Lymphadenopathy:      Cervical: No cervical adenopathy.   Skin:     General: Skin is warm and dry.   Neurological:      Mental Status: He is alert and oriented to person, place, and time.      Cranial Nerves: No cranial nerve deficit.      Motor: No abnormal muscle tone.      Coordination: Coordination normal.      Deep Tendon Reflexes: Reflexes are normal and symmetric. Reflexes normal.   Psychiatric:         Behavior: Behavior normal.         Thought Content: Thought content normal.         Judgment: Judgment normal.         Assessment:       1. Severe obesity (BMI 35.0-39.9) with comorbidity    2. Erectile dysfunction, unspecified erectile dysfunction type    3. Spondylolisthesis of lumbar region    4. Shoulder pain, unspecified chronicity, unspecified laterality    5. Polyp of descending colon, unspecified type    6. Lumbosacral radiculopathy    7. Essential hypertension    8. Mixed hyperlipidemia    9. Chronic cough        Plan:   1. Severe obesity (BMI 35.0-39.9) with comorbidity  Overview:  The patient is asked to make an attempt to improve diet and exercise patterns to aid in medical management of this problem.  Cut out white carbs: bread, rice, pasta, potatoes. Exercise/walk 5x/week for at least 30 minutes  .        2. Erectile dysfunction, unspecified erectile dysfunction type  -     sildenafiL (VIAGRA) 25 MG tablet; Take 1 tablet (25 mg total) by mouth daily as needed for Erectile  Dysfunction.  Dispense: 20 tablet; Refill: 5    3. Spondylolisthesis of lumbar region  Overview:  Has some right thigh pain with standing, Parisa is to perform procedure on him Friday.        4. Shoulder pain, unspecified chronicity, unspecified laterality  Overview:  Reverse shoulder replacement with Dr. Ponce pending       5. Polyp of descending colon, unspecified type  Overview:  Good til 2028       6. Lumbosacral radiculopathy  Overview:  Has procedure with Dr. Mccauley this Friday         7. Essential hypertension  Overview:  Benicar 40mg       8. Mixed hyperlipidemia  Overview:  Lab Results   Component Value Date    LDLCALC 95.8 09/30/2024     Lipitor 20mg       Orders:  -     Comprehensive Metabolic Panel; Future; Expected date: 03/31/2025    9. Chronic cough  Overview:  Remote h/o smoking. Quit almost 30 years ago   His previous PCP put him on Trellegy which has helped some with the cough. He called him COPD, per pt.   His PFT's from 1/2023 show no obstructive pattern. He has moderate restriction.   Trial of PPI 6/2023 has helped reflux symptoms but not cough.           Orders:  -     CT Chest Without Contrast; Future; Expected date: 03/31/2025      No follow-ups on file.       No

## 2025-04-01 ENCOUNTER — RESULTS FOLLOW-UP (OUTPATIENT)
Dept: FAMILY MEDICINE | Facility: CLINIC | Age: 70
End: 2025-04-01

## 2025-04-04 ENCOUNTER — HOSPITAL ENCOUNTER (OUTPATIENT)
Dept: RADIOLOGY | Facility: HOSPITAL | Age: 70
Discharge: HOME | End: 2025-04-04
Attending: ANESTHESIOLOGY | Admitting: ANESTHESIOLOGY
Payer: COMMERCIAL

## 2025-04-04 ENCOUNTER — HOSPITAL ENCOUNTER (OUTPATIENT)
Facility: HOSPITAL | Age: 70
Discharge: HOME | End: 2025-04-04
Attending: ANESTHESIOLOGY | Admitting: ANESTHESIOLOGY
Payer: COMMERCIAL

## 2025-04-04 VITALS
OXYGEN SATURATION: 98 % | DIASTOLIC BLOOD PRESSURE: 77 MMHG | RESPIRATION RATE: 16 BRPM | WEIGHT: 206 LBS | SYSTOLIC BLOOD PRESSURE: 145 MMHG | BODY MASS INDEX: 34.32 KG/M2 | HEART RATE: 71 BPM | HEIGHT: 65 IN | TEMPERATURE: 98 F

## 2025-04-04 DIAGNOSIS — R52 PAIN: ICD-10-CM

## 2025-04-04 DIAGNOSIS — M54.17 LUMBOSACRAL RADICULOPATHY: Primary | ICD-10-CM

## 2025-04-04 DIAGNOSIS — M54.17 LUMBOSACRAL RADICULOPATHY: ICD-10-CM

## 2025-04-04 PROCEDURE — 64484 NJX AA&/STRD TFRM EPI L/S EA: CPT | Mod: RT | Performed by: ANESTHESIOLOGY

## 2025-04-04 PROCEDURE — 76000 FLUOROSCOPY <1 HR PHYS/QHP: CPT | Mod: TC

## 2025-04-04 PROCEDURE — 64483 NJX AA&/STRD TFRM EPI L/S 1: CPT | Mod: RT,,, | Performed by: ANESTHESIOLOGY

## 2025-04-04 PROCEDURE — 64484 NJX AA&/STRD TFRM EPI L/S EA: CPT | Mod: RT,,, | Performed by: ANESTHESIOLOGY

## 2025-04-04 PROCEDURE — 63600175 PHARM REV CODE 636 W HCPCS: Performed by: ANESTHESIOLOGY

## 2025-04-04 PROCEDURE — 25500020 PHARM REV CODE 255: Performed by: ANESTHESIOLOGY

## 2025-04-04 PROCEDURE — 64483 NJX AA&/STRD TFRM EPI L/S 1: CPT | Mod: RT | Performed by: ANESTHESIOLOGY

## 2025-04-04 RX ORDER — FENTANYL CITRATE 50 UG/ML
INJECTION, SOLUTION INTRAMUSCULAR; INTRAVENOUS
Status: DISCONTINUED | OUTPATIENT
Start: 2025-04-04 | End: 2025-04-04 | Stop reason: HOSPADM

## 2025-04-04 RX ORDER — MIDAZOLAM HYDROCHLORIDE 1 MG/ML
INJECTION INTRAMUSCULAR; INTRAVENOUS
Status: DISCONTINUED | OUTPATIENT
Start: 2025-04-04 | End: 2025-04-04 | Stop reason: HOSPADM

## 2025-04-04 RX ORDER — DEXAMETHASONE SODIUM PHOSPHATE 10 MG/ML
INJECTION, SOLUTION INTRA-ARTICULAR; INTRALESIONAL; INTRAMUSCULAR; INTRAVENOUS; SOFT TISSUE
Status: DISCONTINUED | OUTPATIENT
Start: 2025-04-04 | End: 2025-04-04 | Stop reason: HOSPADM

## 2025-04-04 RX ORDER — LIDOCAINE HYDROCHLORIDE 20 MG/ML
INJECTION, SOLUTION INFILTRATION; PERINEURAL
Status: DISCONTINUED | OUTPATIENT
Start: 2025-04-04 | End: 2025-04-04 | Stop reason: HOSPADM

## 2025-04-04 RX ORDER — LIDOCAINE HYDROCHLORIDE 10 MG/ML
INJECTION, SOLUTION EPIDURAL; INFILTRATION; INTRACAUDAL; PERINEURAL
Status: DISCONTINUED | OUTPATIENT
Start: 2025-04-04 | End: 2025-04-04 | Stop reason: HOSPADM

## 2025-04-04 RX ORDER — SODIUM CHLORIDE 9 MG/ML
500 INJECTION, SOLUTION INTRAVENOUS CONTINUOUS
Status: DISCONTINUED | OUTPATIENT
Start: 2025-04-04 | End: 2025-04-04 | Stop reason: HOSPADM

## 2025-04-04 NOTE — DISCHARGE SUMMARY
Discharge Note  Short Stay    Admit Date: 4/4/2025    Attending Physician: Josef Mccauley    Discharge Physician: Josef Mccauley    Discharge Date: 4/4/2025 10:45 AM    Procedure(s) (LRB):  TRANSFORAMINAL EPIDURAL STEROID INJECTION (L2&L3) (Right)    Final Diagnosis: Lumbosacral radiculopathy [M54.17]    Disposition: Home or self care    Patient Instructions:   Current Discharge Medication List        CONTINUE these medications which have NOT CHANGED    Details   olmesartan (BENICAR) 40 MG tablet Take 1 tablet (40 mg total) by mouth once daily.  Qty: 30 tablet, Refills: 5    Comments: .  Associated Diagnoses: Essential hypertension      atorvastatin (LIPITOR) 20 MG tablet Take 1 tablet (20 mg total) by mouth every evening.  Qty: 90 tablet, Refills: 2    Associated Diagnoses: Dyslipidemia      niacin 500 MG CpSR 500 mg as needed.  Refills: 5      pantoprazole (PROTONIX) 40 MG tablet Take 1 tablet (40 mg total) by mouth once daily.  Qty: 30 tablet, Refills: 5    Associated Diagnoses: Chronic cough      sildenafiL (VIAGRA) 25 MG tablet Take 1 tablet (25 mg total) by mouth daily as needed for Erectile Dysfunction.  Qty: 20 tablet, Refills: 5    Associated Diagnoses: Erectile dysfunction, unspecified erectile dysfunction type             Discharge Diagnosis: Lumbosacral radiculopathy [M54.17]  Condition on Discharge: Stable with no complications to procedure   Diet on Discharge: Same as before.  Activity: as per instruction sheet.  Discharge to: Home with a responsible adult.  Follow up: 2-4 weeks       Please call my office or pager at [phone] if experienced any weakness or loss of sensation, fever > 101.5, pain uncontrolled with oral medications, persistent nausea/vomiting/or diarrhea, redness or drainage from the incisions, or any other worrisome concerns. If physician on call was not reached or could not communicate with our office for any reason please go to the nearest emergency department.     Josef MESSER  Parisa  04/04/2025

## 2025-04-04 NOTE — H&P
"HPI  Patient presenting for Procedure(s) (LRB):  TRANSFORAMINAL EPIDURAL STEROID INJECTION (L2&L3) (Right)     Patient on Anti-coagulation No    Patient is not on antibiotic for the last 2 weeks    Patient has a Ride Home assistance.    No health changes since previous encounter    Past Medical History:   Diagnosis Date    Hypertension     Mixed hyperlipidemia     Osteoarthritis      Past Surgical History:   Procedure Laterality Date    CARPAL TUNNEL RELEASE Left 3/27/2023    Procedure: RELEASE, CARPAL TUNNEL;  Surgeon: Brooks Vivas MD;  Location: [facility] OR;  Service: Orthopedics;  Laterality: Left;    CARPAL TUNNEL RELEASE Right 4/10/2023    Procedure: RELEASE, CARPAL TUNNEL;  Surgeon: Brooks Vivas MD;  Location: [facility] OR;  Service: Orthopedics;  Laterality: Right;    COLONOSCOPY N/A 7/25/2023    Procedure: COLONOSCOPY;  Surgeon: Justice Mccarty MD;  Location: [facility] ENDO;  Service: Endoscopy;  Laterality: N/A;  Patient needing miralax prep and EKG.    EPIDURAL STEROID INJECTION INTO LUMBAR SPINE N/A 4/12/2024    Procedure: LUMBAR EPIDURAL STEROID INJECTION (L4-5 INTERLAMINAR);  Surgeon: Josef Mccauley MD;  Location: [facility] OR;  Service: Pain Management;  Laterality: N/A;    KNEE ARTHROSCOPY      x 3    ROTATOR CUFF REPAIR Right     TONSILLECTOMY      TRANSFORAMINAL EPIDURAL INJECTION OF STEROID Right 8/23/2024    Procedure: TRANSFORAMINAL EPIDURAL STEROID INJECTION (L4/5,L5/S1);  Surgeon: Josef Mccauley MD;  Location: [facility] OR;  Service: Pain Management;  Laterality: Right;     Review of patient's allergies indicates:  No Known Allergies   No current facility-administered medications for this encounter.       PMHx, PSHx, Allergies, Medications reviewed in epic    ROS negative except pain complaints in HPI    OBJECTIVE:    BP (!) 171/96 (BP Location: Right arm, Patient Position: Lying)   Pulse 63   Temp 96.2 °F (35.7 °C) (Oral)   Resp 18   Ht 5' 5" (1.651 m)   Wt 93.4 kg (206 lb)   SpO2 98%   BMI " 34.28 kg/m²     PHYSICAL EXAMINATION:    GENERAL: Well appearing, in no acute distress, alert and oriented x3.  PSYCH:  Mood and affect appropriate.  SKIN: Skin color, texture, turgor normal, no rashes or lesions which will impact the procedure.  CV: RRR with palpation of the radial artery.  PULM: No evidence of respiratory difficulty, symmetric chest rise. Clear to auscultation.  NEURO: Cranial nerves grossly intact.    Plan:    Proceed with procedure as planned Procedure(s) (LRB):  TRANSFORAMINAL EPIDURAL STEROID INJECTION (L2&L3) (Right)    Josef Mccauley  Interventional Pain  04/04/2025

## 2025-04-04 NOTE — OP NOTE
Lumbar Transforaminal Epidural Steroid Injection under Fluoroscopic Guidance    The procedure, risks, benefits, and options were discussed with the patient. There are no contraindications to the procedure. The patent expressed understanding and agreed to the procedure. Informed written consent was obtained prior to the start of the procedure and can be found in the patient's chart.    PATIENT NAME: Sal Rowley   MRN: [id number]     DATE OF PROCEDURE: 04/04/2025    PROCEDURE:  Right  L2/3 and L3/4 Lumbar Transforaminal Epidural Steroid Injection under Fluoroscopic Guidance    PRE-OP DIAGNOSIS: Lumbosacral radiculopathy [M54.17] Lumbar radiculopathy [M54.16]    POST-OP DIAGNOSIS: Same    PHYSICIAN: Josef Mccauley MD      MEDICATIONS INJECTED: Preservative-free Decadron 10mg with 5cc of Lidocaine 1% MPF     LOCAL ANESTHETIC INJECTED: Xylocaine 2%     SEDATION: Versed 2mg and Fentanyl 100mcg                                                                                                                                                                                     Conscious sedation ordered by M.D. Patient re-evaluation prior to administration of conscious sedation. No changes noted in patient's status from initial evaluation. The patient's vital signs were monitored by RN and patient remained hemodynamically stable throughout the procedure.  No case tracking events are documented in the log.    ESTIMATED BLOOD LOSS: None    COMPLICATIONS: None    TECHNIQUE: Time-out was performed to identify the patient and procedure to be performed. With the patient laying in a prone position, the surgical area was prepped and draped in the usual sterile fashion using ChloraPrep and a fenestrated drape.The levels were determined under fluoroscopy guidance. Skin anesthesia was achieved by injecting Lidocaine 2% over the injection sites. The transforaminal spaces were then approached with a 22 gauge, 5 inch spinal quinke needle that  was introduced under fluoroscopic guidance in the AP and Lateral views. Once the needle tip was in the area of the transforaminal space, and there was no blood, CSF or paraesthesias, contrast dye Omnipaque (300mg/mL) was injected to confirm placement and there was no vascular runoff. Fluoroscopic imaging in the AP and lateral views revealed a clear outline of the spinal nerve with proximal spread of agent through the neural foramen into the epidural space. 2 mL of the medication mixture listed above was injected slowly at each site. Displacement of the radio opaque contrast after injection of the medication confirmed that the medication went into the area of the transforaminal spaces. The needles were removed and bleeding was nil. A sterile dressing was applied. No specimens collected. The patient tolerated the procedure well.       The patient was monitored after the procedure in the recovery area. They were given post-procedure and discharge instructions to follow at home. The patient was discharged in a stable condition.    Josef Mccauley MD

## 2025-04-04 NOTE — DISCHARGE INSTRUCTIONS
DIET: You may resume your normal diet today.    BATHING: You may resume your normal bathing.          You may shower, no hot water directly on site for 24 hours.    DRESSING: You may remove your bandage today.    ACTIVITY LEVEL: You may resume your normal activities 24 hours after your  procedure.    If you have received sedation or an anesthetic, you may feel sleepy  for several hours. Rest until you are more awake. Gradually resume your normal activities tomorrow.    If you have received sedation or an anesthetic, do not drive or operate heavy machinery for at least 24 hours.    MEDICATION: You may resume your normal medications today.    You will receive instructions for any pain prescriptions. Pain medications should be taken only as directed.    SPECIAL INSTRUCTIONS: No heat to the injection site for 24 hours including: bath or shower, heating pad, moist heat, hot tubs.    Use ice pack to injection site for any pain or discomfort. Apply ice pack to 20 minutes then remove for 20 minutes before re-applying to site.    WHEN TO CALL DOCTOR: Redness or swelling around injection site    Fever of 101F    Drainage (pus) from the injection site    For any continuous bleeding (some dried blood over the incision is normal).    FOLLOW UP: Follow up phone call will be made by office.        Diet on Discharge: Same as before.  Activity: as per instruction sheet.  Discharge to: Home with a responsible adult.  Follow up: 2-4 weeks        Please call my office or pager at [phone] if experienced any weakness or loss of sensation, fever > 101.5, pain uncontrolled with oral medications, persistent nausea/vomiting/or diarrhea, redness or drainage from the incisions, or any other worrisome concerns. If physician on call was not reached or could not communicate with our office for any reason please go to the nearest emergency department.      Josef Mccauley

## 2025-04-05 DIAGNOSIS — I10 ESSENTIAL HYPERTENSION: ICD-10-CM

## 2025-04-05 NOTE — TELEPHONE ENCOUNTER
No care due was identified.  [facility] Embedded Care Due Messages. Reference number: [id number].   4/05/2025 7:15:31 AM CDT

## 2025-04-06 RX ORDER — OLMESARTAN MEDOXOMIL 40 MG/1
40 TABLET ORAL
Qty: 90 TABLET | Refills: 3 | Status: SHIPPED | OUTPATIENT
Start: 2025-04-06

## 2025-04-06 NOTE — TELEPHONE ENCOUNTER
Refill Routing Note   Medication(s) are not appropriate for processing by [facility] for the following reason(s):        Required vitals abnormal    [facility] action(s):  Defer             Appointments  past 12m or future 3m with PCP    Date Provider   Last Visit   3/31/2025 Justice Mccarty MD   Next Visit   Visit date not found Justice Mccarty MD   ED visits in past 90 days: 0        Note composed:11:33 AM 04/06/2025

## 2025-04-07 ENCOUNTER — HOSPITAL ENCOUNTER (OUTPATIENT)
Dept: RADIOLOGY | Facility: HOSPITAL | Age: 70
Discharge: HOME | End: 2025-04-07
Attending: FAMILY MEDICINE
Payer: COMMERCIAL

## 2025-04-07 DIAGNOSIS — R05.3 CHRONIC COUGH: ICD-10-CM

## 2025-04-07 PROCEDURE — 71250 CT THORAX DX C-: CPT | Mod: TC

## 2025-04-07 PROCEDURE — 71250 CT THORAX DX C-: CPT | Mod: 26,,, | Performed by: RADIOLOGY

## 2025-04-08 DIAGNOSIS — M25.562 ACUTE PAIN OF LEFT KNEE: ICD-10-CM

## 2025-04-08 DIAGNOSIS — M17.12 PRIMARY OSTEOARTHRITIS OF LEFT KNEE: ICD-10-CM

## 2025-04-08 RX ORDER — CELECOXIB 200 MG/1
200 CAPSULE ORAL DAILY PRN
Qty: 30 CAPSULE | Refills: 0 | Status: SHIPPED | OUTPATIENT
Start: 2025-04-08

## 2025-04-08 NOTE — TELEPHONE ENCOUNTER
LOV 03/31/2025    patient requesting refill for   celecoxib (CELEBREX) 200 MG capsul       RX pending   pharmacy confirmed  please advise

## 2025-04-09 ENCOUNTER — RESULTS FOLLOW-UP (OUTPATIENT)
Dept: FAMILY MEDICINE | Facility: CLINIC | Age: 70
End: 2025-04-09

## 2025-04-09 DIAGNOSIS — R05.3 CHRONIC COUGH: Primary | ICD-10-CM

## 2025-04-10 NOTE — PROGRESS NOTES
He has some early fibrosis or scarring in the periphery of his longs. Also he has some small airway disease, possible? Will refer to Dr. Wiley for expert opinion and further eval of his chronic cough .Thanks

## 2025-04-25 ENCOUNTER — OFFICE VISIT (OUTPATIENT)
Facility: CLINIC | Age: 70
End: 2025-04-25
Payer: COMMERCIAL

## 2025-04-25 VITALS
DIASTOLIC BLOOD PRESSURE: 80 MMHG | BODY MASS INDEX: 36.15 KG/M2 | HEIGHT: 65 IN | RESPIRATION RATE: 18 BRPM | HEART RATE: 67 BPM | SYSTOLIC BLOOD PRESSURE: 134 MMHG | WEIGHT: 217 LBS | OXYGEN SATURATION: 97 %

## 2025-04-25 DIAGNOSIS — R05.3 CHRONIC COUGH: Primary | ICD-10-CM

## 2025-04-25 DIAGNOSIS — R93.89 ABNORMAL CT SCAN, CHEST: ICD-10-CM

## 2025-04-25 PROCEDURE — 99999 PR PBB SHADOW E&M-EST. PATIENT-LVL III: CPT | Mod: PBBFAC,,, | Performed by: INTERNAL MEDICINE

## 2025-04-25 NOTE — PROGRESS NOTES
[facility]    Today's Date: 04/25/2025    subjective    History of Present Illness    Patient presents today for chronic cough. He reports a chronic cough that started 4-5 years ago without any apparent reason. The cough occurs regularly, predominantly in the morning, and is characterized by throat clearing rather than a deep cough. He experiences white phlegm production, described as pea or pellet-sized, with a sensation of thick saliva in the throat. He currently takes Pantoprazole daily for reflux, which has improved his acid reflux symptoms but has not affected his cough. He was previously prescribed Trelegy for COPD at Dr. Vega's clinic. He takes Claritin as needed for sinus problems. 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      ROS:  ROS is negative unless otherwise indicated in HPI.          Results for orders placed during the hospital encounter of 04/07/25    CT Chest Without Contrast    Narrative  EXAMINATION:  CT CHEST WITHOUT CONTRAST    CLINICAL HISTORY:  Cough, persistent; Chronic cough    TECHNIQUE:  Low dose axial images, sagittal and coronal reformations were obtained from the thoracic inlet to the lung bases. Contrast was not administered.    COMPARISON:  06/05/2023    FINDINGS:  The thyroid appears normal.  The main central airways are patent.  The esophagus appears normal.  The heart is normal in size.  Calcified coronary artery disease.  Calcified atheromatous disease of the aorta.  No pericardial effusion.  No mediastinal, hilar or axillary adenopathy is seen.    There is a mosaic perfusion pattern of the lungs which can be seen the setting of small airways versus small vessels disease.  There is  primarily peripheral subpleural reticulation throughout both lungs suggesting component of fibrosis.  No honeycombing is seen.  No large airspace consolidations.  No pleural effusions.  Full evaluation of the lungs is limited secondary to respiratory motion artifact.    Limited evaluation of the upper abdominal structures show fatty infiltration of the liver.    Age-appropriate degenerative changes affect the skeleton.    Impression  Evaluation of the lungs is somewhat limited secondary to respiratory motion artifact.  There is a mosaic perfusion pattern of the lungs which can be seen the setting of small airways versus small vessels disease.  Additionally, there is some subpleural reticulation bilaterally suggesting component of fibrosis.  No honeycombing is seen.  No consolidation or pleural effusions.    Fatty infiltration of the liver.      Electronically signed by: Mroena Rowe MD  Date:    04/07/2025  Time:    09:53       objective     Vitals:    04/25/25 1302   BP: 134/80   Pulse: 67   Resp: 18     Physical Exam    Respiratory: No wheezing. Normal breath sounds. No crackles.       assessment & plan     Assessment & Plan    UPPER AIRWAY COUGH SYNDROME / POSTNASAL DRIP:   Considered upper airway cough syndrome as primary cause of chronic cough, given description of mucus dripping onto vocal cords.   Recommend trial of nasal steroid and antihistamine regimen to address potential upper airway cough syndrome.   Discussed upper airway cough syndrome and how postnasal drip can irritate vocal cords, leading to cough.   Started Fluticasone (Flonase) nasal spray: 2 sprays twice daily (morning and evening).   Started Zyrtec or Claritin: 1 tablet in the morning.   Started Benadryl (diphenhydramine): 1 tablet at night.    GASTROESOPHAGEAL REFLUX DISEASE:   Continued proton pump inhibitor therapy for acid reflux, increasing Pantoprazole (Protonix) from daily to twice daily (morning and night) to address potential silent  reflux contributing to cough.   Provided information on silent reflux and its potential role in nighttime coughing.    INTERSTITIAL PULMONARY DISEASE / PULMONARY FIBROSIS:   Deferred further workup for potential fibrosis until after new imaging and treatment trial results.   Explained concept of pulmonary fibrosis and need for further evaluation before determining specific type or progression.   Ordered repeat CT Chest to better evaluate possible pulmonary fibrosis mentioned in previous imaging.   Ordered repeat PFTs to assess for any changes in lung function since 2023.    CHRONIC COUGH:   Explained potential causes of chronic cough, ranging from benign to more serious conditions.    FOLLOW-UP:   Follow up after completing 6 weeks of new medication regimen.   Follow up after CT and PFTs are completed.   Contact the office to schedule CT and PFTs.         Summary/Orders  1. Chronic cough  - Ambulatory referral/consult to Pulmonology  - CT Chest High Resolution Without Contrast; Future  - Stress test, pulmonary; Future  - Complete PFT with bronchodilator; Future    2. Abnormal CT scan, chest  - CT Chest High Resolution Without Contrast; Future  - Stress test, pulmonary; Future  - Complete PFT with bronchodilator; Future         Jin Wiley MD  Pulmonary & Critical Care Medicine  [facility] -    [phone]    This note was generated with the assistance of ambient listening technology. Verbal consent was obtained by the patient and accompanying visitor(s) for the recording of patient appointment to facilitate this note. I attest to having reviewed and edited the generated note for accuracy, though some syntax or spelling errors may persist. Please contact the author of this note for any clarification.

## 2025-05-03 DIAGNOSIS — M17.12 PRIMARY OSTEOARTHRITIS OF LEFT KNEE: ICD-10-CM

## 2025-05-03 DIAGNOSIS — M25.562 ACUTE PAIN OF LEFT KNEE: ICD-10-CM

## 2025-05-04 NOTE — TELEPHONE ENCOUNTER
No care due was identified.  [facility] Embedded Care Due Messages. Reference number: [id number].   5/03/2025 7:31:09 PM CDT

## 2025-05-05 ENCOUNTER — TELEPHONE (OUTPATIENT)
Dept: PAIN MEDICINE | Facility: CLINIC | Age: 70
End: 2025-05-05

## 2025-05-05 ENCOUNTER — OFFICE VISIT (OUTPATIENT)
Dept: PAIN MEDICINE | Facility: CLINIC | Age: 70
End: 2025-05-05
Payer: COMMERCIAL

## 2025-05-05 VITALS
WEIGHT: 217 LBS | DIASTOLIC BLOOD PRESSURE: 62 MMHG | OXYGEN SATURATION: 95 % | SYSTOLIC BLOOD PRESSURE: 119 MMHG | BODY MASS INDEX: 36.15 KG/M2 | HEART RATE: 72 BPM | HEIGHT: 65 IN

## 2025-05-05 DIAGNOSIS — M47.816 LUMBAR FACET ARTHROPATHY: ICD-10-CM

## 2025-05-05 DIAGNOSIS — G57.11 MERALGIA PARAESTHETICA, RIGHT: Primary | ICD-10-CM

## 2025-05-05 DIAGNOSIS — M54.16 LUMBAR RADICULITIS: ICD-10-CM

## 2025-05-05 DIAGNOSIS — M51.360 DEGENERATION OF INTERVERTEBRAL DISC OF LUMBAR REGION WITH DISCOGENIC BACK PAIN: ICD-10-CM

## 2025-05-05 DIAGNOSIS — G57.11 MERALGIA PARAESTHETICA, RIGHT: ICD-10-CM

## 2025-05-05 DIAGNOSIS — M54.17 LUMBOSACRAL RADICULOPATHY: Primary | ICD-10-CM

## 2025-05-05 PROCEDURE — 1126F AMNT PAIN NOTED NONE PRSNT: CPT | Mod: CPTII,S$GLB,, | Performed by: ANESTHESIOLOGY

## 2025-05-05 PROCEDURE — 4010F ACE/ARB THERAPY RXD/TAKEN: CPT | Mod: CPTII,S$GLB,, | Performed by: ANESTHESIOLOGY

## 2025-05-05 PROCEDURE — 3078F DIAST BP <80 MM HG: CPT | Mod: CPTII,S$GLB,, | Performed by: ANESTHESIOLOGY

## 2025-05-05 PROCEDURE — 1160F RVW MEDS BY RX/DR IN RCRD: CPT | Mod: CPTII,S$GLB,, | Performed by: ANESTHESIOLOGY

## 2025-05-05 PROCEDURE — 99214 OFFICE O/P EST MOD 30 MIN: CPT | Mod: S$GLB,,, | Performed by: ANESTHESIOLOGY

## 2025-05-05 PROCEDURE — 1159F MED LIST DOCD IN RCRD: CPT | Mod: CPTII,S$GLB,, | Performed by: ANESTHESIOLOGY

## 2025-05-05 PROCEDURE — 99999 PR PBB SHADOW E&M-EST. PATIENT-LVL III: CPT | Mod: PBBFAC,,, | Performed by: ANESTHESIOLOGY

## 2025-05-05 PROCEDURE — 3008F BODY MASS INDEX DOCD: CPT | Mod: CPTII,S$GLB,, | Performed by: ANESTHESIOLOGY

## 2025-05-05 PROCEDURE — 3074F SYST BP LT 130 MM HG: CPT | Mod: CPTII,S$GLB,, | Performed by: ANESTHESIOLOGY

## 2025-05-05 RX ORDER — CELECOXIB 200 MG/1
200 CAPSULE ORAL DAILY PRN
Qty: 30 CAPSULE | Refills: 0 | Status: SHIPPED | OUTPATIENT
Start: 2025-05-05

## 2025-05-05 NOTE — PROGRESS NOTES
[facility] EST Patient Evaluation    Sal Rowley  : 1955  Date: 2025     CHIEF COMPLAINT:  No chief complaint on file.  Referring Physician: No ref. provider found  Primary Care Physician: Justice Mccarty MD    HPI:  This is a 69 y.o. male with a chief complaint of No chief complaint on file.  . The patient has Past medical history/Past surgical history of left rotator cuff muscle tear, hypertension, hyperlipidemia, acid reflux    Patient was evaluated and referred by Neurology provider for lumbar radiculopathy.    He had EMG/nerve conduction study completed in 2024 that showed chronic and mild lumbosacral radiculopathy from L3-S1 bilaterally.    He started physical therapy in 2024 with mild relief  MRI lumbar spine updated in 2023 showed grade 1 retrolisthesis and anterolisthesis across the lumbar spine.  Has multilevel facet arthropathy with mild-to-moderate neural foramina narrowing at multiple levels worse at L4-L5  Does not take any pain medication  Never tried gabapentin or Lyrica before.    Interval History 2024:  Sal Rowley is here for follow up visit after lumbar epidural steroid injection at L4-5 in , he reported 50% improvement in pain and functionality after injection for back pain symptoms however he still has numbness in the right leg  Was seen by Dr. Ward 2024,  for numbness and burning in right LE.  Current pain score: 0/10 Spine   Interval History 2024:  Sal Rowley is here for procedure follow up visit after right L4 and L5 transforaminal epidural steroid injection in ,  patient reported 80% for his low back pain, howerver it did not help Rt thigh numbness.  Current pain score: 0/10    Interval History 2025:  Sal Rowley is here to discuss appropriate procedure for his right thigh numbness.  Current pain score: 0/10    Interval History 2025:  Sal Justin  Amrik is here for procedure follow up visit after right L2 and L3 transforaminal epidural steroid injection,  patient reported *** % improvement in pain and functionality.  Current pain score: ***/10      Diabetic: No    Anticogualtion drugs: None    Allergy To Iodine: No    Currently on Antibiotic: No    Current Description of Pain Symptoms:  History of Recent Fall or Trauma: No   Onset: Chronic, started 11/2021 after accident  Pain Location: Very mild lower back  Radiates/associated symptoms:  Anterior aspect of right thigh.   Pain is Getting worse over the last 3 months    The pain is described as aching, numbing, and stabbing.   Exacerbating factors: Standing  and sitting for a long period of time  Mitigating factors: changing position and laying down   Symptoms interfere with daily activity, sleeping, and work.   The patient feels like symptoms have been unchanged  Patient denies night fever/night sweats, urinary incontinence, bowel incontinence, significant weight loss, significant motor weakness, and loss of sensations.    Pain score:   Best: 1/10  Worst: 10/10    Current pain medications:  - lidocaine patches    Current Narcotics/Opioid /benzo Medications:  Opioids- None  Benzodiazepines: No    UDS:  NA    PDMP:  Reviewed and consistent with medication use as prescribed.   Previous Chronic Pain Treatment History:  Physical Therapy/HEP/Physician Lead Exercise Program:  Over the past 12 months, Patient has done  6+ sessions.  PT response: Not Helpful.   Dates of the PT sessions: 2/2024  Is patient participating in home exercise program (HEP): No.    Non-interventional Pain Therapy:  []Chiropractor.   []Acupuncture/Dry needle.  []TENS unit.  []Heat/ICE.  []Back Brace.    Medications previously tried:  NSAIDs: Ibuprofen (Advil/Motrin) and Celebrex (Celecoxib)  Topical Agent: No  TCA/SSRI/SNRI: None  Anti-convulsants: None  Muscle Relaxants: None  Opioids- Tramadol (Ultram).    Interventional Pain  "Procedures:  04/2024:  lumbar epidural steroid injection at L4-5, 50% improvement in pain and functionality  08/2024: right L4 and L5 transforaminal epidural steroid injection -  more relief for his low back pain, no relief for right thigh numbness  ***  Previous spine/Relevant joint surgery:  none  Surgical History:   has a past surgical history that includes Knee arthroscopy; Tonsillectomy; Rotator cuff repair (Right); Carpal tunnel release (Left, 3/27/2023); Carpal tunnel release (Right, 4/10/2023); Colonoscopy (N/A, 7/25/2023); Epidural steroid injection into lumbar spine (N/A, 4/12/2024); Transforaminal epidural injection of steroid (Right, 8/23/2024); and Transforaminal epidural injection of steroid (Right, 4/4/2025).  Medical History:   has a past medical history of Hypertension, Mixed hyperlipidemia, and Osteoarthritis.  Family History:  family history is not on file.  Allergies:  Patient has no known allergies.   Social History/SUBSTANCE ABUSE HISTORY:  Personal history of substance abuse: No   reports that he quit smoking about 35 years ago. His smoking use included cigarettes. He has been exposed to tobacco smoke. He does not have any smokeless tobacco history on file. He reports current alcohol use. He reports that he does not use drugs.  LABS:  CBC  Lab Results   Component Value Date    WBC 7.88 09/30/2024    HGB 15.5 09/30/2024    HCT 44.5 09/30/2024     Coagulation Profile   Lab Results   Component Value Date     09/30/2024     No results found for: "PT", "PTT", "INR"  CMP:  BMP  Lab Results   Component Value Date     03/31/2025    K 4.9 03/31/2025     03/31/2025    CO2 23 03/31/2025    BUN 18 03/31/2025    CREATININE 1.1 03/31/2025    CALCIUM 9.3 03/31/2025    ANIONGAP 12 03/31/2025    EGFRNORACEVR >60 03/31/2025     Lab Results   Component Value Date    ALT 51 (H) 03/31/2025    AST 53 (H) 03/31/2025    ALKPHOS 52 03/31/2025    BILITOT 0.5 03/31/2025     HGBA1C:  Lab Results "   Component Value Date    HGBA1C 5.8 (H) 09/29/2023   ROS:    Review of Systems   GENERAL:  No weight loss, malaise or fevers.  HEENT:   No recent changes in vision or hearing  NECK:  Negative for lumps, no difficulty with swallowing.  RESPIRATORY:  Negative for cough, wheezing or shortness of breath, patient denies any recent URI.  CARDIOVASCULAR:  Negative for chest pain, leg swelling or palpitations.  GI:  Negative for abdominal discomfort, blood in stools or black stools or change in bowel habits.  MUSCULOSKELETAL:  See HPI.  SKIN:  Negative for lesions, rash, and itching.  PSYCH:  No mood disorder or recent psychosocial stressors.   HEMATOLOGY/LYMPHOLOGY:  See the blood thinner sectioned in HPI.  NEURO:  See HPI  All other reviewed and negative other than HPI.    PHYSICAL EXAM:  VITALS: There were no vitals taken for this visit.  There is no height or weight on file to calculate BMI.  GENERAL: Well appearing, in no acute distress, alert and oriented x3, answers questions appropriately.   PSYCH: Flat affect.  SKIN: Skin color, texture, turgor normal, no rashes or lesions.  HEAD/FACE:  Normocephalic, atraumatic. Cranial nerves grossly intact.  CV: Regular rate  PULM: No evidence of respiratory difficulty, symmetric chest rise.  GI:  Soft and non-Distended.  BACK/SIJ/HIP:  Lumbar Spine Exam:       Inspection: No erythema, bruising.       Palpation: (+) TTP of lumbar paraspinals bilaterally      ROM:  Limited in flexion, extension, lateral bending.       (mild +) Facet loading bilaterally      (+) Straight Leg Raise bilaterally      (-) DHAVAL bilaterally, Tenderness over the PSIS, Yeoman test  Hip Exam:      Inspection: No gross deformity or apparent leg length discrepancy      Palpation:  No TTP to bilateral greater trochanteric bursas,  decreased sensation of the lateral aspect right thigh      ROM:  No limitation or pain in internal rotation, external rotation b/l  Neurologic Exam:     Alert. Speech is fluent  and appropriate.     Strength: 5/5 in BL hip flexion and knee extension,      Sensation:  Grossly intact to light touch in bilateral lower extremities     Tone: No abnormality appreciated in bilateral lower extremities  GAIT:  Normal gait    DIAGNOSTIC STUDIES AND MEDICAL RECORDS REVIEW:    EMG/NCS 2024  Chronic (more than 6 weeks old) and mild lumbosacral radiculopathy from L3-S1 bilaterally    MRI lumbar spine 12/2023  Marrow signal is within normal limits.  The conus lies at the L1 vertebral body level.  There is no evidence clumping of the nerve roots.     L1/2: There is grade 1 retrolisthesis of L1 with respect L2.  There is a circumferential disc bulge resulting in mild narrowing of bilateral neural foramina and mild narrowing of the central canal.     L2/3: There is grade 1 retrolisthesis of L2 with respect L3 and a circumferential disc bulge resulting in mild narrowing of bilateral neural foramina and mild narrowing of the central canal.     L3/4: There is a circumferential disc bulge resulting in moderate narrowing of bilateral neural foramina and moderate narrowing of the central canal.     L4/L5: There is grade 1 anterolisthesis of L4 with respect L5 and a circumferential disc bulge resulting in moderate narrowing of bilateral neural foramina and moderate central canal narrowing.  There is bilateral facet arthropathy.     L5/S1: There is grade 1 anterolisthesis of L5 with respect S1 and a circumferential disc protrusion with moderate narrowing of bilateral neural foramina.     X-ray lumbar spine flexion-extension 03/2024 showed There is retrolisthesis of T12 on L1, L1 on L2, L2 on L3 and L3 on L4 with anterolisthesis of L4 on L5 and L5 on S1. The anterolisthesis at L4-5 and L5-S1 appears to worsen on flexion imaging and improves on extension imaging concerning for ligamentous instability. There is also slight worsening of retrolisthesis in the upper lumbar spine on extension imaging concerning for  ligamentous instability.      ASSESSMENT:  Sal Rowley is a 69 y.o. male with the following diagnoses based on history, exam, and imaging:  There are no diagnoses linked to this encounter.    This is a pleasant 69 y.o. male patient with PMH  left rotator cuff muscle tear, hypertension, hyperlipidemia, acid reflux, presenting with low back pain stabbing and aching in nature in addition to anterior- lateral aspect right thigh numbness and tingling that has been worsening since the accident 2021, patient had is completed multiple sessions of physical therapy in 2023 without significant improvement in pain and functionality, had MRI lumbar spine that show multilevel anteriorlisthesis and retrolisthesis, facet arthropathy, moderate bilateral neural foramina narrowing and moderate central canal stenosis most specifically at L4-L5, he had lumbar epidural steroid injection at L4-L5 with a  moderate improvement 50% in his low back pain however his numbness in his right lower extremity is still bothering him,  followed by right L4 and L5 transforaminal epidural steroid injection  which helped his pain significantly up to 80% improvement in pain and functionality,  however no relief for his right thigh numbness, I believe most of his pain comes from lateral femoral cutaneous nerve versus  right L2-L3  radiculopathy    Treatment Plan:    Diagnostics/Referrals:  HEP    Medications:    NSAIDs: None  Topical Agent: Lidocaine patch + Compound cream  TCA/SSRI/SNRI: None  Anti-convulsants: None  Muscle Relaxants: None  Opioids: None    Interventional Therapy: Please schedule for Right  femoral  cutaneous nerve block  Sedation:  oral sedation  Clearance to stop Blood thinner:  None    Regarding the above interventions, the patient has been educated regarding the risks (including bleeding, infection, increased pain, nerve damage, or allergic reaction), benefits, and alternatives. The patient states he understands and is eager  to proceed.    Physical Rehabilitation:  HEP    Patient Education: Counseled patient regarding the importance of activity modification, I have stressed the importance of physical activity and a home exercise plan to help with pain and improve health.    Follow-up: RTC as discussed     May consider:   may consider left femoral cutaneous nerve block    Josef Mccauley MD  Anesthesiologist  Interventional Pain Medicine  05/05/2025    Disclaimer:  This note was prepared using voice recognition system and is likely to have sound alike errors that may have been overlooked even after proof reading.  Please call me with any questions.

## 2025-05-05 NOTE — TELEPHONE ENCOUNTER
----- Message from Josef Mccauley MD sent at 5/5/2025 10:35 AM CDT -----  · Interventional Therapy: Please schedule for Right  femoral  cutaneous nerve block, oral sedation, July 11· Sedation:  oral sedation· Clearance to stop Blood thinner:  None

## 2025-05-05 NOTE — PROGRESS NOTES
[facility] EST Patient Evaluation    Sal Rowley  : 1955  Date: 2025     CHIEF COMPLAINT:  Follow-up and Low-back Pain  Referring Physician: Self, Aaareferral  Primary Care Physician: Justice Mccarty MD    HPI:  This is a 69 y.o. male with a chief complaint of Follow-up and Low-back Pain  . The patient has Past medical history/Past surgical history of left rotator cuff muscle tear, hypertension, hyperlipidemia, acid reflux    Patient was evaluated and referred by Neurology provider for lumbar radiculopathy.    He had EMG/nerve conduction study completed in 2024 that showed chronic and mild lumbosacral radiculopathy from L3-S1 bilaterally.    He started physical therapy in 2024 with mild relief  MRI lumbar spine updated in 2023 showed grade 1 retrolisthesis and anterolisthesis across the lumbar spine.  Has multilevel facet arthropathy with mild-to-moderate neural foramina narrowing at multiple levels worse at L4-L5  Does not take any pain medication  Never tried gabapentin or Lyrica before.    Interval History 2024:  Sal Rowley is here for follow up visit after lumbar epidural steroid injection at L4-5 in , he reported 50% improvement in pain and functionality after injection for back pain symptoms however he still has numbness in the right leg  Was seen by Dr. Ward 2024,  for numbness and burning in right LE.  Current pain score: 0/10 Spine   Interval History 2024:  Sal Rowley is here for procedure follow up visit after right L4 and L5 transforaminal epidural steroid injection in ,  patient reported 80% for his low back pain, howerver it did not help Rt thigh numbness.  Current pain score: 0/10    Interval History 2025:  Sal Rowley is here to discuss appropriate procedure for his right thigh numbness.  Current pain score: 0/10    Interval History 2025:  Sal Rowley  is here for procedure follow up visit after right L2 and L3 transforaminal epidural steroid injection,  patient reported 0 % improvement in pain and functionality.  Current pain score: 0/10    Diabetic: No    Anticoagulation drugs: None    Allergy To Iodine: No    Currently on Antibiotic: No    Current Description of Pain Symptoms:  History of Recent Fall or Trauma: No   Onset: Chronic, started 11/2021 after accident  Pain Location: Very mild lower back  Radiates/associated symptoms:  Anterior aspect of right thigh.   Pain is Getting worse over the last 3 months    The pain is described as aching, numbing, and stabbing.   Exacerbating factors: Standing  and sitting for a long period of time  Mitigating factors: changing position and laying down   Symptoms interfere with daily activity, sleeping, and work.   The patient feels like symptoms have been unchanged  Patient denies night fever/night sweats, urinary incontinence, bowel incontinence, significant weight loss, significant motor weakness, and loss of sensations.    Pain score:   Best: 1/10  Worst: 10/10    Current pain medications:  - lidocaine patches  - Celebrex p.r.n.    Current Narcotics/Opioid /benzo Medications:  Opioids- None  Benzodiazepines: No    UDS:  NA    PDMP:  Reviewed and consistent with medication use as prescribed.   Previous Chronic Pain Treatment History:  Physical Therapy/HEP/Physician Lead Exercise Program:  Over the past 12 months, Patient has done  6+ sessions.  PT response: Not Helpful.   Dates of the PT sessions: 2/2024  Is patient participating in home exercise program (HEP): No.    Non-interventional Pain Therapy:  []Chiropractor.   []Acupuncture/Dry needle.  []TENS unit.  []Heat/ICE.  []Back Brace.    Medications previously tried:  NSAIDs: Ibuprofen (Advil/Motrin) and Celebrex (Celecoxib)  Topical Agent: No  TCA/SSRI/SNRI: None  Anti-convulsants: None  Muscle Relaxants: None  Opioids- Tramadol (Ultram).    Interventional Pain  "Procedures:  04/2024:  lumbar epidural steroid injection at L4-5, 50% improvement in pain and functionality  08/2024: right L4 and L5 transforaminal epidural steroid injection -  more relief for his low back pain, no relief for right thigh numbness  04/04/2025- Right  L2/3 and L3/4 Lumbar Transforaminal Epidural Steroid Injection- 0% relief  Previous spine/Relevant joint surgery:  none  Surgical History:   has a past surgical history that includes Knee arthroscopy; Tonsillectomy; Rotator cuff repair (Right); Carpal tunnel release (Left, 3/27/2023); Carpal tunnel release (Right, 4/10/2023); Colonoscopy (N/A, 7/25/2023); Epidural steroid injection into lumbar spine (N/A, 4/12/2024); Transforaminal epidural injection of steroid (Right, 8/23/2024); and Transforaminal epidural injection of steroid (Right, 4/4/2025).  Medical History:   has a past medical history of Hypertension, Mixed hyperlipidemia, and Osteoarthritis.  Family History:  family history is not on file.  Allergies:  Patient has no known allergies.   Social History/SUBSTANCE ABUSE HISTORY:  Personal history of substance abuse: No   reports that he quit smoking about 35 years ago. His smoking use included cigarettes. He has been exposed to tobacco smoke. He does not have any smokeless tobacco history on file. He reports current alcohol use. He reports that he does not use drugs.  LABS:  CBC  Lab Results   Component Value Date    WBC 7.88 09/30/2024    HGB 15.5 09/30/2024    HCT 44.5 09/30/2024     Coagulation Profile   Lab Results   Component Value Date     09/30/2024     No results found for: "PT", "PTT", "INR"  CMP:  BMP  Lab Results   Component Value Date     03/31/2025    K 4.9 03/31/2025     03/31/2025    CO2 23 03/31/2025    BUN 18 03/31/2025    CREATININE 1.1 03/31/2025    CALCIUM 9.3 03/31/2025    ANIONGAP 12 03/31/2025    EGFRNORACEVR >60 03/31/2025     Lab Results   Component Value Date    ALT 51 (H) 03/31/2025    AST 53 (H) " "03/31/2025    ALKPHOS 52 03/31/2025    BILITOT 0.5 03/31/2025     HGBA1C:  Lab Results   Component Value Date    HGBA1C 5.8 (H) 09/29/2023   ROS:    Review of Systems   GENERAL:  No weight loss, malaise or fevers.  HEENT:   No recent changes in vision or hearing  NECK:  Negative for lumps, no difficulty with swallowing.  RESPIRATORY:  Negative for cough, wheezing or shortness of breath, patient denies any recent URI.  CARDIOVASCULAR:  Negative for chest pain, leg swelling or palpitations.  GI:  Negative for abdominal discomfort, blood in stools or black stools or change in bowel habits.  MUSCULOSKELETAL:  See HPI.  SKIN:  Negative for lesions, rash, and itching.  PSYCH:  No mood disorder or recent psychosocial stressors.   HEMATOLOGY/LYMPHOLOGY:  See the blood thinner sectioned in HPI.  NEURO:  See HPI  All other reviewed and negative other than HPI.    PHYSICAL EXAM:  VITALS: /62 (BP Location: Right arm, Patient Position: Sitting)   Pulse 72   Ht 5' 5" (1.651 m)   Wt 98.4 kg (217 lb)   SpO2 95%   BMI 36.11 kg/m²   Body mass index is 36.11 kg/m².  GENERAL: Well appearing, in no acute distress, alert and oriented x3, answers questions appropriately.   PSYCH: Flat affect.  SKIN: Skin color, texture, turgor normal, no rashes or lesions.  HEAD/FACE:  Normocephalic, atraumatic. Cranial nerves grossly intact.  CV: Regular rate  PULM: No evidence of respiratory difficulty, symmetric chest rise.  GI:  Soft and non-Distended.  BACK/SIJ/HIP:  Lumbar Spine Exam:       Inspection: No erythema, bruising.       Palpation: (+) TTP of lumbar paraspinals bilaterally      ROM:  Limited in flexion, extension, lateral bending.       (mild +) Facet loading bilaterally      (+) Straight Leg Raise bilaterally      (-) DHAVAL bilaterally, Tenderness over the PSIS, Yeoman test  Hip Exam:      Inspection: No gross deformity or apparent leg length discrepancy      Palpation:  No TTP to bilateral greater trochanteric bursas,  " decreased sensation of the lateral aspect right thigh      ROM:  No limitation or pain in internal rotation, external rotation b/l  Neurologic Exam:     Alert. Speech is fluent and appropriate.     Strength: 5/5 in BL hip flexion and knee extension,      Sensation:  Grossly intact to light touch in bilateral lower extremities     Tone: No abnormality appreciated in bilateral lower extremities  GAIT:  Normal gait    DIAGNOSTIC STUDIES AND MEDICAL RECORDS REVIEW:    EMG/NCS 2024  Chronic (more than 6 weeks old) and mild lumbosacral radiculopathy from L3-S1 bilaterally    MRI lumbar spine 12/2023  Marrow signal is within normal limits.  The conus lies at the L1 vertebral body level.  There is no evidence clumping of the nerve roots.     L1/2: There is grade 1 retrolisthesis of L1 with respect L2.  There is a circumferential disc bulge resulting in mild narrowing of bilateral neural foramina and mild narrowing of the central canal.     L2/3: There is grade 1 retrolisthesis of L2 with respect L3 and a circumferential disc bulge resulting in mild narrowing of bilateral neural foramina and mild narrowing of the central canal.     L3/4: There is a circumferential disc bulge resulting in moderate narrowing of bilateral neural foramina and moderate narrowing of the central canal.     L4/L5: There is grade 1 anterolisthesis of L4 with respect L5 and a circumferential disc bulge resulting in moderate narrowing of bilateral neural foramina and moderate central canal narrowing.  There is bilateral facet arthropathy.     L5/S1: There is grade 1 anterolisthesis of L5 with respect S1 and a circumferential disc protrusion with moderate narrowing of bilateral neural foramina.     X-ray lumbar spine flexion-extension 03/2024 showed There is retrolisthesis of T12 on L1, L1 on L2, L2 on L3 and L3 on L4 with anterolisthesis of L4 on L5 and L5 on S1. The anterolisthesis at L4-5 and L5-S1 appears to worsen on flexion imaging and improves on  extension imaging concerning for ligamentous instability. There is also slight worsening of retrolisthesis in the upper lumbar spine on extension imaging concerning for ligamentous instability.      ASSESSMENT:  Sal Rowley is a 69 y.o. male with the following diagnoses based on history, exam, and imagin. Lumbosacral radiculopathy    2. Meralgia paraesthetica, right    3. Lumbar radiculitis    4. Lumbar facet arthropathy    5. Degeneration of intervertebral disc of lumbar region with discogenic back pain      This is a pleasant 69 y.o. male patient with PMH  left rotator cuff muscle tear, hypertension, hyperlipidemia, acid reflux, presenting with low back pain stabbing and aching in nature in addition to anterior- lateral aspect right thigh numbness and tingling that has been worsening since the accident , patient had is completed multiple sessions of physical therapy in  without significant improvement in pain and functionality, had MRI lumbar spine that show multilevel anteriorlisthesis and retrolisthesis, facet arthropathy, moderate bilateral neural foramina narrowing and moderate central canal stenosis most specifically at L4-L5, he had lumbar epidural steroid injection at L4-L5 with a  moderate improvement 50% in his low back pain however his numbness in his right lower extremity is still bothering him,  followed by right L4 and L5 transforaminal epidural steroid injection  which helped his pain significantly up to 80% improvement in pain and functionality,  however no relief for his right thigh numbness, right L2 and L3 transforaminal epidural steroid injection failed to improve his right thigh numbness.    Treatment Plan:    Diagnostics/Referrals:  HEP    Medications:    NSAIDs: Celebrex  Topical Agent: Lidocaine patch + Compound cream  TCA/SSRI/SNRI: None  Anti-convulsants: None  Muscle Relaxants: None  Opioids: None    Interventional Therapy: Please schedule for Right  femoral   cutaneous nerve block, oral sedation, July 11  Sedation:  oral sedation  Clearance to stop Blood thinner:  None    Regarding the above interventions, the patient has been educated regarding the risks (including bleeding, infection, increased pain, nerve damage, or allergic reaction), benefits, and alternatives. The patient states he understands and is eager to proceed.    Physical Rehabilitation:  HEP    Patient Education: Counseled patient regarding the importance of activity modification, I have stressed the importance of physical activity and a home exercise plan to help with pain and improve health.    Follow-up: RTC as discussed     May consider: PNS    Josef Mccauley MD  Anesthesiologist  Interventional Pain Medicine  05/05/2025    Disclaimer:  This note was prepared using voice recognition system and is likely to have sound alike errors that may have been overlooked even after proof reading.  Please call me with any questions.

## 2025-05-07 ENCOUNTER — HOSPITAL ENCOUNTER (OUTPATIENT)
Dept: PULMONOLOGY | Facility: HOSPITAL | Age: 70
Discharge: HOME | End: 2025-05-07
Attending: INTERNAL MEDICINE
Payer: COMMERCIAL

## 2025-05-07 ENCOUNTER — HOSPITAL ENCOUNTER (OUTPATIENT)
Dept: RADIOLOGY | Facility: HOSPITAL | Age: 70
Discharge: HOME | End: 2025-05-07
Attending: INTERNAL MEDICINE
Payer: COMMERCIAL

## 2025-05-07 VITALS — WEIGHT: 216.94 LBS | HEIGHT: 65 IN | BODY MASS INDEX: 36.14 KG/M2

## 2025-05-07 DIAGNOSIS — R93.89 ABNORMAL CT SCAN, CHEST: ICD-10-CM

## 2025-05-07 DIAGNOSIS — R05.3 CHRONIC COUGH: ICD-10-CM

## 2025-05-07 LAB
BRPFT: ABNORMAL
DLCO SINGLE BREATH LLN: 16.24
DLCO SINGLE BREATH PRE REF: 84.6 %
DLCO SINGLE BREATH REF: 23.17
DLCOC SBVA LLN: 2.45
DLCOC SBVA REF: 3.79
DLCOC SINGLE BREATH LLN: 16.24
DLCOC SINGLE BREATH REF: 23.17
DLCOVA LLN: 2.45
DLCOVA PRE REF: 133.1 %
DLCOVA PRE: 5.05 ML/(MIN*MMHG*L) (ref 2.45–5.13)
DLCOVA REF: 3.79
ERVN2 LLN: -16449.06
ERVN2 PRE REF: 12.2 %
ERVN2 PRE: 0.12 L (ref -16449.06–16450.94)
ERVN2 REF: 0.94
FEF 25 75 CHG: -10.9 %
FEF 25 75 LLN: 1.23
FEF 25 75 POST REF: 105 %
FEF 25 75 PRE REF: 117.8 %
FEF 25 75 REF: 2.94
FET100 CHG: 15.1 %
FEV1 CHG: -2.4 %
FEV1 FVC CHG: -1.1 %
FEV1 FVC LLN: 63
FEV1 FVC POST REF: 112.9 %
FEV1 FVC PRE REF: 114.2 %
FEV1 FVC REF: 77
FEV1 LLN: 1.97
FEV1 POST REF: 80.8 %
FEV1 PRE REF: 82.8 %
FEV1 REF: 2.73
FRCN2 LLN: 2.41
FRCN2 PRE REF: 37.3 %
FRCN2 REF: 3.39
FVC CHG: -1.3 %
FVC LLN: 2.64
FVC POST REF: 71.4 %
FVC PRE REF: 72.4 %
FVC REF: 3.56
IVC PRE: 2.26 L (ref 2.64–4.48)
IVC SINGLE BREATH LLN: 2.64
IVC SINGLE BREATH PRE REF: 63.6 %
IVC SINGLE BREATH REF: 3.56
MEP LLN: 83
MEP PRE REF: 98.7 %
MEP PRE: 98.68 CMH2O (ref 83.23–116.78)
MEP REF: 100
MIP LLN: 58
MIP PRE REF: 115 %
MIP PRE: 86.23 CMH2O (ref 58.23–91.78)
MIP REF: 75
MVV LLN: 89
MVV PRE REF: 78.5 %
MVV REF: 104
PEF CHG: -17.4 %
PEF LLN: 5.37
PEF POST REF: 76 %
PEF PRE REF: 92 %
PEF REF: 7.37
POST FEF 25 75: 3.08 L/S (ref 1.23–4.65)
POST FET 100: 6.61 SEC
POST FEV1 FVC: 86.82 % (ref 63.35–88.87)
POST FEV1: 2.21 L (ref 1.97–3.44)
POST FVC: 2.54 L (ref 2.64–4.48)
POST PEF: 5.6 L/S (ref 5.37–9.37)
PRE DLCO: 19.6 ML/(MIN*MMHG) (ref 16.24–30.09)
PRE FEF 25 75: 3.46 L/S (ref 1.23–4.65)
PRE FET 100: 5.74 SEC
PRE FEV1 FVC: 87.8 % (ref 63.35–88.87)
PRE FEV1: 2.26 L (ref 1.97–3.44)
PRE FRC N2: 1.27 L (ref 2.41–4.38)
PRE FVC: 2.57 L (ref 2.64–4.48)
PRE MVV: 81.9 L/MIN (ref 88.68–119.98)
PRE PEF: 6.78 L/S (ref 5.37–9.37)
RVN2 LLN: 1.78
RVN2 PRE REF: 47 %
RVN2 PRE: 1.15 L (ref 1.78–3.13)
RVN2 REF: 2.45
RVN2TLCN2 LLN: 31.89
RVN2TLCN2 PRE REF: 73.1 %
RVN2TLCN2 PRE: 29.89 % (ref 31.89–49.85)
RVN2TLCN2 REF: 40.87
TLCN2 LLN: 4.96
TLCN2 PRE REF: 63 %
TLCN2 PRE: 3.85 L (ref 4.96–7.26)
TLCN2 REF: 6.11
VA PRE: 3.88 L (ref 5.96–5.96)
VA SINGLE BREATH LLN: 5.96
VA SINGLE BREATH PRE REF: 65.2 %
VA SINGLE BREATH REF: 5.96
VCMAXN2 LLN: 2.64
VCMAXN2 PRE REF: 75.9 %
VCMAXN2 PRE: 2.7 L (ref 2.64–4.48)
VCMAXN2 REF: 3.56

## 2025-05-07 PROCEDURE — 94618 PULMONARY STRESS TESTING: CPT

## 2025-05-07 PROCEDURE — 94729 DIFFUSING CAPACITY: CPT

## 2025-05-07 PROCEDURE — 71250 CT THORAX DX C-: CPT | Mod: TC

## 2025-05-07 PROCEDURE — 94727 GAS DIL/WSHOT DETER LNG VOL: CPT

## 2025-05-07 PROCEDURE — 94060 EVALUATION OF WHEEZING: CPT

## 2025-05-07 PROCEDURE — 99900035 HC TECH TIME PER 15 MIN (STAT)

## 2025-05-07 PROCEDURE — 99900031 HC PATIENT EDUCATION (STAT)

## 2025-05-07 PROCEDURE — 27100098 HC SPACER

## 2025-05-07 PROCEDURE — 71250 CT THORAX DX C-: CPT | Mod: 26,,, | Performed by: RADIOLOGY

## 2025-05-07 NOTE — CARE UPDATE
"   05/07/25 1140   6MW Test   Ordering Provider Dr. Wiley   Performing nurse/tech/RT Alayna RRT   Height 5' 5" (1.651 m)   Weight 98.4 kg (216 lb 14.9 oz)   BMI (Calculated) 36.1   Predicted Distance Meters (Calculated) 421.24 meters   6MWT Status completed without stopping   Patient Reported No complaints   Was O2 used? No   6MW Distance walked (feet) 1200 feet   Distance walked (meters) 365.76 meters   Did patient stop? No   Type of assistive device(s) used? no assistive devices   LVAD patient? No   Is extra documentation required for this patient? Yes   Pre-Exercise   Oxygen Saturation 99 %   Supplemental Oxygen Room Air   Heart Rate 72 bpm   Blood Pressure (!) 145/92   Brie Dyspnea Rating  nothing at all   Exercise 1 Minute   Oxygen Saturation 98 %   Supplemental Oxygen Room Air   Heart Rate 90 bpm   Exercise 2 Minutes   Oxygen Saturation 97 %   Supplemental Oxygen Room Air   Heart Rate 94 bpm   Exercise 3 Minutes   Oxygen Saturation 98 %   Supplemental Oxygen Room Air   Heart Rate 94 bpm   Exercise 4 Minutes   Oxygen Saturation 98 %   Supplemental Oxygen Room Air   Heart Rate 96 bpm   Exercise 5 Minutes   Oxygen Saturation 98 %   Supplemental Oxygen Room Air   Heart Rate 96 bpm   Post Exercise   Oxygen Saturation 98 %   Supplemental Oxygen Room Air   Heart Rate 93 bpm   Blood Pressure (!) 163/96   Brie Dyspnea Rating  nothing at all   Recovery 1 Minute   Oxygen Saturation 99 %   Supplemental Oxygen Room Air   Heart Rate 79 bpm   Recovery 2 Minutes   Oxygen Saturation 99 %   Supplemental Oxygen Room Air   Heart Rate 75 bpm   Recovery 3 Minutes   Oxygen Saturation 99 %   Supplemental Oxygen Room Air   Heart Rate 67 bpm   Recovery   Recovery Time (seconds) 180 seconds   Oxygen Saturation 99 %   Supplemental Oxygen Room Air   Heart Rate 72 bpm   Blood Pressure 135/84   Brie Dyspnea Rating  nothing at all   Interpretation   Is procedure ready for interpretation? Yes   Oxygen Qualification   Oxygen " Qualification? No

## 2025-06-03 DIAGNOSIS — M25.562 ACUTE PAIN OF LEFT KNEE: ICD-10-CM

## 2025-06-03 DIAGNOSIS — M17.12 PRIMARY OSTEOARTHRITIS OF LEFT KNEE: ICD-10-CM

## 2025-06-03 RX ORDER — CELECOXIB 200 MG/1
200 CAPSULE ORAL DAILY PRN
Qty: 30 CAPSULE | Refills: 0 | Status: SHIPPED | OUTPATIENT
Start: 2025-06-03

## 2025-06-06 ENCOUNTER — OFFICE VISIT (OUTPATIENT)
Facility: CLINIC | Age: 70
End: 2025-06-06
Payer: COMMERCIAL

## 2025-06-06 VITALS
OXYGEN SATURATION: 95 % | WEIGHT: 212.5 LBS | BODY MASS INDEX: 35.4 KG/M2 | HEART RATE: 95 BPM | HEIGHT: 65 IN | RESPIRATION RATE: 18 BRPM | DIASTOLIC BLOOD PRESSURE: 78 MMHG | SYSTOLIC BLOOD PRESSURE: 116 MMHG

## 2025-06-06 DIAGNOSIS — R93.89 ABNORMAL CT SCAN, CHEST: Primary | ICD-10-CM

## 2025-06-06 DIAGNOSIS — R05.3 CHRONIC COUGH: ICD-10-CM

## 2025-06-06 DIAGNOSIS — K21.9 GASTROESOPHAGEAL REFLUX DISEASE, UNSPECIFIED WHETHER ESOPHAGITIS PRESENT: ICD-10-CM

## 2025-06-06 DIAGNOSIS — J39.2 DRY THROAT: ICD-10-CM

## 2025-06-06 PROCEDURE — 99999 PR PBB SHADOW E&M-EST. PATIENT-LVL III: CPT | Mod: PBBFAC,,, | Performed by: INTERNAL MEDICINE

## 2025-06-06 RX ORDER — BENZONATATE 100 MG/1
100 CAPSULE ORAL 3 TIMES DAILY PRN
Qty: 90 CAPSULE | Refills: 1 | Status: SHIPPED | OUTPATIENT
Start: 2025-06-06 | End: 2025-06-16

## 2025-06-09 ENCOUNTER — TELEPHONE (OUTPATIENT)
Dept: TRANSPLANT | Facility: CLINIC | Age: 70
End: 2025-06-09
Payer: COMMERCIAL

## 2025-06-11 ENCOUNTER — TELEPHONE (OUTPATIENT)
Dept: TRANSPLANT | Facility: CLINIC | Age: 70
End: 2025-06-11
Payer: COMMERCIAL

## 2025-06-12 NOTE — TELEPHONE ENCOUNTER
"Attempted to reach patient to discuss scheduling. No answer, left voicemail requesting return call. Provided phone number. My [employer] message sent.  ----- Message from Abbey Lewis DO sent at 6/11/2025  4:54 PM CDT -----  Regarding: RE: ALD referral  Appropriate for ALD.  Needs 6MWT and TTE.  Thanks  ----- Message -----  From: Janina Rhodes  Sent: 6/11/2025   2:58 PM CDT  To: Abbey Lewis DO  Subject: ALD referral                                     Advanced Lung Disease (ALD) Referral Note Referral from: Dr. Gene Maxwell diagnosis: abnormal HRCT "NSIP suspected" and "COPD" Age: 70 y.o. maleHeight/Weight/BMI:  5' 5" / 96.4 kg  / 35.7 kg/m² Smoking history: Social History   Tobacco Use     Smoking status: never     Smokeless tobacco: never Other Drug use: deniesPFT date: 5/7/25 FVC 2.57 (72.4%)  FEV1 2.26 (82.8%)  DLCO 19.6 (84.6%) TLC 3.84 (63%) 6 MWT date: completed? Report not found Does not? have portable Oxygen. Sat reported at 95% or ? Room air?  CXR date: 6/5/23 FINDINGS:Few coarse interstitial opacities in the lungs bilaterally which may represent scarring and atelectasis.  There is no lung consolidation.  There is no pleural effusion or pneumothorax.  Heart size within normal limits.  Degenerative change in the visualized spine.  Consideration for further evaluation with CT in light of history. Impression:See above Chest HRCT date: 5/7/25 FINDINGS:Findings:There is mosaic attenuation in the lung bases on expiratory phase.  Throughout both lungs there are subpleural areas of reticulation without honeycombing or bronchiectasis, most pronounced in the lung bases.  No significant cystic change.  No filling defects central airways.  No pleural effusion or pneumothorax.  Heart size normal.  Coronary artery disease.  Ascending aorta 4.6 cm diameter partially imaged advanced degenerative change right glenohumeral joint.  Multilevel spinal degenerative change as well. Impression:1. Some air " trapping in the lung bases.2. Scattered reticulation raises the possibility for mild NSIP type fibrotic change.3. Ectasia of the ascending aorta.4. Coronary artery disease.Echo date: not found     Other pertinent medical history: Chronic coughDry throatGastroesophageal reflux disease, unspecified whether esophagitis presentRecommendations?

## 2025-06-13 ENCOUNTER — TELEPHONE (OUTPATIENT)
Dept: TRANSPLANT | Facility: CLINIC | Age: 70
End: 2025-06-13
Payer: COMMERCIAL

## 2025-06-13 DIAGNOSIS — J44.9 CHRONIC OBSTRUCTIVE PULMONARY DISEASE, UNSPECIFIED COPD TYPE: ICD-10-CM

## 2025-06-13 DIAGNOSIS — J84.9 ILD (INTERSTITIAL LUNG DISEASE): Primary | ICD-10-CM

## 2025-06-13 NOTE — TELEPHONE ENCOUNTER
"Returned call to patient, he requests appointment on a Monday. Scheduled per provider availability. He requested to keep appointments scheduled at [facility] and at [facility] per patient request.   ----  Copied from CRM #[id number]. Topic: General Inquiry - Patient Advice  >> Jun 13, 2025  2:30 PM Omi wrote:  Consult/Advisory:        Name Of Caller: Self     Contact Preference?:[phone]     What is the nature of the call?: Calling to speak w/   Janina in regards to some question he has requesting call back     Additional Notes:  "Thank you for all that you do for our patients"  "

## 2025-06-17 ENCOUNTER — HOSPITAL ENCOUNTER (OUTPATIENT)
Dept: PULMONOLOGY | Facility: HOSPITAL | Age: 70
Discharge: HOME | End: 2025-06-17
Attending: INTERNAL MEDICINE
Payer: COMMERCIAL

## 2025-06-17 VITALS — HEIGHT: 65 IN | BODY MASS INDEX: 35.32 KG/M2 | WEIGHT: 212 LBS

## 2025-06-17 DIAGNOSIS — R93.89 ABNORMAL CT SCAN, CHEST: ICD-10-CM

## 2025-06-17 PROCEDURE — 99900035 HC TECH TIME PER 15 MIN (STAT): Mod: TXP

## 2025-06-17 PROCEDURE — 94618 PULMONARY STRESS TESTING: CPT | Mod: TXP

## 2025-06-17 NOTE — CARE UPDATE
"   06/17/25 0905   6MW Test   Ordering Provider PRADIP BELCHER MD   Performing nurse/tech/RT J. CHAMPAGNE RRT   Height 5' 5" (1.651 m)   Weight 96.2 kg (212 lb)   BMI (Calculated) 35.3   Predicted Distance Meters (Calculated) 420.16 meters   6MWT Status completed without stopping   Patient Reported No complaints   Was O2 used? No   6MW Distance walked (feet) 1300 feet   Distance walked (meters) 396.24 meters   Did patient stop? No   Type of assistive device(s) used? no assistive devices   LVAD patient? No   Is extra documentation required for this patient? Yes   Pre-Exercise   Oxygen Saturation 97 %   Supplemental Oxygen Room Air   Heart Rate 80 bpm   Blood Pressure 135/86   Brie Dyspnea Rating  nothing at all   Exercise 1 Minute   Oxygen Saturation 96 %   Supplemental Oxygen Room Air   Heart Rate 93 bpm   Exercise 2 Minutes   Oxygen Saturation 95 %   Supplemental Oxygen Room Air   Heart Rate 99 bpm   Exercise 3 Minutes   Oxygen Saturation 96 %   Supplemental Oxygen Room Air   Heart Rate 97 bpm   Exercise 4 Minutes   Oxygen Saturation 97 %   Supplemental Oxygen Room Air   Heart Rate 97 bpm   Exercise 5 Minutes   Oxygen Saturation 96 %   Supplemental Oxygen Room Air   Heart Rate 97 bpm   Post Exercise   Oxygen Saturation 97 %   Supplemental Oxygen Room Air   Heart Rate 97 bpm   Blood Pressure 145/74   Brie Dyspnea Rating  nothing at all   Recovery 1 Minute   Oxygen Saturation 98 %   Supplemental Oxygen Room Air   Heart Rate 93 bpm   Recovery 2 Minutes   Oxygen Saturation 98 %   Supplemental Oxygen Room Air   Heart Rate 72 bpm   Recovery 3 Minutes   Oxygen Saturation 97 %   Supplemental Oxygen Room Air   Heart Rate 71 bpm   Recovery   Recovery Time (seconds) 180 seconds   Oxygen Saturation 98 %   Supplemental Oxygen Room Air   Heart Rate 72 bpm   Blood Pressure (!) 138/94   Brie Dyspnea Rating  nothing at all   Interpretation   Total Time Walked (Calculated) 360 seconds   Oxygen Qualification   Oxygen Qualification? No "

## 2025-06-20 ENCOUNTER — TELEPHONE (OUTPATIENT)
Dept: TRANSPLANT | Facility: CLINIC | Age: 70
End: 2025-06-20
Payer: COMMERCIAL

## 2025-06-23 ENCOUNTER — OFFICE VISIT (OUTPATIENT)
Dept: TRANSPLANT | Facility: CLINIC | Age: 70
End: 2025-06-23
Payer: COMMERCIAL

## 2025-06-23 VITALS
RESPIRATION RATE: 16 BRPM | HEIGHT: 65 IN | BODY MASS INDEX: 34.99 KG/M2 | WEIGHT: 210 LBS | SYSTOLIC BLOOD PRESSURE: 131 MMHG | HEART RATE: 69 BPM | DIASTOLIC BLOOD PRESSURE: 83 MMHG | TEMPERATURE: 98 F | OXYGEN SATURATION: 97 %

## 2025-06-23 DIAGNOSIS — R05.3 CHRONIC COUGH: ICD-10-CM

## 2025-06-23 DIAGNOSIS — R93.89 ABNORMAL CT SCAN, CHEST: ICD-10-CM

## 2025-06-23 DIAGNOSIS — K21.9 GASTROESOPHAGEAL REFLUX DISEASE, UNSPECIFIED WHETHER ESOPHAGITIS PRESENT: ICD-10-CM

## 2025-06-23 DIAGNOSIS — E66.811 OBESITY (BMI 30.0-34.9): ICD-10-CM

## 2025-06-23 DIAGNOSIS — R05.3 CHRONIC COUGH: Primary | ICD-10-CM

## 2025-06-23 DIAGNOSIS — R09.81 SINUS CONGESTION: ICD-10-CM

## 2025-06-23 PROCEDURE — 1160F RVW MEDS BY RX/DR IN RCRD: CPT | Mod: CPTII,S$GLB,, | Performed by: INTERNAL MEDICINE

## 2025-06-23 PROCEDURE — 99999 PR PBB SHADOW E&M-EST. PATIENT-LVL IV: CPT | Mod: PBBFAC,TXP,, | Performed by: INTERNAL MEDICINE

## 2025-06-23 PROCEDURE — 1159F MED LIST DOCD IN RCRD: CPT | Mod: CPTII,S$GLB,, | Performed by: INTERNAL MEDICINE

## 2025-06-23 PROCEDURE — 3288F FALL RISK ASSESSMENT DOCD: CPT | Mod: CPTII,S$GLB,, | Performed by: INTERNAL MEDICINE

## 2025-06-23 PROCEDURE — 1125F AMNT PAIN NOTED PAIN PRSNT: CPT | Mod: CPTII,S$GLB,, | Performed by: INTERNAL MEDICINE

## 2025-06-23 PROCEDURE — 4010F ACE/ARB THERAPY RXD/TAKEN: CPT | Mod: CPTII,S$GLB,, | Performed by: INTERNAL MEDICINE

## 2025-06-23 PROCEDURE — 3075F SYST BP GE 130 - 139MM HG: CPT | Mod: CPTII,S$GLB,, | Performed by: INTERNAL MEDICINE

## 2025-06-23 PROCEDURE — 3079F DIAST BP 80-89 MM HG: CPT | Mod: CPTII,S$GLB,, | Performed by: INTERNAL MEDICINE

## 2025-06-23 PROCEDURE — 99204 OFFICE O/P NEW MOD 45 MIN: CPT | Mod: 25,S$GLB,, | Performed by: INTERNAL MEDICINE

## 2025-06-23 PROCEDURE — 3008F BODY MASS INDEX DOCD: CPT | Mod: CPTII,S$GLB,, | Performed by: INTERNAL MEDICINE

## 2025-06-23 PROCEDURE — 1100F PTFALLS ASSESS-DOCD GE2>/YR: CPT | Mod: CPTII,S$GLB,, | Performed by: INTERNAL MEDICINE

## 2025-06-23 RX ORDER — BENZONATATE 100 MG/1
100 CAPSULE ORAL 3 TIMES DAILY PRN
COMMUNITY

## 2025-06-23 RX ORDER — PANTOPRAZOLE SODIUM 40 MG/1
40 TABLET, DELAYED RELEASE ORAL
Qty: 90 TABLET | Refills: 3 | Status: SHIPPED | OUTPATIENT
Start: 2025-06-23

## 2025-06-23 NOTE — LETTER
June 23, 2025        Gene Wiley  [address] [address]  Phone: [phone]  Fax: [phone]             [facility] 1st Fl  [address]  [facility] [address]  Phone: [phone]   Patient: Sal Rowley   MR Number: [id number]   YOB: 1955   Date of Visit: 6/23/2025       Dear Dr. Gene Wiley    Thank you for referring Sal Rowley to me for evaluation. Attached you will find relevant portions of my assessment and plan of care.    If you have questions, please do not hesitate to call me. I look forward to following Sal Rowley along with you.    Sincerely,    Abbey Lewis, DO    Enclosure    If you would like to receive this communication electronically, please contact [email or web address] or [phone] to request [employer] access.    [employer] is a tool which provides read-only access to select patient information with whom you have a relationship. Its easy to use and provides real time access to review your patients record including encounter summaries, notes, results, and demographic information.    If you feel you have received this communication in error or would no longer like to receive these types of communications, please e-mail [email or web address]

## 2025-06-23 NOTE — TELEPHONE ENCOUNTER
Refill Decision Note   Sal Rowley  is requesting a refill authorization.  Brief Assessment and Rationale for Refill:  Approve     Medication Therapy Plan:         Comments:     Note composed:11:54 AM 06/23/2025

## 2025-06-23 NOTE — TELEPHONE ENCOUNTER
No care due was identified.  [facility] Embedded Care Due Messages. Reference number: [id number].   6/23/2025 7:06:09 AM CDT

## 2025-06-23 NOTE — PROGRESS NOTES
ADVANCED LUNG DISEASE INITIAL EVALUATION                                                                                                                                           Reason for Visit:  Chronic cough, abnormal chest CT    Referring Physician: Gene Wiley MD    History of Present Illness: Sal Rowley is a 70 y.o. male who is on 0L of oxygen.  He is on no assisted ventilation.  His New [place] Heart Association Class is I and a Karnofsky score of 90% - Able to carry on normal activity: minor symptoms of disease. He is not diabetic.  Requires Supplemental O2: No    Patient with history of chronic cough that started 4-5 years ago, initially described as throat clearing. The cough occurs regularly, mostly in the morning, and is more like a throat clearing rather than a deep cough. It produces mostly white phlegm, described as pea-sized. He is a patient of Dr. Wiley and was initially seen on April 25th, 2025 for this issue. Currently taking pantoprazole which has significantly reduced his symptoms. Previously took Trelegy with no change in his symptoms, so he discontinued it.  Also, took Fluticasone, Zyrtec or Claritin, and Benadryl which did not help. Reports a long ago smoker, but has not smoked in over 30 years.  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  Currently taking Tessalon Perles with some small relief.  Patient reports that he eats one large meal at 8pm every night and then goes to bed.  Denies shortness of breath, recent hospitalizations, illness, or ED visits.      Past Medical History:   Diagnosis Date    Hypertension     Mixed hyperlipidemia     Osteoarthritis        Past Surgical History:   Procedure Laterality Date    CARPAL TUNNEL RELEASE Left 3/27/2023    Procedure: RELEASE, CARPAL TUNNEL;  Surgeon: Brooks Vivas MD;  Location: [facility];  Service: Orthopedics;  Laterality: Left;    CARPAL TUNNEL RELEASE Right 4/10/2023    Procedure: RELEASE,  CARPAL TUNNEL;  Surgeon: Brooks Vivas MD;  Location: [facility];  Service: Orthopedics;  Laterality: Right;    COLONOSCOPY N/A 7/25/2023    Procedure: COLONOSCOPY;  Surgeon: Justice Mccarty MD;  Location: [facility];  Service: Endoscopy;  Laterality: N/A;  Patient needing miralax prep and EKG.    EPIDURAL STEROID INJECTION INTO LUMBAR SPINE N/A 4/12/2024    Procedure: LUMBAR EPIDURAL STEROID INJECTION (L4-5 INTERLAMINAR);  Surgeon: Josef Mccauley MD;  [facility];  Service: Pain Management;  Laterality: N/A;    KNEE ARTHROSCOPY      x 3    ROTATOR CUFF REPAIR Right     TONSILLECTOMY      TRANSFORAMINAL EPIDURAL INJECTION OF STEROID Right 8/23/2024    Procedure: TRANSFORAMINAL EPIDURAL STEROID INJECTION (L4/5,L5/S1);  Surgeon: Josef Mccauley MD;  [facility];  Service: Pain Management;  Laterality: Right;    TRANSFORAMINAL EPIDURAL INJECTION OF STEROID Right 4/4/2025    Procedure: TRANSFORAMINAL EPIDURAL STEROID INJECTION (L2/3,L3/4);  Surgeon: Josef Mccauley MD;  [facility];  Service: Pain Management;  Laterality: Right;       Allergies: Patient has no known allergies.    Current Outpatient Medications   Medication Sig    atorvastatin (LIPITOR) 20 MG tablet Take 1 tablet (20 mg total) by mouth every evening.    benzonatate (TESSALON) 100 MG capsule Take 100 mg by mouth 3 (three) times daily as needed for Cough.    celecoxib (CELEBREX) 200 MG capsule TAKE 1 CAPSULE BY MOUTH ONCE DAILY AS NEEDED    cholecalciferol, vitamin D3, (VITAMIN D3 ORAL) Take 5,000 each by mouth once a week.    niacin 500 MG CpSR 500 mg as needed.    olmesartan (BENICAR) 40 MG tablet Take 1 tablet by mouth once daily    pantoprazole (PROTONIX) 40 MG tablet Take 1 tablet by mouth once daily    sildenafiL (VIAGRA) 25 MG tablet Take 1 tablet (25 mg total) by mouth daily as needed for Erectile Dysfunction.     No current facility-administered medications for this visit.       Immunization History   Administered Date(s)  "Administered    COVID-19 Vaccine 04/23/2021, 05/21/2021    COVID-19, MRNA, LN-S, PF (MODERNA FULL 0.5 ML DOSE) 04/23/2021, 05/21/2021    Influenza - Quadrivalent - PF *Preferred* (6 months and older) 12/10/2015, 11/03/2016, 01/31/2018, 10/21/2020    Influenza - Trivalent - Fluarix, Flulaval, Fluzone, Afluria - PF 12/10/2015    Tdap 11/11/2016    Zoster 11/11/2016     Family History:  No family history on file.  Social History     Substance and Sexual Activity   Alcohol Use Yes    Comment: 3 beers/day      Social History     Substance and Sexual Activity   Drug Use Never    Social History[1]  Review of Systems   Respiratory:  Positive for cough and sputum production. Negative for hemoptysis, shortness of breath and wheezing.    Cardiovascular:  Negative for chest pain, palpitations, orthopnea, claudication, leg swelling and PND.   Musculoskeletal:  Positive for back pain (chronic).     Vitals  /83 (BP Location: Right arm, Patient Position: Sitting)   Pulse 69   Temp 98.2 °F (36.8 °C) (Oral)   Resp 16   Ht 5' 5" (1.651 m)   Wt 95.3 kg (210 lb)   SpO2 97%   BMI 34.95 kg/m²   Physical Exam  Constitutional:       Appearance: Normal appearance. He is obese.   HENT:      Head: Normocephalic and atraumatic.   Cardiovascular:      Rate and Rhythm: Normal rate and regular rhythm.   Pulmonary:      Effort: No respiratory distress.      Breath sounds: No stridor. No wheezing, rhonchi or rales.   Chest:      Chest wall: No tenderness.   Abdominal:      Tenderness: There is no abdominal tenderness.   Musculoskeletal:         General: Normal range of motion.   Skin:     General: Skin is warm and dry.   Neurological:      Mental Status: He is alert and oriented to person, place, and time.         Labs:  Hospital Outpatient Visit on 06/17/2025   Component Date Value    BSA 06/17/2025 2.1     Echavarria's Biplane MOD Ej* 06/17/2025 55     A2C EF 06/17/2025 67     A4C EF 06/17/2025 49     LVOT stroke volume 06/17/2025 58.7  "    LVIDd 06/17/2025 4.2     LV Systolic Volume 06/17/2025 35     LV Systolic Volume Index 06/17/2025 17.2     LVIDs 06/17/2025 3.0     LV ESV A2C 06/17/2025 53.26     LV Diastolic Volume 06/17/2025 78     LV ESV A4C 06/17/2025 69.05     LV Diastolic Volume Index 06/17/2025 38.42     LV EDV A2C 06/17/2025 [id number]     LV EDV A4C 06/17/2025 104.12     Left Ventricular End Sys* 06/17/2025 35.00     Left Ventricular End Sally* 06/17/2025 78.05     IVS 06/17/2025 0.9     LVOT diameter 06/17/2025 2.0     LVOT area 06/17/2025 3.1     FS 06/17/2025 28.6     Left Ventricle Relative * 06/17/2025 0.38     PW 06/17/2025 0.8     LV mass 06/17/2025 109.8     LV Mass Index 06/17/2025 54.1     MV Peak E Kapil 06/17/2025 0.57     TDI LATERAL 06/17/2025 0.13     TDI SEPTAL 06/17/2025 0.11     E/E' ratio 06/17/2025 5     MV Peak A Kapil 06/17/2025 0.55     TR Max Kapil 06/17/2025 2.4     E/A ratio 06/17/2025 1.04     IVRT 06/17/2025 124     E wave deceleration time 06/17/2025 157     LV SEPTAL E/E' RATIO 06/17/2025 5.2     LV LATERAL E/E' RATIO 06/17/2025 4.4     LVOT peak kapil 06/17/2025 0.8     Left Ventricular Outflow* 06/17/2025 0.53     Left Ventricular Outflow* 06/17/2025 1.30     RV- mantilla basal diam 06/17/2025 3.7     RV-mantilla mid d 06/17/2025 3.1     RV mid diameter 06/17/2025 3.12     RV S' 06/17/2025 12.12     TAPSE 06/17/2025 2.1     RV/LV Ratio 06/17/2025 0.88     LA Vol (MOD) 06/17/2025 62     MARY (MOD) 06/17/2025 31     RA area length vol 06/17/2025 39.61     RA Area 06/17/2025 16.8     RA vol index 06/17/2025 19.51     RA Vol 06/17/2025 40.55     AV mean gradient 06/17/2025 2     AV peak gradient 06/17/2025 4     Ao peak kapil 06/17/2025 1.0     Ao VTI 06/17/2025 23.5     LVOT peak VTI 06/17/2025 18.7     AV valve area 06/17/2025 2.5     AV Velocity Ratio 06/17/2025 0.80     AV index (prosthetic) 06/17/2025 0.80     AYAH by Velocity Ratio 06/17/2025 2.5     MV stenosis pressure 1/2* 06/17/2025 45.54     MV valve area p 1/2  meth* 06/17/2025 4.83     Triscuspid Valve Regurgi* 06/17/2025 22     Sinus 06/17/2025 3.49     ASI 06/17/2025 1.7     STJ 06/17/2025 3.0     Ascending aorta 06/17/2025 4.4     ASI 06/17/2025 2.2     Mean e' 06/17/2025 0.12     ZLVIDS 06/17/2025 -1.63     ZLVIDD 06/17/2025 -3.59     LA area A4C 06/17/2025 23.47     LA area A2C 06/17/2025 20.90     TV resting pulmonary art* 06/17/2025 26     RV TB RVSP 06/17/2025 5     Est. RA pres 06/17/2025 3            5/7/2025    12:55 PM   Pulmonary Function Tests   FVC 2.57 liters   FEV1 2.26 liters   TLC (liters) 3.85 liters   DLCO (ml/mmHg sec) 19.6 ml/mmHg sec   FVC% 72.4   FEV1% 82.8   FEF 25-75 3.46   FEF 25-75% 117.8   TLC% 63   RV 1.15   RV% 47   DLCO% 84.6         6/17/2025     9:05 AM 5/7/2025    11:40 AM   6MW   6MWT Status completed without stopping completed without stopping   Patient Reported No complaints No complaints   Was O2 used? No No   6MW Distance walked (feet) 1300 feet 1200 feet   Distance walked (meters) 396.24 meters 365.76 meters   Did patient stop? No No   Oxygen Saturation 97 % 99 %   Supplemental Oxygen Room Air Room Air   Heart Rate 80 bpm 72 bpm   Blood Pressure 135/86 145/92   Brie Dyspnea Rating  nothing at all nothing at all   Oxygen Saturation 97 % 98 %   Supplemental Oxygen Room Air Room Air   Heart Rate 97 bpm 93 bpm   Blood Pressure 145/74 163/96   Brie Dyspnea Rating  nothing at all nothing at all   Recovery Time (seconds) 180 seconds 180 seconds   Oxygen Saturation 98 % 99 %   Supplemental Oxygen Room Air Room Air   Heart Rate 72 bpm 72 bpm       Imaging:  Results for orders placed in visit on 06/05/23    X-Ray Chest PA And Lateral    Narrative  EXAMINATION:  XR CHEST PA AND LATERAL    CLINICAL HISTORY:  Chronic cough    TECHNIQUE:  PA and lateral views of the chest were performed.    COMPARISON:  None    FINDINGS:  Few coarse interstitial opacities in the lungs bilaterally which may represent scarring and atelectasis.  There is no lung  consolidation.  There is no pleural effusion or pneumothorax.  Heart size within normal limits.  Degenerative change in the visualized spine.  Consideration for further evaluation with CT in light of history.    Impression  See above      Electronically signed by: Keyur Travis DO  Date:    06/05/2023  Time:    10:21    Results for orders placed or performed during the hospital encounter of 05/07/25 (from the past 2160 hours)   CT Chest High Resolution Without Contrast    Narrative    EXAMINATION:  CT CHEST HIGH RESOLUTION WITHOUT CONTRAST    CLINICAL HISTORY:  Chronic cough    TECHNIQUE:  Low dose axial images, sagittal and coronal reformations were obtained from the thoracic inlet to the lung bases. Contrast was not administered.    COMPARISON:  04/07/2025    FINDINGS:  There is mosaic attenuation in the lung bases on expiratory phase.  Throughout both lungs there are subpleural areas of reticulation without honeycombing or bronchiectasis, most pronounced in the lung bases.  No significant cystic change.  No filling defects central airways.  No pleural effusion or pneumothorax.  Heart size normal.  Coronary artery disease.  Ascending aorta 4.6 cm diameter partially imaged advanced degenerative change right glenohumeral joint.  Multilevel spinal degenerative change as well.      Impression    1. Some air trapping in the lung bases.  2. Scattered reticulation raises the possibility for mild NSIP type fibrotic change.  3. Ectasia of the ascending aorta.  4. Coronary artery disease.      Electronically signed by: Wilberto Mosher  Date:    05/07/2025  Time:    13:25   Results for orders placed or performed during the hospital encounter of 04/07/25 (from the past 2160 hours)   CT Chest Without Contrast    Narrative    EXAMINATION:  CT CHEST WITHOUT CONTRAST    CLINICAL HISTORY:  Cough, persistent; Chronic cough    TECHNIQUE:  Low dose axial images, sagittal and coronal reformations were obtained from the thoracic inlet  to the lung bases. Contrast was not administered.    COMPARISON:  06/05/2023    FINDINGS:  The thyroid appears normal.  The main central airways are patent.  The esophagus appears normal.  The heart is normal in size.  Calcified coronary artery disease.  Calcified atheromatous disease of the aorta.  No pericardial effusion.  No mediastinal, hilar or axillary adenopathy is seen.    There is a mosaic perfusion pattern of the lungs which can be seen the setting of small airways versus small vessels disease.  There is primarily peripheral subpleural reticulation throughout both lungs suggesting component of fibrosis.  No honeycombing is seen.  No large airspace consolidations.  No pleural effusions.  Full evaluation of the lungs is limited secondary to respiratory motion artifact.    Limited evaluation of the upper abdominal structures show fatty infiltration of the liver.    Age-appropriate degenerative changes affect the skeleton.      Impression    Evaluation of the lungs is somewhat limited secondary to respiratory motion artifact.  There is a mosaic perfusion pattern of the lungs which can be seen the setting of small airways versus small vessels disease.  Additionally, there is some subpleural reticulation bilaterally suggesting component of fibrosis.  No honeycombing is seen.  No consolidation or pleural effusions.    Fatty infiltration of the liver.      Electronically signed by: Morena Rowe MD  Date:    04/07/2025  Time:    09:53         Results for orders placed during the hospital encounter of 02/23/16    CT Maxillofacial Without Contrast    Narrative  Maxillofacial bone CT    Technique: Helical CT scan of the facial bones from the level of the frontal sinuses to the mandible  performed without intravenous contrast.  2 mm contiguous axial sections with coronal and sagittal reformations provided for interpretation.    Comparison: None.    Findings:    Orbital bony walls, nasal bones, zygomatic arches,  pterygoid plates, maxilla and mandible are intact.  No facial bone fracture.  Temporomandibular joints are aligned.    Minimal membrane thickening noted around the ethmoid sinus.  No air-fluid levels to suggest acute sinusitis.  Mastoid air cells are aerated.    Optic globes, optic nerves, and extraocular muscles are unremarkable.  No indication of intra-or extraconal retrobulbar or soft tissue mass on either side.    Salivary glands are unremarkable. Severe dental artifact limits evaluation of the maxilla.  IMPRESSION:      1.  No facial bone fracture.  ______________________________________    Electronically signed by resident: DAMIAN HORTA MD  Date:     02/23/16  Time:    12:08          As the supervising and teaching physician, I personally reviewed the images and resident's interpretation and I agree with the findings.        Electronically signed by: JANET CHIN MD  Date:     02/23/16  Time:    12:08      Cardiodiagnostics:  Results for orders placed during the hospital encounter of 06/17/25    Echo    Interpretation Summary    Left Ventricle: The left ventricle is normal in size. Normal wall thickness. Normal wall motion. There is normal systolic function with a visually estimated ejection fraction of 55 - 60%. Quantitated ejection fraction is 55%. Grade I diastolic dysfunction.    Right Ventricle: The right ventricle is normal in size measuring 3.7 cm. Systolic function is normal.    Aortic Valve: There is aortic valve sclerosis. There is normal leaflet mobility. There is no stenosis. Aortic valve peak velocity is 1.0 m/s. Mean gradient is 2 mmHg. There is no significant regurgitation.    Tricuspid Valve: There is moderate regurgitation.    Aorta: The aortic root is normal in size measuring 3.49 cm. The ascending aorta is mildly dilated measuring 4.4 cm.    Pulmonary Artery: The estimated pulmonary artery systolic pressure is 26 mmHg.    IVC/SVC: Normal venous pressure at 3 mmHg.        Assessment:  1.  Chronic cough    2. Abnormal CT scan, chest    3. Gastroesophageal reflux disease, unspecified whether esophagitis present    4. Obesity (BMI 30.0-34.9)    5. Sinus congestion      Plan: Patient with chronic cough, with improvement with PPI.  Chest CT with some air trapping in the bases and scattered reticulations. PFTs with some restriction.  No hypoxia on 6MWT.  Denies shortness of breath.  No PH on echo.      Patient with chronic sinus congestion.  Recommend allergy regimen, including Singulair.  Can consider CT of sinuses and ENT consult.     Recommend autoimmune labs (already ordered by Dr. Wiley)    Patient instructed to change eating habits as he currently eats one large meal before bedtime.  Continue pantoprazole.    Can trend PFTs and check yearly chest CT.  If worsens, can consider OFEV.    May consider lung biopsy for definitive diagnosis.      Patient wishes to continue current care with Dr. Gene Wiley.  Can return to this clinic prn    Sergey Wiley NP  Lung Transplant/Advanced Lung Disease         [1]   Social History  Socioeconomic History    Marital status:      Spouse name: Annika Rowley    Number of children: 1   Tobacco Use    Smoking status: Former     Current packs/day: 0.00     Types: Cigarettes     Quit date:      Years since quittin.4     Passive exposure: Past   Vaping Use    Vaping status: Never Used   Substance and Sexual Activity    Alcohol use: Yes     Comment: 3 beers/day    Drug use: Never    Sexual activity: Yes     Partners: Female     Social Drivers of Health     Financial Resource Strain: Low Risk  (2025)    Overall Financial Resource Strain (CARDIA)     Difficulty of Paying Living Expenses: Not hard at all   Food Insecurity: No Food Insecurity (2025)    Hunger Vital Sign     Worried About Running Out of Food in the Last Year: Never true     Ran Out of Food in the Last Year: Never true   Transportation Needs: No Transportation Needs (2025)    PRAPARE -  Transportation     Lack of Transportation (Medical): No     Lack of Transportation (Non-Medical): No   Physical Activity: Inactive (6/6/2025)    Exercise Vital Sign     Days of Exercise per Week: 0 days     Minutes of Exercise per Session: 0 min   Stress: No Stress Concern Present (6/6/2025)    [employer] - Occupational Stress Questionnaire     Feeling of Stress : Not at all   Housing Stability: Low Risk  (6/6/2025)    Housing Stability Vital Sign     Unable to Pay for Housing in the Last Year: No     Number of Times Moved in the Last Year: 0     Homeless in the Last Year: No

## 2025-06-27 ENCOUNTER — LAB VISIT (OUTPATIENT)
Dept: LAB | Facility: HOSPITAL | Age: 70
End: 2025-06-27
Attending: INTERNAL MEDICINE
Payer: COMMERCIAL

## 2025-06-27 DIAGNOSIS — R93.89 ABNORMAL CT SCAN, CHEST: ICD-10-CM

## 2025-06-27 LAB
CYCLIC CITRULLINATED PEPTIDE (CCP) (OHS): <0.5 U/ML
RHEUMATOID FACT SERPL-ACNC: <13 IU/ML

## 2025-06-27 PROCEDURE — 86235 NUCLEAR ANTIGEN ANTIBODY: CPT | Mod: 59

## 2025-06-27 PROCEDURE — 86431 RHEUMATOID FACTOR QUANT: CPT

## 2025-06-27 PROCEDURE — 86200 CCP ANTIBODY: CPT

## 2025-06-27 PROCEDURE — 83516 IMMUNOASSAY NONANTIBODY: CPT

## 2025-06-27 PROCEDURE — 36415 COLL VENOUS BLD VENIPUNCTURE: CPT

## 2025-06-27 PROCEDURE — 86038 ANTINUCLEAR ANTIBODIES: CPT

## 2025-06-30 LAB
ANA (OHS): NORMAL
CENTROMERE IGG SER-ACNC: <0.2 U
ENA SCL70 IGG SER IA-ACNC: <0.2 U
W RNA POLYMERASE III: <0.7 U/ML

## 2025-07-03 DIAGNOSIS — M17.12 PRIMARY OSTEOARTHRITIS OF LEFT KNEE: ICD-10-CM

## 2025-07-03 DIAGNOSIS — M25.562 ACUTE PAIN OF LEFT KNEE: ICD-10-CM

## 2025-07-03 RX ORDER — CELECOXIB 200 MG/1
200 CAPSULE ORAL DAILY PRN
Qty: 30 CAPSULE | Refills: 0 | Status: SHIPPED | OUTPATIENT
Start: 2025-07-03

## 2025-07-03 NOTE — TELEPHONE ENCOUNTER
Care Due:                  Date            Visit Type   Department     Provider  --------------------------------------------------------------------------------                                EP -                              [facility]  Last Visit: 03-      CARE ([facility])   NICHELLE Mccarty                               -                              [facility]  Next Visit: 10-      CARE ([facility])   NICHELLE Mccarty                                                            Last  Test          Frequency    Reason                     Performed    Due Date  --------------------------------------------------------------------------------    CBC.........  12 months..  celecoxib................  09- 09-    Lipid Panel.  12 months..  atorvastatin.............  09- 09-    [facility] Embedded Care Due Messages. Reference number: [id number].   7/03/2025 9:08:44 AM CDT

## 2025-07-04 DIAGNOSIS — E78.5 DYSLIPIDEMIA: ICD-10-CM

## 2025-07-04 NOTE — TELEPHONE ENCOUNTER
No care due was identified.  [facility] Embedded Care Due Messages. Reference number: [id number].   7/04/2025 7:05:52 AM CDT

## 2025-07-06 RX ORDER — ATORVASTATIN CALCIUM 20 MG/1
20 TABLET, FILM COATED ORAL NIGHTLY
Qty: 90 TABLET | Refills: 0 | Status: SHIPPED | OUTPATIENT
Start: 2025-07-06

## 2025-07-06 NOTE — TELEPHONE ENCOUNTER
Refill Decision Note   Sal Rowley  is requesting a refill authorization.  Brief Assessment and Rationale for Refill:  Approve     Medication Therapy Plan:         Comments:     Note composed:4:36 PM 07/06/2025             Appointments     Last Visit   3/31/2025 Justice Mccarty MD   Next Visit   10/20/2025 Justice Mccarty MD

## 2025-07-10 ENCOUNTER — TELEPHONE (OUTPATIENT)
Dept: PREADMISSION TESTING | Facility: HOSPITAL | Age: 70
End: 2025-07-10
Payer: COMMERCIAL

## 2025-07-10 NOTE — PRE-PROCEDURE INSTRUCTIONS
Patient scheduled with Dr. Mccauley on 7/11/25 at [facility].  Patient denies any known infection or use of antibiotics in the past 2 weeks.  Patient denies any new prescription of blood thinners not listed on medication list currently.    Patient instructed to arrive at 12:30.  Light breakfast encouraged; morning medications OK to take.  Patient should have transportation home.  Verbalizes understanding.

## 2025-07-11 ENCOUNTER — HOSPITAL ENCOUNTER (OUTPATIENT)
Facility: HOSPITAL | Age: 70
Discharge: HOME | End: 2025-07-11
Attending: ANESTHESIOLOGY | Admitting: ANESTHESIOLOGY
Payer: COMMERCIAL

## 2025-07-11 ENCOUNTER — HOSPITAL ENCOUNTER (OUTPATIENT)
Dept: RADIOLOGY | Facility: HOSPITAL | Age: 70
Discharge: HOME | End: 2025-07-11
Attending: ANESTHESIOLOGY | Admitting: ANESTHESIOLOGY
Payer: COMMERCIAL

## 2025-07-11 VITALS
HEART RATE: 66 BPM | TEMPERATURE: 98 F | RESPIRATION RATE: 12 BRPM | SYSTOLIC BLOOD PRESSURE: 125 MMHG | DIASTOLIC BLOOD PRESSURE: 82 MMHG | OXYGEN SATURATION: 99 %

## 2025-07-11 DIAGNOSIS — G89.29 CHRONIC PAIN: ICD-10-CM

## 2025-07-11 DIAGNOSIS — M54.17 LUMBOSACRAL RADICULOPATHY: ICD-10-CM

## 2025-07-11 DIAGNOSIS — F41.9 ANXIETY: ICD-10-CM

## 2025-07-11 DIAGNOSIS — G57.11 MERALGIA PARAESTHETICA, RIGHT: Primary | ICD-10-CM

## 2025-07-11 DIAGNOSIS — G57.11 MERALGIA PARAESTHETICA, RIGHT: ICD-10-CM

## 2025-07-11 PROCEDURE — 25000003 PHARM REV CODE 250: Performed by: ANESTHESIOLOGY

## 2025-07-11 PROCEDURE — 64447 NJX AA&/STRD FEMORAL NRV IMG: CPT | Mod: RT | Performed by: ANESTHESIOLOGY

## 2025-07-11 PROCEDURE — 64447 NJX AA&/STRD FEMORAL NRV IMG: CPT | Mod: RT,,, | Performed by: ANESTHESIOLOGY

## 2025-07-11 RX ORDER — ALPRAZOLAM 0.25 MG/1
0.5 TABLET, ORALLY DISINTEGRATING ORAL
Status: DISCONTINUED | OUTPATIENT
Start: 2025-07-11 | End: 2025-07-11 | Stop reason: HOSPADM

## 2025-07-11 RX ADMIN — ALPRAZOLAM 0.5 MG: 0.25 TABLET, ORALLY DISINTEGRATING ORAL at 01:07

## 2025-07-11 NOTE — DISCHARGE SUMMARY
Discharge Note  Short Stay    Admit Date: 7/11/2025    Attending Physician: Josef Mccauley    Discharge Physician: Josef Mccauley    Discharge Date: 7/11/2025 2:42 PM    Procedure(s) (LRB):  BLOCK, NERVE, FEMORAL CUTANEOUS (ORAL) (Right)    Final Diagnosis: Meralgia paraesthetica, right [G57.11]    Disposition: Home or self care    Patient Instructions:   Current Discharge Medication List        CONTINUE these medications which have NOT CHANGED    Details   atorvastatin (LIPITOR) 20 MG tablet TAKE 1 TABLET BY MOUTH ONCE DAILY IN THE EVENING  Qty: 90 tablet, Refills: 0    Associated Diagnoses: Dyslipidemia      celecoxib (CELEBREX) 200 MG capsule TAKE 1 CAPSULE BY MOUTH ONCE DAILY AS NEEDED  Qty: 30 capsule, Refills: 0    Associated Diagnoses: Acute pain of left knee; Primary osteoarthritis of left knee      cholecalciferol, vitamin D3, (VITAMIN D3 ORAL) Take 5,000 each by mouth once a week.      niacin 500 MG CpSR 500 mg as needed.  Refills: 5      olmesartan (BENICAR) 40 MG tablet Take 1 tablet by mouth once daily  Qty: 90 tablet, Refills: 3    Comments: .  Associated Diagnoses: Essential hypertension      pantoprazole (PROTONIX) 40 MG tablet Take 1 tablet by mouth once daily  Qty: 90 tablet, Refills: 3    Associated Diagnoses: Chronic cough      benzonatate (TESSALON) 100 MG capsule Take 100 mg by mouth 3 (three) times daily as needed for Cough.      sildenafiL (VIAGRA) 25 MG tablet Take 1 tablet (25 mg total) by mouth daily as needed for Erectile Dysfunction.  Qty: 20 tablet, Refills: 5    Associated Diagnoses: Erectile dysfunction, unspecified erectile dysfunction type             Discharge Diagnosis: Meralgia paraesthetica, right [G57.11]  Condition on Discharge: Stable with no complications to procedure   Diet on Discharge: Same as before.  Activity: as per instruction sheet.  Discharge to: Home with a responsible adult.  Follow up: 2-4 weeks       Please call my office or pager at [phone] if experienced  any weakness or loss of sensation, fever > 101.5, pain uncontrolled with oral medications, persistent nausea/vomiting/or diarrhea, redness or drainage from the incisions, or any other worrisome concerns. If physician on call was not reached or could not communicate with our office for any reason please go to the nearest emergency department.     Josef Mccauley  07/11/2025

## 2025-07-11 NOTE — DISCHARGE INSTRUCTIONS
DIET: You may resume your normal diet today.    BATHING: You may resume your normal bathing.          You may shower, no hot water directly on site for 24 hours.    DRESSING: You may remove your bandage today.    ACTIVITY LEVEL: You may resume your normal activities 24 hours after your  procedure.    If you have received sedation or an anesthetic, you may feel sleepy for several hours. Rest until you are more awake. Gradually resume your normal activities tomorrow.    If you have received sedation or an anesthetic, do not drive or operate heavy machinery for at least 24 hours.    MEDICATION: You may resume your normal medications today.    You will receive instructions for any pain prescriptions. Pain medications should be taken only as directed.    SPECIAL INSTRUCTIONS: No heat to the injection site for 24 hours including: bath or shower, heating pad, moist heat, hot tubs.    Use ice pack to injection site for any pain or discomfort. Apply ice pack to 20 minutes then remove for 20 minutes before re-applying to site.    WHEN TO CALL DOCTOR: Redness or swelling around injection site    Fever of 101F    Drainage (pus) from the injection site    For any continuous bleeding (some dried blood over the incision is normal).    FOLLOW UP: Follow up phone call will be made by office.    FOR EMERGENCIES: If any unusual problems or difficulties occur during clinic hours, call [phone] or [phone].

## 2025-07-11 NOTE — H&P
HPI  Patient presenting for Procedure(s) (LRB):  BLOCK, NERVE, FEMORAL CUTANEOUS (ORAL) (Right)     Patient on Anti-coagulation No    Last Blood Glucose level is less than 200 before the procedure.    Patient is not on antibiotic for the last 2 weeks    Patient has a Ride Home assistance.    No health changes since previous encounter    Past Medical History:   Diagnosis Date    Hypertension     Mixed hyperlipidemia     Osteoarthritis      Past Surgical History:   Procedure Laterality Date    CARPAL TUNNEL RELEASE Left 3/27/2023    Procedure: RELEASE, CARPAL TUNNEL;  Surgeon: Brooks Vivas MD;  Location: [facility] OR;  Service: Orthopedics;  Laterality: Left;    CARPAL TUNNEL RELEASE Right 4/10/2023    Procedure: RELEASE, CARPAL TUNNEL;  Surgeon: Brooks Vivas MD;  Location: [facility] OR;  Service: Orthopedics;  Laterality: Right;    COLONOSCOPY N/A 7/25/2023    Procedure: COLONOSCOPY;  Surgeon: Justice Mccarty MD;  Location: [facility] ENDO;  Service: Endoscopy;  Laterality: N/A;  Patient needing miralax prep and EKG.    EPIDURAL STEROID INJECTION INTO LUMBAR SPINE N/A 4/12/2024    Procedure: LUMBAR EPIDURAL STEROID INJECTION (L4-5 INTERLAMINAR);  Surgeon: Josef Mccauley MD;  Location: [facility] OR;  Service: Pain Management;  Laterality: N/A;    KNEE ARTHROSCOPY      x 3    ROTATOR CUFF REPAIR Right     TONSILLECTOMY      TRANSFORAMINAL EPIDURAL INJECTION OF STEROID Right 8/23/2024    Procedure: TRANSFORAMINAL EPIDURAL STEROID INJECTION (L4/5,L5/S1);  Surgeon: Josef Mccauley MD;  Location: [facility] OR;  Service: Pain Management;  Laterality: Right;    TRANSFORAMINAL EPIDURAL INJECTION OF STEROID Right 4/4/2025    Procedure: TRANSFORAMINAL EPIDURAL STEROID INJECTION (L2/3,L3/4);  Surgeon: Josef Mccauley MD;  Location: [facility] OR;  Service: Pain Management;  Laterality: Right;     Review of patient's allergies indicates:  No Known Allergies   Current Facility-Administered Medications   Medication    ALPRAZolam dissolvable tablet  0.5 mg       PMHx, PSHx, Allergies, Medications reviewed in epic    ROS negative except pain complaints in HPI    OBJECTIVE:    BP (!) 144/77   Pulse 98   Temp 97.6 °F (36.4 °C)   Resp 18   SpO2 98%     PHYSICAL EXAMINATION:    GENERAL: Well appearing, in no acute distress, alert and oriented x3.  PSYCH:  Mood and affect appropriate.  SKIN: Skin color, texture, turgor normal, no rashes or lesions which will impact the procedure.  CV: RRR with palpation of the radial artery.  PULM: No evidence of respiratory difficulty, symmetric chest rise. Clear to auscultation.  NEURO: Cranial nerves grossly intact.    Plan:    Proceed with procedure as planned Procedure(s) (LRB):  BLOCK, NERVE, FEMORAL CUTANEOUS (ORAL) (Right)    Josef Mccauley  Interventional Pain  07/11/2025

## 2025-07-11 NOTE — OP NOTE
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  The area over the Right lateral femoral cutaneous nerve was prepped in usual sterile fashion using chlorhexidine at the level of the ASIS and inguinal ligament.  A sterile ultrasound cover was applied, and the probe was placed over the lateral femoral cutaneous nerve, and was visualized directly.  A 25 gauge needle was then used to anesthetize the skin and deeper tissues with approximately 5 mL of 1% lidocaine. This was followed by advancement of a 4 inch stimuplex needle using in plane technique with the needle location being visualized in real time to pass into the area of the lateral femoral cutaneous nerve.  Once encountered, after negative aspiration, and no paresthesias, 5mL 0.5% Bupivacaine W 10 mg of dexamethazone  was injected into the area, with dispersion of the medication into the area of the nerve being visualized directly using the ultrasound probe, confirming that the medication was deposited in the correct area.   Needle was withdrawn and a sterile band-aid applied to the skin.     Patient tolerated the procedure well, and was reporting improvement of pain symptoms after the injection.  Patient was discharged from the clinic in stable condition.    Josef Mccauley MD  Anesthesiologist  Interventional Pain Management  07/11/2025

## 2025-07-31 DIAGNOSIS — M17.12 PRIMARY OSTEOARTHRITIS OF LEFT KNEE: ICD-10-CM

## 2025-07-31 DIAGNOSIS — M25.562 ACUTE PAIN OF LEFT KNEE: ICD-10-CM

## 2025-07-31 RX ORDER — CELECOXIB 200 MG/1
200 CAPSULE ORAL DAILY PRN
Qty: 30 CAPSULE | Refills: 0 | Status: SHIPPED | OUTPATIENT
Start: 2025-07-31

## 2025-07-31 NOTE — TELEPHONE ENCOUNTER
No care due was identified.  [facility] Embedded Care Due Messages. Reference number: [id number].   7/31/2025 9:11:33 AM CDT

## 2025-08-11 ENCOUNTER — OFFICE VISIT (OUTPATIENT)
Dept: NEUROLOGY | Facility: CLINIC | Age: 70
End: 2025-08-11
Payer: COMMERCIAL

## 2025-08-11 VITALS
OXYGEN SATURATION: 96 % | DIASTOLIC BLOOD PRESSURE: 78 MMHG | SYSTOLIC BLOOD PRESSURE: 114 MMHG | BODY MASS INDEX: 36.29 KG/M2 | WEIGHT: 217.81 LBS | HEART RATE: 72 BPM | RESPIRATION RATE: 16 BRPM | HEIGHT: 65 IN

## 2025-08-11 DIAGNOSIS — E66.01 SEVERE OBESITY (BMI 35.0-39.9) WITH COMORBIDITY: ICD-10-CM

## 2025-08-11 DIAGNOSIS — R20.0 RIGHT LEG NUMBNESS: ICD-10-CM

## 2025-08-11 DIAGNOSIS — R20.2 LEFT FACE AND LEFT ARM TINGLING: ICD-10-CM

## 2025-08-11 DIAGNOSIS — M54.17 LUMBOSACRAL RADICULOPATHY: Primary | ICD-10-CM

## 2025-08-11 PROCEDURE — 3008F BODY MASS INDEX DOCD: CPT | Mod: CPTII,S$GLB,, | Performed by: PSYCHIATRY & NEUROLOGY

## 2025-08-11 PROCEDURE — 99214 OFFICE O/P EST MOD 30 MIN: CPT | Mod: S$GLB,,, | Performed by: PSYCHIATRY & NEUROLOGY

## 2025-08-11 PROCEDURE — 1125F AMNT PAIN NOTED PAIN PRSNT: CPT | Mod: CPTII,S$GLB,, | Performed by: PSYCHIATRY & NEUROLOGY

## 2025-08-11 PROCEDURE — 4010F ACE/ARB THERAPY RXD/TAKEN: CPT | Mod: CPTII,S$GLB,, | Performed by: PSYCHIATRY & NEUROLOGY

## 2025-08-11 PROCEDURE — 3074F SYST BP LT 130 MM HG: CPT | Mod: CPTII,S$GLB,, | Performed by: PSYCHIATRY & NEUROLOGY

## 2025-08-11 PROCEDURE — 3078F DIAST BP <80 MM HG: CPT | Mod: CPTII,S$GLB,, | Performed by: PSYCHIATRY & NEUROLOGY

## 2025-08-11 PROCEDURE — 99999 PR PBB SHADOW E&M-EST. PATIENT-LVL IV: CPT | Mod: PBBFAC,,, | Performed by: PSYCHIATRY & NEUROLOGY

## 2025-08-11 PROCEDURE — 1160F RVW MEDS BY RX/DR IN RCRD: CPT | Mod: CPTII,S$GLB,, | Performed by: PSYCHIATRY & NEUROLOGY

## 2025-08-11 PROCEDURE — 1159F MED LIST DOCD IN RCRD: CPT | Mod: CPTII,S$GLB,, | Performed by: PSYCHIATRY & NEUROLOGY

## 2025-08-28 DIAGNOSIS — M25.562 ACUTE PAIN OF LEFT KNEE: ICD-10-CM

## 2025-08-28 DIAGNOSIS — M17.12 PRIMARY OSTEOARTHRITIS OF LEFT KNEE: ICD-10-CM

## 2025-08-28 RX ORDER — CELECOXIB 200 MG/1
200 CAPSULE ORAL DAILY PRN
Qty: 30 CAPSULE | Refills: 0 | Status: SHIPPED | OUTPATIENT
Start: 2025-08-28

## (undated) DEVICE — KIT NERVE BLOCK PREP BAPTIST

## (undated) DEVICE — MARKER SKIN RULER AND LABEL

## (undated) DEVICE — CHLORAPREP 10.5 ML APPLICATOR